# Patient Record
Sex: MALE | Race: WHITE | NOT HISPANIC OR LATINO | Employment: FULL TIME | URBAN - METROPOLITAN AREA
[De-identification: names, ages, dates, MRNs, and addresses within clinical notes are randomized per-mention and may not be internally consistent; named-entity substitution may affect disease eponyms.]

---

## 2017-01-05 ENCOUNTER — OFFICE VISIT (OUTPATIENT)
Dept: FAMILY MEDICINE | Facility: CLINIC | Age: 57
End: 2017-01-05
Payer: COMMERCIAL

## 2017-01-05 VITALS
HEIGHT: 71 IN | SYSTOLIC BLOOD PRESSURE: 118 MMHG | BODY MASS INDEX: 26.01 KG/M2 | TEMPERATURE: 96 F | DIASTOLIC BLOOD PRESSURE: 72 MMHG | HEART RATE: 81 BPM | WEIGHT: 185.8 LBS

## 2017-01-05 DIAGNOSIS — L30.9 ECZEMA OF BOTH HANDS: Primary | ICD-10-CM

## 2017-01-05 DIAGNOSIS — Z12.11 SPECIAL SCREENING FOR MALIGNANT NEOPLASMS, COLON: ICD-10-CM

## 2017-01-05 PROCEDURE — 99213 OFFICE O/P EST LOW 20 MIN: CPT | Performed by: FAMILY MEDICINE

## 2017-01-05 RX ORDER — BETAMETHASONE DIPROPIONATE 0.5 MG/G
OINTMENT, AUGMENTED TOPICAL
Qty: 45 G | Refills: 1 | Status: SHIPPED | OUTPATIENT
Start: 2017-01-05 | End: 2017-10-27

## 2017-01-05 NOTE — NURSING NOTE
"Chief Complaint   Patient presents with     Derm Problem     Pt here for dry itchy skin on hands.       Initial /72 mmHg  Pulse 81  Temp(Src) 96  F (35.6  C) (Tympanic)  Ht 5' 11\" (1.803 m)  Wt 185 lb 12.8 oz (84.278 kg)  BMI 25.93 kg/m2 Estimated body mass index is 25.93 kg/(m^2) as calculated from the following:    Height as of this encounter: 5' 11\" (1.803 m).    Weight as of this encounter: 185 lb 12.8 oz (84.278 kg).  BP completed using cuff size: antonella Samuel CMA    "

## 2017-01-05 NOTE — PATIENT INSTRUCTIONS
Stop triamcinolone ointment.  Start betamethasone ointment as prescribed.  Cerave cream emollient/moisturizer liberally to both hands and feet 3-4 x a day or more often.  AT night, may cover hands with non-latex glove and knitted glove to keep moisturized. May do this for a few days.    Skin care regimen reviewed with patient:   Eliminate harsh soaps, i.e. Dial, zest, [place] spring  Use mild soaps such as Cetaphil or Dove sensitive skin  Avoid hot or cold shower  [place] use of emollients including Vanicream, Cetaphil or cerave.    Nonspecific Dermatitis  Dermatitis is a skin rash caused by something that touches the skin and makes it irritated and inflamed.  Your skin may be red, swollen, dry, and may be cracked. Blisters may form and ooze. The rash will itch.  Dermatitis can form on the face and neck, backs of hands, forearms, genitals, and lower legs. Dermatitis is not passed from person to person.  Talk with your health care provider about what may have caused the rash. Common things that cause skin allergies are metal in jewelry, plants like poison ivy or poison oak, and certain skin care products. You will need to avoid the source of your rash in the future to prevent it from coming back. In some cases, the cause of the dermatitis may not be found.  Treatment is done to relieve itching and prevent the rash from coming back. The rash should go away in a few days to a few weeks.  Home care  The health care provider may prescribe medications to relieve swelling and itching. Follow all instructions when using these medications.    Avoid anything that heats up your skin, such as hot showers or baths, or direct sunlight. This can make itching worse.    Stay away from whatever you think caused the rash.    Bathe in warm, not hot, water. Apply a moisturizing lotion after bathing to prevent dry skin.    Avoid skin irritants such as wool or silk clothing, grease, oils, harsh soaps, and detergents.    Apply cold  compresses to soothe your sores to help relieve your symptoms. Do this for 30 minutes 3 to 4 times a day. You can make a cold compress by soaking a cloth in cold water. Squeeze out excess water. You can add colloidal oatmeal to the water to help reduce itching. For severe itching in a small area, apply an ice pack wrapped in a thin towel. Do this for 20 minutes 3 to 4 times a day.    You can also help relieve large areas of itching by taking a lukewarm bath with colloidal oatmeal added to the water.    Use hydrocortisone cream for redness and irritation, unless another medicine was prescribed. You can also use benzocaine anesthetic cream or spray.    Use oral diphenhydramine to help reduce itching. This is an antihistamine you can buy at drug and grocery stores. It can make you sleepy, so use lower doses during the daytime. Or you can use loratadine. This is an antihistamine that will not make you sleepy. Don t use diphenhydramine if you have glaucoma or have trouble urinating because of an enlarged prostate.    Wash your hands or use an antibacterial gel often to prevent the spread of the rash.  Follow-up care  Follow up with your health care provider. Make an appointment with your health care provider if your symptoms do not get better in the next 1 to 2 weeks.  When to seek medical advice  Call your health care provider right away if any of these occur:    Spreading of the rash to other parts of your body    Severe swelling of your face, eyelids, mouth, throat or tongue    Trouble urinating due to swelling in the genital area    Fever of 100.4 F (38 C) or higher    Redness or swelling that gets worse    Pain that gets worse    Foul-smelling fluid leaking from the skin    Yellow-brown crusts on the open blisters    Joint pain     2775-6882 [employer]. [address] [place] [address], [place] [address]. All rights reserved. This information is not intended as a substitute for professional medical care. Always  follow your healthcare professional's instructions.

## 2017-01-05 NOTE — PROGRESS NOTES
SUBJECTIVE:                                                    Kenney Mcgowan is a 56 year old male who presents to clinic today for the following health issues:  Chief Complaint   Patient presents with     Derm Problem     Pt here for dry itchy skin on hands.         Rash     Onset: 3-4 yrs     Description:   Location: hands and both legs   Character: flakey, red, raised  Itching (Pruritis): YES    Progression of Symptoms:  usually occurs when winter starts and constant throughout winter    Accompanying Signs & Symptoms:  Fever: no   Body aches or joint pain: no   Sore throat symptoms: no   Recent cold symptoms: no    History:   Previous similar rash: YES- dermatitis    Precipitating factors:   Exposure to similar rash: no   New exposures: None   Recent travel: no     Alleviating factors:  none     Therapies Tried and outcome: triamcinlone ointement helps but does not go away, coconut oil, lavendar, essential oils    Verified above history with patient.      Problem list and histories reviewed & adjusted, as indicated.  Additional history: as documented    Patient Active Problem List   Diagnosis     Enthesopathy     Nonallopathic lesion of lower extremities     Tobacco chew use     Intermittent asthma     CARDIOVASCULAR SCREENING; LDL GOAL LESS THAN 130     History reviewed. No pertinent past surgical history.    Social History   Substance Use Topics     Smoking status: Never Smoker      Smokeless tobacco: Current User     Types: Chew      Comment: daily      Alcohol Use: Yes      Comment: occassionally     Family History   Problem Relation Age of Onset     DIABETES Father      HEART DISEASE Father      DIABETES Sister      HEART DISEASE Mother          Current Outpatient Prescriptions   Medication Sig Dispense Refill     augmented betamethasone dipropionate (DIPROLENE-AF) 0.05 % ointment Apply sparingly to affected area twice daily as needed.  Do not apply to face. 45 g 1     Emollient (CERAVE) CREA Apply liberally  "to both hands 3-4 x a day. 453 g 3     budesonide-formoterol (SYMBICORT) 80-4.5 MCG/ACT inhaler 1 puff each night (Needs follow-up appointment for this medication) 1 Inhaler 0     albuterol (PROAIR HFA, PROVENTIL HFA, VENTOLIN HFA) 108 (90 BASE) MCG/ACT inhaler 2 puffs prior to exercise and as needed for shortness of breath 3 Inhaler 1     Allergies   Allergen Reactions     Cat Hair Extract        ROS:  C: NEGATIVE for fever, chills, change in weight  INTEGUMENTARY/SKIN: see above  MUSCULOSKELETAL: see above  H: NEGATIVE for bleeding problems   OBJECTIVE:                                                    /72 mmHg  Pulse 81  Temp(Src) 96  F (35.6  C) (Tympanic)  Ht 5' 11\" (1.803 m)  Wt 185 lb 12.8 oz (84.278 kg)  BMI 25.93 kg/m2  Body mass index is 25.93 kg/(m^2).  GEN: alert, oriented x 3, NAD  SKIN: good turgor; patchy dry areas of skin with some superficial excoriations from scratching on the dorsum of hands and extensor surfaces of the fingers bilaterally; patient declined examination of feet today.  Diagnostic test results:  Diagnostic Test Results:  none      ASSESSMENT/PLAN:                                                        ICD-10-CM    1. Eczema of both hands L30.9 augmented betamethasone dipropionate (DIPROLENE-AF) 0.05 % ointment     Emollient (CERAVE) CREA  Since no improvement with triamcinolone, stop the med.  Start betamethasone.  Discussed liberal use of cerave multiple times a day.  Daily skin care discussed; see AVS.  Return precautions discussed and given to patient.     2. Special screening for malignant neoplasms, colon Z12.11 GASTROENTEROLOGY ADULT REFERRAL +/- PROCEDURE               Follow up with Provider - prn   Patient Instructions   Stop triamcinolone ointment.  Start betamethasone ointment as prescribed.  Cerave cream emollient/moisturizer liberally to both hands and feet 3-4 x a day or more often.  AT night, may cover hands with non-latex glove and knitted glove to keep " moisturized. May do this for a few days.    Skin care regimen reviewed with patient:   Eliminate harsh soaps, i.e. Dial, zest, [place]hto spring  Use mild soaps such as Cetaphil or Dove sensitive skin  Avoid hot or cold shower  [place] use of emollients including Vanicream, Cetaphil or cerave.    Nonspecific Dermatitis  Dermatitis is a skin rash caused by something that touches the skin and makes it irritated and inflamed.  Your skin may be red, swollen, dry, and may be cracked. Blisters may form and ooze. The rash will itch.  Dermatitis can form on the face and neck, backs of hands, forearms, genitals, and lower legs. Dermatitis is not passed from person to person.  Talk with your health care provider about what may have caused the rash. Common things that cause skin allergies are metal in jewelry, plants like poison ivy or poison oak, and certain skin care products. You will need to avoid the source of your rash in the future to prevent it from coming back. In some cases, the cause of the dermatitis may not be found.  Treatment is done to relieve itching and prevent the rash from coming back. The rash should go away in a few days to a few weeks.  Home care  The health care provider may prescribe medications to relieve swelling and itching. Follow all instructions when using these medications.    Avoid anything that heats up your skin, such as hot showers or baths, or direct sunlight. This can make itching worse.    Stay away from whatever you think caused the rash.    Bathe in warm, not hot, water. Apply a moisturizing lotion after bathing to prevent dry skin.    Avoid skin irritants such as wool or silk clothing, grease, oils, harsh soaps, and detergents.    Apply cold compresses to soothe your sores to help relieve your symptoms. Do this for 30 minutes 3 to 4 times a day. You can make a cold compress by soaking a cloth in cold water. Squeeze out excess water. You can add colloidal oatmeal to the water to help reduce  itching. For severe itching in a small area, apply an ice pack wrapped in a thin towel. Do this for 20 minutes 3 to 4 times a day.    You can also help relieve large areas of itching by taking a lukewarm bath with colloidal oatmeal added to the water.    Use hydrocortisone cream for redness and irritation, unless another medicine was prescribed. You can also use benzocaine anesthetic cream or spray.    Use oral diphenhydramine to help reduce itching. This is an antihistamine you can buy at drug and grocery stores. It can make you sleepy, so use lower doses during the daytime. Or you can use loratadine. This is an antihistamine that will not make you sleepy. Don t use diphenhydramine if you have glaucoma or have trouble urinating because of an enlarged prostate.    Wash your hands or use an antibacterial gel often to prevent the spread of the rash.  Follow-up care  Follow up with your health care provider. Make an appointment with your health care provider if your symptoms do not get better in the next 1 to 2 weeks.  When to seek medical advice  Call your health care provider right away if any of these occur:    Spreading of the rash to other parts of your body    Severe swelling of your face, eyelids, mouth, throat or tongue    Trouble urinating due to swelling in the genital area    Fever of 100.4 F (38 C) or higher    Redness or swelling that gets worse    Pain that gets worse    Foul-smelling fluid leaking from the skin    Yellow-brown crusts on the open blisters    Joint pain     8283-9603 [employer]. [address] [place] [address], [place] [id number]. All rights reserved. This information is not intended as a substitute for professional medical care. Always follow your healthcare professional's instructions.              Malcolm Truong MD  [facility]

## 2017-01-05 NOTE — MR AVS SNAPSHOT
After Visit Summary   1/5/2017    Kenney Mcgowan    MRN: [id number]           Patient Information     Date Of Birth          1960        Visit Information        Provider Department      1/5/2017 1:40 PM Malcolm Truong MD [facility]        Today's Diagnoses     Eczema of both hands    -  1     Special screening for malignant neoplasms, colon         Intermittent asthma, uncomplicated           Care Instructions    Stop triamcinolone ointment.  Start betamethasone ointment as prescribed.  Cerave cream emollient/moisturizer liberally to both hands and feet 3-4 x a day or more often.  AT night, may cover hands with non-latex glove and knitted glove to keep moisturized. May do this for a few days.    Skin care regimen reviewed with patient:   Eliminate harsh soaps, i.e. Dial, zest, Vi spring  Use mild soaps such as Cetaphil or Dove sensitive skin  Avoid hot or cold shower  [place] use of emollients including Vanicream, Cetaphil or cerave.    Nonspecific Dermatitis  Dermatitis is a skin rash caused by something that touches the skin and makes it irritated and inflamed.  Your skin may be red, swollen, dry, and may be cracked. Blisters may form and ooze. The rash will itch.  Dermatitis can form on the face and neck, backs of hands, forearms, genitals, and lower legs. Dermatitis is not passed from person to person.  Talk with your health care provider about what may have caused the rash. Common things that cause skin allergies are metal in jewelry, plants like poison ivy or poison oak, and certain skin care products. You will need to avoid the source of your rash in the future to prevent it from coming back. In some cases, the cause of the dermatitis may not be found.  Treatment is done to relieve itching and prevent the rash from coming back. The rash should go away in a few days to a few weeks.  Home care  The health care provider may prescribe medications to relieve swelling and  itching. Follow all instructions when using these medications.    Avoid anything that heats up your skin, such as hot showers or baths, or direct sunlight. This can make itching worse.    Stay away from whatever you think caused the rash.    Bathe in warm, not hot, water. Apply a moisturizing lotion after bathing to prevent dry skin.    Avoid skin irritants such as wool or silk clothing, grease, oils, harsh soaps, and detergents.    Apply cold compresses to soothe your sores to help relieve your symptoms. Do this for 30 minutes 3 to 4 times a day. You can make a cold compress by soaking a cloth in cold water. Squeeze out excess water. You can add colloidal oatmeal to the water to help reduce itching. For severe itching in a small area, apply an ice pack wrapped in a thin towel. Do this for 20 minutes 3 to 4 times a day.    You can also help relieve large areas of itching by taking a lukewarm bath with colloidal oatmeal added to the water.    Use hydrocortisone cream for redness and irritation, unless another medicine was prescribed. You can also use benzocaine anesthetic cream or spray.    Use oral diphenhydramine to help reduce itching. This is an antihistamine you can buy at drug and grocery stores. It can make you sleepy, so use lower doses during the daytime. Or you can use loratadine. This is an antihistamine that will not make you sleepy. Don t use diphenhydramine if you have glaucoma or have trouble urinating because of an enlarged prostate.    Wash your hands or use an antibacterial gel often to prevent the spread of the rash.  Follow-up care  Follow up with your health care provider. Make an appointment with your health care provider if your symptoms do not get better in the next 1 to 2 weeks.  When to seek medical advice  Call your health care provider right away if any of these occur:    Spreading of the rash to other parts of your body    Severe swelling of your face, eyelids, mouth, throat or  tongue    Trouble urinating due to swelling in the genital area    Fever of 100.4 F (38 C) or higher    Redness or swelling that gets worse    Pain that gets worse    Foul-smelling fluid leaking from the skin    Yellow-brown crusts on the open blisters    Joint pain     9591-0003 [employer]. [address] [place] [address]. All rights reserved. This information is not intended as a substitute for professional medical care. Always follow your healthcare professional's instructions.              Follow-ups after your visit        Additional Services     GASTROENTEROLOGY ADULT REFERRAL +/- PROCEDURE       Your provider has referred you to Gastroenterology Services.    English    Procedure/Referral: PROCEDURE ONLY - COLONOSCOPY - Reason for procedure: Screening  FMG: [facility] [phone]   [email or web address]  Indications for Colonoscopy - Screening    Current use of Coumadin, NSAID's, ASA, (clinical indication must be considered before stopping): None    Any Health Problems pertinent to Colonoscopy: None    Preparation Instructions: MagCitrate bowel preparation instructions given      Antibiotic Prophylaxis is not recommended for Colonoscopies, even with biopsies.        Date of Test:   TOA:       Please call [phone] to schedule this procedure in [place].     Stephanie Samuel   1/5/2017    Please be aware that coverage of these services is subject to the terms and limitations of your health insurance plan.  Call member services at your health plan with any benefit or coverage questions.  Any procedures must be performed at a [place] facility OR coordinated by your clinic's referral office.    Please bring the following with you to your appointment:    (1) Any X-Rays, CTs or MRIs which have been performed.  Contact the facility where they were done to arrange for  prior to your scheduled appointment.    (2) List of current  "medications   (3) This referral request   (4) Any documents/labs given to you for this referral                  Who to contact     If you have questions or need follow up information about today's clinic visit or your schedule please contact [facility] directly at [phone].  Normal or non-critical lab and imaging results will be communicated to you by MyChart, letter or phone within 4 business days after the clinic has received the results. If you do not hear from us within 7 days, please contact the clinic through MyChart or phone. If you have a critical or abnormal lab result, we will notify you by phone as soon as possible.  Submit refill requests through [employer] or call your pharmacy and they will forward the refill request to us. Please allow 3 business days for your refill to be completed.          Additional Information About Your Visit        MyChart Information     [employer] lets you send messages to your doctor, view your test results, renew your prescriptions, schedule appointments and more. To sign up, go to [email or web address] . Click on \"Log in\" on the left side of the screen, which will take you to the Welcome page. Then click on \"Sign up Now\" on the right side of the page.     You will be asked to enter the access code listed below, as well as some personal information. Please follow the directions to create your username and password.     Your access code is: [email or web address]  Expires: 2017  2:15 PM     Your access code will  in 90 days. If you need help or a new code, please call your [facility] or [phone].        Care EveryWhere ID     This is your Care EveryWhere ID. This could be used by other organizations to access your [place] medical records  [id number]        Your Vitals Were     Pulse Temperature Height BMI (Body Mass Index)          81 96  F (35.6  C) (Tympanic) 5' 11\" (1.803 m) 25.93 kg/m2         Blood Pressure from Last 3 Encounters:   17 " 118/72   02/12/16 138/74   06/30/15 119/68    Weight from Last 3 Encounters:   01/05/17 185 lb 12.8 oz (84.278 kg)   02/12/16 180 lb (81.647 kg)   06/29/15 174 lb 12.8 oz (79.289 kg)              We Performed the Following     Asthma Action Plan (AAP)     GASTROENTEROLOGY ADULT REFERRAL +/- PROCEDURE          Today's Medication Changes          These changes are accurate as of: 1/5/17  2:15 PM.  If you have any questions, ask your nurse or doctor.               Start taking these medicines.        Dose/Directions    augmented betamethasone dipropionate 0.05 % ointment   Commonly known as:  DIPROLENE-AF   Used for:  Eczema of both hands   Started by:  Malcolm Truong MD        Apply sparingly to affected area twice daily as needed.  Do not apply to face.   Quantity:  45 g   Refills:  1       CERAVE Crea   Used for:  Eczema of both hands   Started by:  Malcolm Truong MD        Apply liberally to both hands 3-4 x a day.   Quantity:  453 g   Refills:  3         Stop taking these medicines if you haven't already. Please contact your care team if you have questions.     triamcinolone 0.1 % cream   Commonly known as:  KENALOG   Stopped by:  Malcolm Truong MD                Where to get your medicines      These [place] [facility] [place] [facility] [place] [address] [address]     Phone:  [phone]    - augmented betamethasone dipropionate 0.05 % ointment  - CERAVE Crea             Primary Care Provider Office Phone # Fax #    Rakan Laureano [phone]       [facility] [address] [facility] [address]        Thank you!     Thank you for choosing [facility]  for your care. Our goal is always to provide you with excellent care. Hearing back from our patients is one way we can continue to improve our services. Please take a few minutes to complete the written survey that  you may receive in the mail after your visit with us. Thank you!             Your Updated Medication List - Protect others around you: Learn how to safely use, store and throw away your medicines at www.disposemymeds.org.          This list is accurate as of: 1/5/17  2:15 PM.  Always use your most recent med list.                   Brand Name Dispense Instructions for use    albuterol 108 (90 BASE) MCG/ACT Inhaler    PROAIR HFA/PROVENTIL HFA/VENTOLIN HFA    3 Inhaler    2 puffs prior to exercise and as needed for shortness of breath       augmented betamethasone dipropionate 0.05 % ointment    DIPROLENE-AF    45 g    Apply sparingly to affected area twice daily as needed.  Do not apply to face.       budesonide-formoterol 80-4.5 MCG/ACT Inhaler    SYMBICORT    1 Inhaler    1 puff each night (Needs follow-up appointment for this medication)       CERAVE Crea     453 g    Apply liberally to both hands 3-4 x a day.

## 2017-01-06 ASSESSMENT — ASTHMA QUESTIONNAIRES: ACT_TOTALSCORE: 20

## 2017-08-28 ENCOUNTER — TELEPHONE (OUTPATIENT)
Dept: FAMILY MEDICINE | Facility: CLINIC | Age: 57
End: 2017-08-28

## 2017-08-28 NOTE — TELEPHONE ENCOUNTER
Panel Management Review      Patient has the following on his problem list:     Asthma review     ACT Total Scores 1/5/2017   ACT TOTAL SCORE -   ASTHMA ER VISITS -   ASTHMA HOSPITALIZATIONS -   ACT TOTAL SCORE (Goal Greater than or Equal to 20) 20   In the past 12 months, how many times did you visit the emergency room for your asthma without being admitted to the hospital? 0   In the past 12 months, how many times were you hospitalized overnight because of your asthma? 0      1. Is Asthma diagnosis on the Problem List? Yes    2. Is Asthma listed on Health Maintenance? Yes    3. Patient is due for:  ACT        Composite cancer screening  Chart review shows that this patient is due/due soon for the following Colonoscopy and Fecal Colorectal (FIT)  Summary:    Patient is due/failing the following:   ACT, COLONOSCOPY and FIT    Action needed:   Patient needs to do ACT. and Patient needs referral/order: colonoscopy/FIT test    Type of outreach:    Sent letter.    Questions for provider review:    None                                                                                                                                    Melania Aldana CMA..........8/28/2017 3:43 PM

## 2017-08-28 NOTE — LETTER
[facility]  [address]  [place] [facility] [place] [address]  Phone: [phone]    August 28, 2017        Kenney Mcgowan  [id number]  [place] [address]          Dear Kenney,    We care about your health and have reviewed your health plan and are making recommendations based on this review, to optimize your health.    You are in particular need of attention regarding:    -Asthma Control Test  -Colon Cancer Screening    We are recommending that you:                                                                                                                                       -fill out and return enclosed Asthma Control Test questionnaire          A self addressed, stamped envelope has been provided to return the completed questionnaire.                                                                                                                      -schedule a COLONOSCOPY to look for colon cancer (due every 10 years or 5 years in higher risk situations.)        Colon cancer is now the second leading cause of cancer-related deaths in the [place] for both men and women and there are over 130,000 new cases and 50,000 deaths per year from colon cancer.  Colonoscopies can prevent 90-95% of these deaths.  Problem lesions can be removed before they ever become cancer.  This test is not only looking for cancer, but also getting rid of precancerious lesions.      If you are under/uninsured, we recommend you contact the [employer] program. [employer] is a free colorectal cancer screening program that provides colonoscopies for eligible under/uninsured [place] men and women. If you are interested in receiving a free colonoscopy, please call [employer] at [phone] (mention code [employer]) to see if you re eligible.     If you do not wish to do a colonoscopy or cannot afford to do one, at this time, there is another option. It is called a FIT test or Fecal Immunochemical Occult Blood  Test (take home stool sample kit).  It does not replace the colonoscopy for colorectal cancer screening, but it can detect hidden bleeding in the lower colon.  It does need to be repeated every year and if a positive result is obtained, you would be referred for a colonoscopy.      If you have completed either one of these tests or had a flexible sigmoidoscopy in the past five years at another facility, please have the records sent to our clinic so that we can best coordinate your care and update your chart.  Please call us if you have questions or would like arrange either to do a colonoscopy or obtain the necessary test kit for the Fecal Occult Test.      Sincerely,        Malcolm Truong MD / amanda cma

## 2017-10-27 ENCOUNTER — OFFICE VISIT (OUTPATIENT)
Dept: LAB | Facility: SCHOOL | Age: 57
End: 2017-10-27
Payer: COMMERCIAL

## 2017-10-27 VITALS
HEART RATE: 79 BPM | HEIGHT: 71 IN | TEMPERATURE: 98.2 F | OXYGEN SATURATION: 94 % | DIASTOLIC BLOOD PRESSURE: 78 MMHG | SYSTOLIC BLOOD PRESSURE: 138 MMHG | BODY MASS INDEX: 25.9 KG/M2 | WEIGHT: 185 LBS

## 2017-10-27 DIAGNOSIS — L25.9 CONTACT DERMATITIS, UNSPECIFIED CONTACT DERMATITIS TYPE, UNSPECIFIED TRIGGER: ICD-10-CM

## 2017-10-27 DIAGNOSIS — L30.9 ECZEMA, UNSPECIFIED TYPE: Primary | ICD-10-CM

## 2017-10-27 PROCEDURE — 99213 OFFICE O/P EST LOW 20 MIN: CPT | Performed by: NURSE PRACTITIONER

## 2017-10-27 NOTE — PROGRESS NOTES
SUBJECTIVE:   Kenney Mcgowan is a 57 year old male who presents to clinic today for the following health issues:    Derm Problem   - he is hoping for a referral if needed.       Duration: ongoing for    Description (location/character/radiation): Pt is concerned about a rash on his hands, or dry sin on his hands.     Intensity:  moderate    Accompanying signs and symptoms: dry hands, scaly, blistery.     History (similar episodes/previous evaluation): He has been seen for this in the past.    Precipitating or alleviating factors: None    Therapies tried and outcome: Clobetasol Ointment, Prednisone tablets.      Has been using Clobetasol ointment - which helps but rash not fully resolved.  Using almost daily.  Also on Prednisone last weekend - almost done with the course of this.    Otherwise healthy no other health concerns.    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  Occasionally will have to wear gloves.  Uses non rinse soap/alcohol foam during the day.    Problem list and histories reviewed & adjusted, as indicated.  Additional history: as documented    Patient Active Problem List   Diagnosis     Enthesopathy     Nonallopathic lesion of lower extremities     Tobacco chew use     Intermittent asthma     CARDIOVASCULAR SCREENING; LDL GOAL LESS THAN 130     No past surgical history on file.    Social History   Substance Use Topics     Smoking status: Never Smoker     Smokeless tobacco: Current User     Types: Chew      Comment: daily      Alcohol use Yes      Comment: occassionally     Family History   Problem Relation Age of Onset     DIABETES Father      HEART DISEASE Father      DIABETES Sister      HEART DISEASE Mother          No current outpatient prescriptions on file.     Allergies   Allergen Reactions     Cat Hair Extract      Recent Labs   Lab Test  06/30/15   1251  06/29/15   1009  07/06/12   0811   LDL   --    --   138*   HDL   --    --   77   TRIG   --    --   94   ALT  3495*   --     "--    CR   --   1.10   --    GFRESTIMATED   --   70   --    GFRESTBLACK   --   85   --    POTASSIUM   --   3.7   --       BP Readings from Last 3 Encounters:   10/27/17 138/78   01/05/17 118/72   02/12/16 138/74    Wt Readings from Last 3 Encounters:   10/27/17 185 lb (83.9 kg)   01/05/17 185 lb 12.8 oz (84.3 kg)   02/12/16 180 lb (81.6 kg)                  Labs reviewed in EPIC          Reviewed and updated as needed this visit by clinical staff       Reviewed and updated as needed this visit by Provider         ROS:  Constitutional, HEENT, cardiovascular, pulmonary, GI, , musculoskeletal, neuro, skin, endocrine and psych systems are negative, except as otherwise noted.      OBJECTIVE:   /78  Pulse 79  Temp 98.2  F (36.8  C) (Tympanic)  Ht 5' 11\" (1.803 m)  Wt 185 lb (83.9 kg)  SpO2 94%  BMI 25.8 kg/m2  Body mass index is 25.8 kg/(m^2).  GENERAL: healthy, alert and no distress  SKIN: scaly, mildly erythematous peeling rash on palms and in between digits bilateral hands.    Diagnostic Test Results:  none     ASSESSMENT/PLAN:     1. Eczema, unspecified type     - DERMATOLOGY REFERRAL    2. Contact dermatitis, unspecified contact dermatitis type, unspecified trigger     Discussed good skin care.  Continue Clobetasol per prescription previously given.    Referral placed today.    Advised follow up with PCP for ongoing problems or for refills on topical agents.    - DERMATOLOGY REFERRAL    Patient Instructions   Tips for good skin care - use of hypoallergenic moisturizer at least twice daily, and one of those should be immediatly after bathing.   Choices include:  Vanicream (can be greasy but the least expensive), Cetaphil cream (less greasy), Eucerin (thick, but greasy), Avenno,  petroleum jelly, or mineral oil.    Topical steroids if given - should always be applied after the use of moisturizer and should be used for limited times for only as long as needed.  Steroids should never replace the use of " moisturizer.      Fragrance free soaps/detergents can be helpful.  Also may avoid using soap on some dry surfaces especially in the winter.  Humidifiers and good oral hydration are very important.    BELEN Kirkland          Atopic Dermatitis (Adult)  Atopic dermatitis is a dry, itchy, red rash. It s also called eczema. The rash is chronic, or ongoing. It can come and go over time. The disease is often passed down in families. It causes a problem with the skin barrier that makes the skin more sensitive to the environment and other factors. The increased skin sensitivity causes an itch, which causes scratching. Scratching can worsen the itching or also break the skin. This can put the skin at risk of infection.  The condition is most common in people with asthma, hay fever, hives, or dry or sensitive skin. The rash may be caused by extreme heat or heavy sweating. Skin irritants can cause the rash to flare up. These can include wool or silk clothing, grease, oils, some medicines, and harsh soaps and detergents. Emotional stress can also be a trigger.  Treatment is done to relieve the itching and inflammation of the skin.  Home care  Follow these tips to care for your condition:    Keep the areas of rash clean by bathing at least every other day. Use lukewarm water to bathe. Don t use hot water, which can dry out the skin.    Don t use soaps with strong detergents. Use mild soaps made for sensitive skin.    Apply a cream or ointment to damp skin right after bathing.    Avoid things that irritate your skin. Wear absorbent, soft fabrics next to the skin rather than rough or scratchy materials.    Use mild laundry soap free of scents and perfumes. Make sure to rinse all the soap out of your clothes.    Treat any skin infection as directed.    Use oral diphenhydramine to help reduce itching. This is an antihistamine you can buy at drug and grocery stores. It can make you sleepy, so use lower doses during the daytime.  Or you can use loratadine. This is an antihistamine that will not make you sleepy. Do not use diphenhydramine if you have glaucoma or have trouble urinating due to an enlarged prostate.  Follow-up care  See your healthcare provider, or as advised. If your symptoms don t get better or if they get worse in the next 7 days, make an appointment with your healthcare provider.  When to seek medical advice  Call your healthcare provider right away  if any of these occur:    Increasing area of redness or pain in the skin    Yellow crusts or wet drainage from the rash    Fever of 100.4 F (38 C) or higher, or as directed by your healthcare provider  Date Last Reviewed: 9/1/2016 2000-2017 [employer]. [address] [place] [address], [place] [address]. All rights reserved. This information is not intended as a substitute for professional medical care. Always follow your healthcare professional's instructions.            Violeta Howe NP  [facility]

## 2017-10-27 NOTE — NURSING NOTE
"Initial /78  Pulse 79  Temp 98.2  F (36.8  C) (Tympanic)  Ht 5' 11\" (1.803 m)  Wt 185 lb (83.9 kg)  SpO2 94%  BMI 25.8 kg/m2 Estimated body mass index is 25.8 kg/(m^2) as calculated from the following:    Height as of this encounter: 5' 11\" (1.803 m).    Weight as of this encounter: 185 lb (83.9 kg). .    Roxie Ramires CMA ([facility])  "

## 2017-10-27 NOTE — MR AVS SNAPSHOT
After Visit Summary   10/27/2017    Kenney Mcgowan    MRN: [id number]           Patient Information     Date Of Birth          1960        Visit Information        Provider Department      10/27/2017 2:30 PM Violeta Howe NP [facility]         Today's Diagnoses     Eczema, unspecified type    -  1    Contact dermatitis, unspecified contact dermatitis type, unspecified trigger          Care Instructions    Tips for good skin care - use of hypoallergenic moisturizer at least twice daily, and one of those should be immediatly after bathing.   Choices include:  Vanicream (can be greasy but the least expensive), Cetaphil cream (less greasy), Eucerin (thick, but greasy), Avenno,  petroleum jelly, or mineral oil.    Topical steroids if given - should always be applied after the use of moisturizer and should be used for limited times for only as long as needed.  Steroids should never replace the use of moisturizer.      Fragrance free soaps/detergents can be helpful.  Also may avoid using soap on some dry surfaces especially in the winter.  Humidifiers and good oral hydration are very important.    BELEN Kirkland          Atopic Dermatitis (Adult)  Atopic dermatitis is a dry, itchy, red rash. It s also called eczema. The rash is chronic, or ongoing. It can come and go over time. The disease is often passed down in families. It causes a problem with the skin barrier that makes the skin more sensitive to the environment and other factors. The increased skin sensitivity causes an itch, which causes scratching. Scratching can worsen the itching or also break the skin. This can put the skin at risk of infection.  The condition is most common in people with asthma, hay fever, hives, or dry or sensitive skin. The rash may be caused by extreme heat or heavy sweating. Skin irritants can cause the rash to flare up. These can include wool or silk clothing, grease, oils, some  medicines, and harsh soaps and detergents. Emotional stress can also be a trigger.  Treatment is done to relieve the itching and inflammation of the skin.  Home care  Follow these tips to care for your condition:    Keep the areas of rash clean by bathing at least every other day. Use lukewarm water to bathe. Don t use hot water, which can dry out the skin.    Don t use soaps with strong detergents. Use mild soaps made for sensitive skin.    Apply a cream or ointment to damp skin right after bathing.    Avoid things that irritate your skin. Wear absorbent, soft fabrics next to the skin rather than rough or scratchy materials.    Use mild laundry soap free of scents and perfumes. Make sure to rinse all the soap out of your clothes.    Treat any skin infection as directed.    Use oral diphenhydramine to help reduce itching. This is an antihistamine you can buy at drug and grocery stores. It can make you sleepy, so use lower doses during the daytime. Or you can use loratadine. This is an antihistamine that will not make you sleepy. Do not use diphenhydramine if you have glaucoma or have trouble urinating due to an enlarged prostate.  Follow-up care  See your healthcare provider, or as advised. If your symptoms don t get better or if they get worse in the next 7 days, make an appointment with your healthcare provider.  When to seek medical advice  Call your healthcare provider right away  if any of these occur:    Increasing area of redness or pain in the skin    Yellow crusts or wet drainage from the rash    Fever of 100.4 F (38 C) or higher, or as directed by your healthcare provider  Date Last Reviewed: 9/1/2016 2000-2017 [employer]. [address] [place] [address], [place] [address]. All rights reserved. This information is not intended as a substitute for professional medical care. Always follow your healthcare professional's instructions.                Follow-ups after your visit        Additional  "Services     DERMATOLOGY REFERRAL       Your provider has referred you to: [facility]: [facility] [phone]   [email or web address]    Please be aware that coverage of these services is subject to the terms and limitations of your health insurance plan.  Call member services at your health plan with any benefit or coverage questions.      Please bring the following with you to your appointment:    (1) Any X-Rays, CTs or MRIs which have been performed.  Contact the facility where they were done to arrange for  prior to your scheduled appointment.    (2) List of current medications  (3) This referral request   (4) Any documents/labs given to you for this referral                  Who to contact     If you have questions or need follow up information about today's clinic visit or your schedule please contact [facility]  directly at [phone].  Normal or non-critical lab and imaging results will be communicated to you by MyChart, letter or phone within 4 business days after the clinic has received the results. If you do not hear from us within 7 days, please contact the clinic through MyChart or phone. If you have a critical or abnormal lab result, we will notify you by phone as soon as possible.  Submit refill requests through [employer] or call your pharmacy and they will forward the refill request to us. Please allow 3 business days for your refill to be completed.          Additional Information About Your Visit        [employer] Information     [employer] lets you send messages to your doctor, view your test results, renew your prescriptions, schedule appointments and more. To sign up, go to [email or web address] . Click on \"Log in\" on the left side of the screen, which will take you to the Welcome page. Then click on \"Sign up Now\" on the right side of the page.     You will be asked to enter the access code listed below, as well as some " "personal information. Please follow the directions to create your username and password.     Your access code is: [email or web address]  Expires: 2018  2:58 PM     Your access code will  in 90 days. If you need help or a new code, please call your [place] clinic or [phone].        Care EveryWhere ID     This is your Care EveryWhere ID. This could be used by other organizations to access your [place] medical records  [id number]        Your Vitals Were     Pulse Temperature Height Pulse Oximetry BMI (Body Mass Index)       79 98.2  F (36.8  C) (Tympanic) 5' 11\" (1.803 m) 94% 25.8 kg/m2        Blood Pressure from Last 3 Encounters:   10/27/17 138/78   17 118/72   16 138/74    Weight from Last 3 Encounters:   10/27/17 185 lb (83.9 kg)   17 185 lb 12.8 oz (84.3 kg)   16 180 lb (81.6 kg)              We Performed the Following     DERMATOLOGY REFERRAL        Primary Care Provider Office Phone # Fax #    Rakan Laureano [phone] [address] [address]        Equal Access to Services     LOREN DE LA GARZA : Hadii ziggy dillard hadasho Soarunali, waaxda luqadaha, qaybta kaalmada adeegyada, albert hernandez . So [facility] [phone].    ATENCIÓN: Si habla español, tiene a moran disposición servicios gratuitos de asistencia lingüística. [facility] [phone].    We comply with applicable federal civil rights laws and [place] laws. We do not discriminate on the basis of race, color, national origin, age, disability, sex, sexual orientation, or gender identity.            Thank you!     Thank you for choosing [place] [address]  for your care. Our goal is always to provide you with excellent care. Hearing back from our patients is one way we can continue to improve our services. Please take a few minutes to complete the written survey that you may receive in the mail after your visit with us. Thank you!             Your " Updated Medication List - Protect others around you: Learn how to safely use, store and throw away your medicines at www.disposemymeds.org.      Notice  As of 10/27/2017  2:58 PM    You have not been prescribed any medications.

## 2017-10-27 NOTE — PATIENT INSTRUCTIONS
Tips for good skin care - use of hypoallergenic moisturizer at least twice daily, and one of those should be immediatly after bathing.   Choices include:  Vanicream (can be greasy but the least expensive), Cetaphil cream (less greasy), Eucerin (thick, but greasy), Avenno,  petroleum jelly, or mineral oil.    Topical steroids if given - should always be applied after the use of moisturizer and should be used for limited times for only as long as needed.  Steroids should never replace the use of moisturizer.      Fragrance free soaps/detergents can be helpful.  Also may avoid using soap on some dry surfaces especially in the winter.  Humidifiers and good oral hydration are very important.    Violeta Howe, BELEN          Atopic Dermatitis (Adult)  Atopic dermatitis is a dry, itchy, red rash. It s also called eczema. The rash is chronic, or ongoing. It can come and go over time. The disease is often passed down in families. It causes a problem with the skin barrier that makes the skin more sensitive to the environment and other factors. The increased skin sensitivity causes an itch, which causes scratching. Scratching can worsen the itching or also break the skin. This can put the skin at risk of infection.  The condition is most common in people with asthma, hay fever, hives, or dry or sensitive skin. The rash may be caused by extreme heat or heavy sweating. Skin irritants can cause the rash to flare up. These can include wool or silk clothing, grease, oils, some medicines, and harsh soaps and detergents. Emotional stress can also be a trigger.  Treatment is done to relieve the itching and inflammation of the skin.  Home care  Follow these tips to care for your condition:    Keep the areas of rash clean by bathing at least every other day. Use lukewarm water to bathe. Don t use hot water, which can dry out the skin.    Don t use soaps with strong detergents. Use mild soaps made for sensitive skin.    Apply a cream or  ointment to damp skin right after bathing.    Avoid things that irritate your skin. Wear absorbent, soft fabrics next to the skin rather than rough or scratchy materials.    Use mild laundry soap free of scents and perfumes. Make sure to rinse all the soap out of your clothes.    Treat any skin infection as directed.    Use oral diphenhydramine to help reduce itching. This is an antihistamine you can buy at drug and grocery stores. It can make you sleepy, so use lower doses during the daytime. Or you can use loratadine. This is an antihistamine that will not make you sleepy. Do not use diphenhydramine if you have glaucoma or have trouble urinating due to an enlarged prostate.  Follow-up care  See your healthcare provider, or as advised. If your symptoms don t get better or if they get worse in the next 7 days, make an appointment with your healthcare provider.  When to seek medical advice  Call your healthcare provider right away  if any of these occur:    Increasing area of redness or pain in the skin    Yellow crusts or wet drainage from the rash    Fever of 100.4 F (38 C) or higher, or as directed by your healthcare provider  Date Last Reviewed: 9/1/2016 2000-2017 [employer]. [address] [place] [address], [place] [address]. All rights reserved. This information is not intended as a substitute for professional medical care. Always follow your healthcare professional's instructions.

## 2017-11-17 ENCOUNTER — NURSE TRIAGE (OUTPATIENT)
Dept: NURSING | Facility: CLINIC | Age: 57
End: 2017-11-17

## 2017-11-17 NOTE — TELEPHONE ENCOUNTER
"  Reason for Call: Patient requests a prescription refill for Prednisone. Patient advised Prednisone was not listed in his chart as one of his medications. Patient states he was advised by [facility] a Dr. Sanchez prescribed the Prednisone. \"I'll have [facility] call it in.\"     Patient Recommendations/Teaching:Please call back if you have any further concerns.     Routed to:Not routed.     Cassandra Aleman RN [facility]           "

## 2017-11-20 ENCOUNTER — TELEPHONE (OUTPATIENT)
Dept: FAMILY MEDICINE | Facility: CLINIC | Age: 57
End: 2017-11-20

## 2017-11-21 ENCOUNTER — OFFICE VISIT (OUTPATIENT)
Dept: DERMATOLOGY | Facility: CLINIC | Age: 57
End: 2017-11-21
Payer: COMMERCIAL

## 2017-11-21 VITALS — TEMPERATURE: 97.5 F | HEART RATE: 78 BPM | DIASTOLIC BLOOD PRESSURE: 96 MMHG | SYSTOLIC BLOOD PRESSURE: 166 MMHG

## 2017-11-21 DIAGNOSIS — L30.1 DYSHIDROTIC ECZEMA: Primary | ICD-10-CM

## 2017-11-21 PROCEDURE — 99203 OFFICE O/P NEW LOW 30 MIN: CPT | Performed by: PHYSICIAN ASSISTANT

## 2017-11-21 RX ORDER — PREDNISONE 10 MG/1
TABLET ORAL
Qty: 70 TABLET | Refills: 0 | Status: SHIPPED | OUTPATIENT
Start: 2017-11-21 | End: 2017-12-27

## 2017-11-21 RX ORDER — BUDESONIDE AND FORMOTEROL FUMARATE DIHYDRATE 80; 4.5 UG/1; UG/1
2 AEROSOL RESPIRATORY (INHALATION) 2 TIMES DAILY
COMMUNITY
End: 2018-07-06

## 2017-11-21 RX ORDER — CLOBETASOL PROPIONATE 0.5 MG/G
CREAM TOPICAL
Qty: 60 G | Refills: 3 | Status: SHIPPED | OUTPATIENT
Start: 2017-11-21 | End: 2018-07-06

## 2017-11-21 RX ORDER — ALBUTEROL SULFATE 90 UG/1
2 AEROSOL, METERED RESPIRATORY (INHALATION)
COMMUNITY
Start: 2012-07-09 | End: 2018-07-06

## 2017-11-21 NOTE — MR AVS SNAPSHOT
"              After Visit Summary   11/21/2017    Kenney Mcgowan    MRN: [id number]           Patient Information     Date Of Birth          1960        Visit Information        Provider Department      11/21/2017 10:20 AM Shira Noe PA-C [facility]        Today's Diagnoses     Dyshidrotic eczema    -  1       Follow-ups after your visit        Your next 10 appointments already scheduled     Dec 12, 2017  2:30 PM CST   New Visit with Christian Espino MD   [facility] ([facility]    [address] [address] [phone]              Who to contact     If you have questions or need follow up information about today's clinic visit or your schedule please contact [facility] directly at [phone].  Normal or non-critical lab and imaging results will be communicated to you by MyChart, letter or phone within 4 business days after the clinic has received the results. If you do not hear from us within 7 days, please contact the clinic through MyChart or phone. If you have a critical or abnormal lab result, we will notify you by phone as soon as possible.  Submit refill requests through ezCater or call your pharmacy and they will forward the refill request to us. Please allow 3 business days for your refill to be completed.          Additional Information About Your Visit        MyChart Information     ezCater lets you send messages to your doctor, view your test results, renew your prescriptions, schedule appointments and more. To sign up, go to [email or web address] . Click on \"Log in\" on the left side of the screen, which will take you to the Welcome page. Then click on \"Sign up Now\" on the right side of the page.     You will be asked to enter the access code listed below, as well as some personal information. Please follow the directions to create your username and password.     Your access code is: " [email or web address]  Expires: 2018  1:58 PM     Your access code will  in 90 days. If you need help or a new code, please call your [facility] clinic or [phone].        Care EveryWhere ID     This is your Care EveryWhere ID. This could be used by other organizations to access your [facility] medical records  [id number]        Your Vitals Were     Pulse Temperature                78 97.5  F (36.4  C)           Blood Pressure from Last 3 Encounters:   17 (!) 166/96   10/27/17 138/78   17 118/72    Weight from Last 3 Encounters:   10/27/17 83.9 kg (185 lb)   17 84.3 kg (185 lb 12.8 oz)   16 81.6 kg (180 lb)              Today, you had the following     No orders found for display         Today's Medication Changes          These changes are accurate as of: 17 11:59 PM.  If you have any questions, ask your nurse or doctor.               Start taking these medicines.        Dose/Directions    clobetasol 0.05 % cream   Commonly known as:  TEMOVATE   Used for:  Dyshidrotic eczema   Started by:  Shira Noe PA-C        Apply sparingly to affected area on hands twice daily as needed.  Do not apply to face.   Quantity:  60 g   Refills:  3       predniSONE 10 MG tablet   Commonly known as:  DELTASONE   Used for:  Dyshidrotic eczema   Started by:  Shira Noe PA-C        Take 4 pills daily for 7 days 3 tabs daily for 7 days, 2 tabs daily for 7 days, 1 tab daily x 7 days.   Quantity:  70 tablet   Refills:  0            Where to get your medicines      These medications were sent to [facility] [id number] [place] [address]     Phone:  [phone]     clobetasol 0.05 % cream    predniSONE 10 MG tablet                Primary Care Provider Office Phone # Fax #    Rakan Laureano [phone] [address] [facility] [address]        Equal Access to Services     LOREN SAAB: Heron  ziggy Marroquin, wamihirda luqadaha, qaybta kaalmada marky, albert idiin haylisachinmay shindenton mjbaileyandrea hernandez carlo. So [facility] [phone].    ATENCIÓN: Si habla cassieañol, tiene a moran disposición servicios gratuitos de asistencia lingüística. Jimmie al [phone].    We comply with applicable federal civil rights laws and [place] laws. We do not discriminate on the basis of race, color, national origin, age, disability, sex, sexual orientation, or gender identity.            Thank you!     Thank you for choosing [facility]  for your care. Our goal is always to provide you with excellent care. Hearing back from our patients is one way we can continue to improve our services. Please take a few minutes to complete the written survey that you may receive in the mail after your visit with us. Thank you!             Your Updated Medication List - Protect others around you: Learn how to safely use, store and throw away your medicines at www.disposemymeds.org.          This list is accurate as of: 11/21/17 11:59 PM.  Always use your most recent med list.                   Brand Name Dispense Instructions for use Diagnosis    albuterol 108 (90 BASE) MCG/ACT Inhaler    PROAIR HFA/PROVENTIL HFA/VENTOLIN HFA     Inhale 2 puffs into the lungs        budesonide-formoterol 80-4.5 MCG/ACT Inhaler    SYMBICORT     Inhale 2 puffs into the lungs 2 times daily        clobetasol 0.05 % cream    TEMOVATE    60 g    Apply sparingly to affected area on hands twice daily as needed.  Do not apply to face.    Dyshidrotic eczema       predniSONE 10 MG tablet    DELTASONE    70 tablet    Take 4 pills daily for 7 days 3 tabs daily for 7 days, 2 tabs daily for 7 days, 1 tab daily x 7 days.    Dyshidrotic eczema

## 2017-11-21 NOTE — LETTER
11/21/2017         RE: Kenney Mcgowan  [address] [address]        Dear Colleague,    Thank you for referring your patient, Kenney Mcgowan, to the [facility]. Please see a copy of my visit note below.    Kenney Mcgowan is a 57 year old year old male patient here today for consult on hand eczema by Violeta Howe CNP.  Patient reports that he has had issues with eczema for the past 4-5 years, worse this past year. He reports that the is a  and washes his hands often. He doesn't always moisturizer. He reports typically his eczema is well controlled with clobetasol. He recently was given a short course of prednisone which helped slightly with his rash. He does plan on using tanning bed which he reports has UVB light to help with eczema. Patient has no other skin complaints today.  Remainder of the HPI, Meds, PMH, Allergies, FH, and SH was reviewed in chart.  Past Medical History:   Diagnosis Date     Allergic rhinitis due to other allergen        History reviewed. No pertinent surgical history.     Family History   Problem Relation Age of Onset     DIABETES Father      HEART DISEASE Father      DIABETES Sister      HEART DISEASE Mother        Social History     Social History     Marital status:      Spouse name: N/A     Number of children: N/A     Years of education: N/A     Occupational History     Not on file.     Social History Main Topics     Smoking status: Never Smoker     Smokeless tobacco: Current User     Types: Chew      Comment: daily      Alcohol use Yes      Comment: occassionally     Drug use: No     Sexual activity: Yes     Partners: Female     Other Topics Concern     Parent/Sibling W/ Cabg, Mi Or Angioplasty Before 65f 55m? No     Social History Narrative       Outpatient Encounter Prescriptions as of 11/21/2017   Medication Sig Dispense Refill     albuterol (PROAIR HFA/PROVENTIL HFA/VENTOLIN HFA) 108 (90 BASE) MCG/ACT Inhaler Inhale 2 puffs  into the lungs       budesonide-formoterol (SYMBICORT) 80-4.5 MCG/ACT Inhaler Inhale 2 puffs into the lungs 2 times daily       predniSONE (DELTASONE) 10 MG tablet Take 4 pills daily for 7 days 3 tabs daily for 7 days, 2 tabs daily for 7 days, 1 tab daily x 7 days. 70 tablet 0     clobetasol (TEMOVATE) 0.05 % cream Apply sparingly to affected area on hands twice daily as needed.  Do not apply to face. 60 g 3     No facility-administered encounter medications on file as of 11/21/2017.              Review Of Systems  Skin: As above  Eyes: negative  Ears/Nose/Throat: negative  Respiratory: No shortness of breath, dyspnea on exertion, cough, or hemoptysis  Cardiovascular: negative  Gastrointestinal: negative  Genitourinary: negative  Musculoskeletal: negative  Neurologic: negative  Psychiatric: negative  Hematologic/Lymphatic/Immunologic: negative  Endocrine: negative      O:   NAD, WDWN, Alert & Oriented, Mood & Affect wnl, Vitals stable   Here today alone   BP (!) 166/96 (BP Location: Right arm, Cuff Size: Adult Regular)  Pulse 78  Temp 97.5  F (36.4  C)   General appearance normal   Vitals stable   Alert, oriented and in no acute distress      Eczematous plaques on bilateral hands with some vesicles    Mild thin eczematous small plaques on torso       Eyes: Conjunctivae/lids:Normal     ENT: Lips    MSK:Normal    Pulm: Breathing Normal    Neuro/Psych: Orientation:Normal; Mood/Affect:Normal  A/P:  1. Dyshidrotic Eczema on hand with eczema on body  Use cetaphil cleansers and moisturizer on skin.   Apply clobetasol cream twice daily as needed.   Take prednisone taper over next 4 weeks.   Take calcium with Vit D.   If continuing to recur will then discuss methotrexate.   Skin care regimen reviewed with patient: Eliminate harsh soaps, i.e. Dial, zest, irsih spring; Mild soaps such as Cetaphil or Dove sensitive skin, avoid hot or cold showers, aggressive use of emollients including vanicream, cetaphil or cerave discussed  with patient.    Return to clinic 1-2 months or sooner if needed.       Again, thank you for allowing me to participate in the care of your patient.        Sincerely,        Shira Brownlee PA-C

## 2017-11-21 NOTE — NURSING NOTE
"Chief Complaint   Patient presents with     Eczema       Initial BP (!) 166/96 (BP Location: Right arm, Cuff Size: Adult Regular)  Pulse 78  Temp 97.5  F (36.4  C) Estimated body mass index is 25.8 kg/(m^2) as calculated from the following:    Height as of 10/27/17: 1.803 m (5' 11\").    Weight as of 10/27/17: 83.9 kg (185 lb).  Medication Reconciliation: complete  "

## 2017-11-21 NOTE — LETTER
11/21/2017         RE: Kenney Mcgowan  [address] [facility] [address]        Dear Colleague,    Thank you for referring your patient, Kenney Mcgowan, to the [facility]. Please see a copy of my visit note below.    Kenney Mcgowan is a 57 year old year old male patient here today for consult on hand eczema by Violeta Howe CNP.  Patient reports that he has had issues with eczema for the past 4-5 years, worse this past year. He reports that the is a  and washes his hands often. He doesn't always moisturizer. He reports typically his eczema is well controlled with clobetasol. He recently was given a short course of prednisone which helped slightly with his rash. He does plan on using tanning bed which he reports has UVB light to help with eczema. Patient has no other skin complaints today.  Remainder of the HPI, Meds, PMH, Allergies, FH, and SH was reviewed in chart.  Past Medical History:   Diagnosis Date     Allergic rhinitis due to other allergen        History reviewed. No pertinent surgical history.     Family History   Problem Relation Age of Onset     DIABETES Father      HEART DISEASE Father      DIABETES Sister      HEART DISEASE Mother        Social History     Social History     Marital status:      Spouse name: N/A     Number of children: N/A     Years of education: N/A     Occupational History     Not on file.     Social History Main Topics     Smoking status: Never Smoker     Smokeless tobacco: Current User     Types: Chew      Comment: daily      Alcohol use Yes      Comment: occassionally     Drug use: No     Sexual activity: Yes     Partners: Female     Other Topics Concern     Parent/Sibling W/ Cabg, Mi Or Angioplasty Before 65f 55m? No     Social History Narrative       Outpatient Encounter Prescriptions as of 11/21/2017   Medication Sig Dispense Refill     albuterol (PROAIR HFA/PROVENTIL HFA/VENTOLIN HFA) 108 (90 BASE) MCG/ACT Inhaler Inhale 2 puffs  into the lungs       budesonide-formoterol (SYMBICORT) 80-4.5 MCG/ACT Inhaler Inhale 2 puffs into the lungs 2 times daily       predniSONE (DELTASONE) 10 MG tablet Take 4 pills daily for 7 days 3 tabs daily for 7 days, 2 tabs daily for 7 days, 1 tab daily x 7 days. 70 tablet 0     clobetasol (TEMOVATE) 0.05 % cream Apply sparingly to affected area on hands twice daily as needed.  Do not apply to face. 60 g 3     No facility-administered encounter medications on file as of 11/21/2017.              Review Of Systems  Skin: As above  Eyes: negative  Ears/Nose/Throat: negative  Respiratory: No shortness of breath, dyspnea on exertion, cough, or hemoptysis  Cardiovascular: negative  Gastrointestinal: negative  Genitourinary: negative  Musculoskeletal: negative  Neurologic: negative  Psychiatric: negative  Hematologic/Lymphatic/Immunologic: negative  Endocrine: negative      O:   NAD, WDWN, Alert & Oriented, Mood & Affect wnl, Vitals stable   Here today alone   BP (!) 166/96 (BP Location: Right arm, Cuff Size: Adult Regular)  Pulse 78  Temp 97.5  F (36.4  C)   General appearance normal   Vitals stable   Alert, oriented and in no acute distress      Eczematous plaques on bilateral hands with some vesicles    Mild thin eczematous small plaques on torso       Eyes: Conjunctivae/lids:Normal     ENT: Lips    MSK:Normal    Pulm: Breathing Normal    Neuro/Psych: Orientation:Normal; Mood/Affect:Normal  A/P:  1. Dyshidrotic Eczema on hand with eczema on body  Use cetaphil cleansers and moisturizer on skin.   Apply clobetasol cream twice daily as needed.   Take prednisone taper over next 4 weeks.   Take calcium with Vit D.   If continuing to recur will then discuss methotrexate.   Skin care regimen reviewed with patient: Eliminate harsh soaps, i.e. Dial, zest, irsih spring; Mild soaps such as Cetaphil or Dove sensitive skin, avoid hot or cold showers, aggressive use of emollients including vanicream, cetaphil or cerave discussed  with patient.    Return to clinic 1-2 months or sooner if needed.       Again, thank you for allowing me to participate in the care of your patient.        Sincerely,        Shira Brownlee PA-C

## 2017-11-21 NOTE — TELEPHONE ENCOUNTER
Spoke with patient he is seeing dermatology today and will discuss this refill.    Denice Bower RN

## 2017-11-21 NOTE — TELEPHONE ENCOUNTER
Medication not on current med list or med history.  Left message for patient to return call to clinic.    Denice Bower RN

## 2017-11-22 NOTE — PROGRESS NOTES
Kenney Mcgowan is a 57 year old year old male patient here today for consult on hand eczema by Violeta Howe CNP.  Patient reports that he has had issues with eczema for the past 4-5 years, worse this past year. He reports that the is a  and washes his hands often. He doesn't always moisturizer. He reports typically his eczema is well controlled with clobetasol. He recently was given a short course of prednisone which helped slightly with his rash. He does plan on using tanning bed which he reports has UVB light to help with eczema. Patient has no other skin complaints today.  Remainder of the HPI, Meds, PMH, Allergies, FH, and SH was reviewed in chart.  Past Medical History:   Diagnosis Date     Allergic rhinitis due to other allergen        History reviewed. No pertinent surgical history.     Family History   Problem Relation Age of Onset     DIABETES Father      HEART DISEASE Father      DIABETES Sister      HEART DISEASE Mother        Social History     Social History     Marital status:      Spouse name: N/A     Number of children: N/A     Years of education: N/A     Occupational History     Not on file.     Social History Main Topics     Smoking status: Never Smoker     Smokeless tobacco: Current User     Types: Chew      Comment: daily      Alcohol use Yes      Comment: occassionally     Drug use: No     Sexual activity: Yes     Partners: Female     Other Topics Concern     Parent/Sibling W/ Cabg, Mi Or Angioplasty Before 65f 55m? No     Social History Narrative       Outpatient Encounter Prescriptions as of 11/21/2017   Medication Sig Dispense Refill     albuterol (PROAIR HFA/PROVENTIL HFA/VENTOLIN HFA) 108 (90 BASE) MCG/ACT Inhaler Inhale 2 puffs into the lungs       budesonide-formoterol (SYMBICORT) 80-4.5 MCG/ACT Inhaler Inhale 2 puffs into the lungs 2 times daily       predniSONE (DELTASONE) 10 MG tablet Take 4 pills daily for 7 days 3 tabs daily for 7 days, 2 tabs daily for  7 days, 1 tab daily x 7 days. 70 tablet 0     clobetasol (TEMOVATE) 0.05 % cream Apply sparingly to affected area on hands twice daily as needed.  Do not apply to face. 60 g 3     No facility-administered encounter medications on file as of 11/21/2017.              Review Of Systems  Skin: As above  Eyes: negative  Ears/Nose/Throat: negative  Respiratory: No shortness of breath, dyspnea on exertion, cough, or hemoptysis  Cardiovascular: negative  Gastrointestinal: negative  Genitourinary: negative  Musculoskeletal: negative  Neurologic: negative  Psychiatric: negative  Hematologic/Lymphatic/Immunologic: negative  Endocrine: negative      O:   NAD, WDWN, Alert & Oriented, Mood & Affect wnl, Vitals stable   Here today alone   BP (!) 166/96 (BP Location: Right arm, Cuff Size: Adult Regular)  Pulse 78  Temp 97.5  F (36.4  C)   General appearance normal   Vitals stable   Alert, oriented and in no acute distress      Eczematous plaques on bilateral hands with some vesicles    Mild thin eczematous small plaques on torso       Eyes: Conjunctivae/lids:Normal     ENT: Lips    MSK:Normal    Pulm: Breathing Normal    Neuro/Psych: Orientation:Normal; Mood/Affect:Normal  A/P:  1. Dyshidrotic Eczema on hand with eczema on body  Use cetaphil cleansers and moisturizer on skin.   Apply clobetasol cream twice daily as needed.   Take prednisone taper over next 4 weeks.   Take calcium with Vit D.   If continuing to recur will then discuss methotrexate.   Skin care regimen reviewed with patient: Eliminate harsh soaps, i.e. Dial, zest, irsih spring; Mild soaps such as Cetaphil or Dove sensitive skin, avoid hot or cold showers, aggressive use of emollients including vanicream, cetaphil or cerave discussed with patient.    Return to clinic 1-2 months or sooner if needed.

## 2017-12-26 ENCOUNTER — TELEPHONE (OUTPATIENT)
Dept: DERMATOLOGY | Facility: CLINIC | Age: 57
End: 2017-12-26

## 2017-12-26 DIAGNOSIS — L30.1 DYSHIDROTIC ECZEMA: ICD-10-CM

## 2017-12-26 NOTE — TELEPHONE ENCOUNTER
Reason for Call:  Other     Detailed comments: Pt requesting refill on prednisone prescription - Please advise      Phone Number Patient can be reached at: Home number on file [phone] (home)    Best Time: Any    Can we leave a detailed message on this number? YES    Call taken on 12/26/2017 at 12:15 PM by Denise Behrendt

## 2017-12-26 NOTE — TELEPHONE ENCOUNTER
Steroid not under RN refill protocol.  Will forward to MD for refill consideration.  Indira Cadena RN

## 2017-12-27 RX ORDER — PREDNISONE 10 MG/1
TABLET ORAL
Qty: 70 TABLET | Refills: 0 | Status: SHIPPED | OUTPATIENT
Start: 2017-12-27 | End: 2018-07-06

## 2017-12-27 NOTE — TELEPHONE ENCOUNTER
Spoke to pt and he said his hands are not better.  He is going to try the prednisone again and information was given on methotrexate. Advised him to call and discuss treatment options if his hands were not better.  Viridiana MAN RN BSN PHN  Specialty Clinics

## 2017-12-27 NOTE — TELEPHONE ENCOUNTER
Please contact patient. Are his hands starting to flare again? Will give one refill but if he is still having issues we need to discuss long term therapy like methotrexate.

## 2017-12-28 ENCOUNTER — TELEPHONE (OUTPATIENT)
Dept: FAMILY MEDICINE | Facility: CLINIC | Age: 57
End: 2017-12-28

## 2017-12-28 NOTE — TELEPHONE ENCOUNTER
Pt is due for a colon cancer screening and an ACT.      Panel Management Review      Patient has the following on his problem list:     Asthma review     ACT Total Scores 1/5/2017   ACT TOTAL SCORE -   ASTHMA ER VISITS -   ASTHMA HOSPITALIZATIONS -   ACT TOTAL SCORE (Goal Greater than or Equal to 20) 20   In the past 12 months, how many times did you visit the emergency room for your asthma without being admitted to the hospital? 0   In the past 12 months, how many times were you hospitalized overnight because of your asthma? 0      1. Is Asthma diagnosis on the Problem List? Yes    2. Is Asthma listed on Health Maintenance? Yes    3. Patient is due for:  ACT        Composite cancer screening  Chart review shows that this patient is due/due soon for the following Colonoscopy and Fecal Colorectal (FIT)  Summary:    Patient is due/failing the following:   ACT, COLONOSCOPY and FIT    Action needed:   Patient needs office visit for a physical. He states that he will get information regarding a colon cancer screening and complete the ACT at his PE after the first of the year. He states that his asthma is well controlled.  Type of outreach:    Phone, spoke to patient.  .    Questions for provider review:    None                                                                                                                                    Marci Pack, AJIT       Chart routed to NONE .

## 2018-02-26 DIAGNOSIS — L30.1 DYSHIDROTIC ECZEMA: ICD-10-CM

## 2018-02-26 RX ORDER — PREDNISONE 10 MG/1
TABLET ORAL
Qty: 70 TABLET | Refills: 0 | Status: CANCELLED | OUTPATIENT
Start: 2018-02-26

## 2018-02-26 NOTE — TELEPHONE ENCOUNTER
Message left to return call. Needs to be seen.    Was to return to clinic in 1-2 months per 11-21-17 Dermatology dictation:    Return to clinic 1-2 months or sooner if needed.     Nicki Curry RN

## 2018-03-06 NOTE — TELEPHONE ENCOUNTER
"Spoke to patient who stated: \"Things are better now, I am doing ok..\" Advised he will need to be seen if he needs a refill as per 12-26-17 phone encounter:    Shira Noe PA-C        8:55 AM   Note      Please contact patient. Are his hands starting to flare again? Will give one refill but if he is still having issues we need to discuss long term therapy like methotrexate.         Patient verbalized understanding. Nicki Curry RN    "

## 2018-03-21 ENCOUNTER — OFFICE VISIT (OUTPATIENT)
Dept: LAB | Facility: SCHOOL | Age: 58
End: 2018-03-21
Payer: COMMERCIAL

## 2018-03-21 VITALS
RESPIRATION RATE: 16 BRPM | TEMPERATURE: 98.2 F | BODY MASS INDEX: 25.2 KG/M2 | WEIGHT: 180 LBS | SYSTOLIC BLOOD PRESSURE: 128 MMHG | HEART RATE: 74 BPM | OXYGEN SATURATION: 96 % | DIASTOLIC BLOOD PRESSURE: 72 MMHG | HEIGHT: 71 IN

## 2018-03-21 DIAGNOSIS — Z00.00 ENCOUNTER FOR WELLNESS EXAMINATION IN ADULT: Primary | ICD-10-CM

## 2018-03-21 PROCEDURE — 99213 OFFICE O/P EST LOW 20 MIN: CPT | Performed by: NURSE PRACTITIONER

## 2018-03-21 NOTE — PROGRESS NOTES
"  SUBJECTIVE:  CC: Kenney Mcgowan is an 57 year old woman who presents for Wellness Check at [id number].     Review of Healthy Lifestyle:    Do you get at least three servings of calcium containing foods daily (dairy, green leafy vegetables, etc.)? no, taking calcium and/or vitamin D supplement: no     Do you have a high-fiber diet? no     Amount of exercise or daily activities, outside of work: 1-2 day(s) per week    Do you wear sunscreen on a regular basis? No     Are you taking your medications regularly Yes     Have you had an eye exam in the past two years? yes    Do you see a dentist twice per year? yes    Do you have sleep apnea, excessive snoring or excessive daytime drowsiness? no    Do you use tobacco in any form? no       OBJECTIVE:    Vitals: /72 (BP Location: Right arm)  Pulse 74  Temp 98.2  F (36.8  C) (Tympanic)  Resp 16  SpO2 96%  BMI= There is no height or weight on file to calculate BMI.    HEARING: Right Ear:        500Hz:  RESPONSE- on Level:   20 db    1000 Hz: RESPONSE- on Level:   20 db    2000 Hz: RESPONSE- on Level:   20 db    4000 Hz: RESPONSE- on Level:   20 db     Left Ear:       500Hz:  RESPONSE- on Level:   20 db    1000 Hz: RESPONSE- on Level:   20 db    2000 Hz: RESPONSE- on Level: tone not heard   4000 Hz: RESPONSE- on Level: tone not heard    VISION:  Right eye:  20/80  Left eye:     20/80  Both eyes: 20/80  Corrective lenses?  Yes    Medication Reconciliation: complete    ASSESSMENT/PLAN:  (Z00.00) Encounter for wellness examination in adult  (primary encounter diagnosis)  Comment:    Plan: Lipid panel reflex to direct LDL Fasting,         Glucose                 COUNSELING:      reports that he has never smoked. He has quit using smokeless tobacco. His smokeless tobacco use included Chew.    Estimated body mass index is 25.8 kg/(m^2) as calculated from the following:    Height as of 10/27/17: 5' 11\" (1.803 m).    Weight as of 10/27/17: 185 " giulia (83.9 kg).       Counseling Resources  [employer] My[employer]ate  [email or web address]    [place] SCHOOL PROVIDER  [facility]

## 2018-03-21 NOTE — NURSING NOTE
"Initial /72 (BP Location: Right arm)  Pulse 74  Temp 98.2  F (36.8  C) (Tympanic)  Resp 16  Ht 5' 11\" (1.803 m)  Wt 180 lb (81.6 kg)  SpO2 96%  BMI 25.1 kg/m2 Estimated body mass index is 25.1 kg/(m^2) as calculated from the following:    Height as of this encounter: 5' 11\" (1.803 m).    Weight as of this encounter: 180 lb (81.6 kg). .    Roxie Ramires CMA ([facility])  "

## 2018-03-21 NOTE — MR AVS SNAPSHOT
After Visit Summary   3/21/2018    Kenney Mcgowan    MRN: [id number]           Patient Information     Date Of Birth          1960        Visit Information        Provider Department      3/21/2018 3:00 PM Violeta Howe NP [facility]         Today's Diagnoses     Encounter for wellness examination in adult    -  1      Care Instructions    Wellness Visit Recommendations:      See your regular primary care health provider every year in order to help stay healthy.    Review health changes.     Review your medicines if your doctor has prescribed any.    Talk to your provider about how often to have your cholesterol checked.    If you are at risk for diabetes, you should have a diabetes test (fasting glucose).    Shots: Get a flu shot each year. Get a tetanus shot every 10 years.     Review with your primary care provider other immunizations that you may need based on your age and/or medical/surgical history    Nutrition:     Eat at least 5 servings of fruits and vegetables each day.    Eat whole-grain bread, whole-wheat pasta and brown rice instead of white grains and rice.    Preventive Care to be reviewed by your primary care provider:    Females:        Cervical Cancer Screening                          Breast Cancer Screening                          Colon Cancer Screening  Males:             Prostrate Cancer Screening                          Colon Cancer Screening      Lifestyle:    Exercise at least 150 minutes a week (30 minutes a day, 5 days of the week). This will help you control your weight and help prevent disease or manage disease.    Limit alcohol to one drink per day or less depending on your past medical history.    No smoking.     Wear sunscreen to prevent skin cancer.    See your dentist every six months for an exam and cleaning.    Today's Vital Signs:  /72 (BP Location: Right arm)  Pulse 74  Temp 98.2  F (36.8  C) (Tympanic)  Resp 16  Ht 5'  "11\" (1.803 m)  Wt 180 lb (81.6 kg)  SpO2 96%  BMI 25.1 kg/m2                                                                                             Kenney Mcgowan has completed a Wellness Check at the [facility] on 3/21/2018.      ____________________________________________  [employer] SCHOOL PROVIDER                     Follow-ups after your visit        Future tests that were ordered for you today     Open Future Orders        Priority Expected Expires Ordered    Lipid panel reflex to direct LDL Fasting Routine  3/21/2019 3/21/2018    Glucose Routine  3/21/2019 3/21/2018            Who to contact     If you have questions or need follow up information about today's clinic visit or your schedule please contact [facility] directly at [phone].  Normal or non-critical lab and imaging results will be communicated to you by MyChart, letter or phone within 4 business days after the clinic has received the results. If you do not hear from us within 7 days, please contact the clinic through [employer] or phone. If you have a critical or abnormal lab result, we will notify you by phone as soon as possible.  Submit refill requests through [employer] or call your pharmacy and they will forward the refill request to us. Please allow 3 business days for your refill to be completed.          Additional Information About Your Visit        PrevistarharDezide Information     [employer] lets you send messages to your doctor, view your test results, renew your prescriptions, schedule appointments and more. To sign up, go to [email or web address] . Click on \"Log in\" on the left side of the screen, which will take you to the Welcome page. Then click on \"Sign up Now\" on the right side of the page.     You will be asked to enter the access code listed below, as well as some personal information. Please follow the directions to create your username and password.     Your access code is: " [email or web address]  Expires: 2018  3:29 PM     Your access code will  in 90 days. If you need help or a new code, please call your [facility] clinic or [phone].        Care EveryWhere ID     This is your Care EveryWhere ID. This could be used by other organizations to access your [facility] medical records  [id number]        Your Vitals Were     Pulse Temperature Respirations Height Pulse Oximetry BMI (Body Mass Index)    74 98.2  F (36.8  C) (Tympanic) 16 5' 11\" (1.803 m) 96% 25.1 kg/m2       Blood Pressure from Last 3 Encounters:   18 128/72   17 (!) 166/96   10/27/17 138/78    Weight from Last 3 Encounters:   18 180 lb (81.6 kg)   10/27/17 185 lb (83.9 kg)   17 185 lb 12.8 oz (84.3 kg)               Primary Care Provider Office Phone # Fax #    Rakan Laureano [phone] [address] [facility] [address]        Equal Access to Services     KUMAR DE LA GARZA AH: Hadii aad ku hadasho Soomaali, waaxda luqadaha, qaybta kaalmada adeegyada, albert matos. So [facility] [place] [facility] [phone].    ATENCIÓN: Si habla español, tiene a moran disposición servicios gratuitos de asistencia lingüística. [facility] [phone].    We comply with applicable federal civil rights laws and [place] laws. We do not discriminate on the basis of race, color, national origin, age, disability, sex, sexual orientation, or gender identity.            Thank you!     Thank you for choosing [place] [address]  for your care. Our goal is always to provide you with excellent care. Hearing back from our patients is one way we can continue to improve our services. Please take a few minutes to complete the written survey that you may receive in the mail after your visit with us. Thank you!             Your Updated Medication List - Protect others around you: Learn how to safely use, store and throw away your medicines at www.disposemymeds.org.          This " list is accurate as of 3/21/18  3:29 PM.  Always use your most recent med list.                   Brand Name Dispense Instructions for use Diagnosis    albuterol 108 (90 BASE) MCG/ACT Inhaler    PROAIR HFA/PROVENTIL HFA/VENTOLIN HFA     Inhale 2 puffs into the lungs        budesonide-formoterol 80-4.5 MCG/ACT Inhaler    SYMBICORT     Inhale 2 puffs into the lungs 2 times daily        clobetasol 0.05 % cream    TEMOVATE    60 g    Apply sparingly to affected area on hands twice daily as needed.  Do not apply to face.    Dyshidrotic eczema       predniSONE 10 MG tablet    DELTASONE    70 tablet    Take 4 pills daily for 7 days 3 tabs daily for 7 days, 2 tabs daily for 7 days, 1 tab daily x 7 days.    Dyshidrotic eczema

## 2018-03-21 NOTE — PATIENT INSTRUCTIONS
"Wellness Visit Recommendations:      See your regular primary care health provider every year in order to help stay healthy.    Review health changes.     Review your medicines if your doctor has prescribed any.    Talk to your provider about how often to have your cholesterol checked.    If you are at risk for diabetes, you should have a diabetes test (fasting glucose).    Shots: Get a flu shot each year. Get a tetanus shot every 10 years.     Review with your primary care provider other immunizations that you may need based on your age and/or medical/surgical history    Nutrition:     Eat at least 5 servings of fruits and vegetables each day.    Eat whole-grain bread, whole-wheat pasta and brown rice instead of white grains and rice.    Preventive Care to be reviewed by your primary care provider:    Females:        Cervical Cancer Screening                          Breast Cancer Screening                          Colon Cancer Screening  Males:             Prostrate Cancer Screening                          Colon Cancer Screening      Lifestyle:    Exercise at least 150 minutes a week (30 minutes a day, 5 days of the week). This will help you control your weight and help prevent disease or manage disease.    Limit alcohol to one drink per day or less depending on your past medical history.    No smoking.     Wear sunscreen to prevent skin cancer.    See your dentist every six months for an exam and cleaning.    Today's Vital Signs:  /72 (BP Location: Right arm)  Pulse 74  Temp 98.2  F (36.8  C) (Tympanic)  Resp 16  Ht 5' 11\" (1.803 m)  Wt 180 lb (81.6 kg)  SpO2 96%  BMI 25.1 kg/m2                                                                                             Kenney Mcgowan has completed a Wellness Check at the [place] [address] [facility] on 3/21/2018.      ____________________________________________  [employer] SCHOOL PROVIDER             "

## 2018-07-06 ENCOUNTER — TELEPHONE (OUTPATIENT)
Dept: DERMATOLOGY | Facility: CLINIC | Age: 58
End: 2018-07-06

## 2018-07-06 ENCOUNTER — OFFICE VISIT (OUTPATIENT)
Dept: FAMILY MEDICINE | Facility: CLINIC | Age: 58
End: 2018-07-06
Payer: COMMERCIAL

## 2018-07-06 VITALS
BODY MASS INDEX: 25.76 KG/M2 | DIASTOLIC BLOOD PRESSURE: 82 MMHG | HEIGHT: 71 IN | OXYGEN SATURATION: 95 % | HEART RATE: 87 BPM | TEMPERATURE: 98.2 F | WEIGHT: 184 LBS | SYSTOLIC BLOOD PRESSURE: 136 MMHG

## 2018-07-06 DIAGNOSIS — L30.1 DYSHIDROTIC ECZEMA: ICD-10-CM

## 2018-07-06 DIAGNOSIS — J45.30 MILD PERSISTENT ASTHMA WITHOUT COMPLICATION: Primary | ICD-10-CM

## 2018-07-06 PROCEDURE — 99214 OFFICE O/P EST MOD 30 MIN: CPT | Performed by: NURSE PRACTITIONER

## 2018-07-06 RX ORDER — ALBUTEROL SULFATE 90 UG/1
2 AEROSOL, METERED RESPIRATORY (INHALATION) EVERY 4 HOURS PRN
Qty: 1 INHALER | Refills: 11 | Status: SHIPPED | OUTPATIENT
Start: 2018-07-06 | End: 2019-09-09

## 2018-07-06 RX ORDER — PREDNISONE 10 MG/1
TABLET ORAL
Qty: 70 TABLET | Refills: 0 | Status: SHIPPED | OUTPATIENT
Start: 2018-07-06 | End: 2018-10-15

## 2018-07-06 RX ORDER — BUDESONIDE AND FORMOTEROL FUMARATE DIHYDRATE 80; 4.5 UG/1; UG/1
2 AEROSOL RESPIRATORY (INHALATION) 2 TIMES DAILY
Qty: 1 INHALER | Refills: 11 | Status: SHIPPED | OUTPATIENT
Start: 2018-07-06 | End: 2019-09-09

## 2018-07-06 RX ORDER — CLOBETASOL PROPIONATE 0.5 MG/G
CREAM TOPICAL
Qty: 60 G | Refills: 3 | Status: SHIPPED | OUTPATIENT
Start: 2018-07-06 | End: 2019-09-09

## 2018-07-06 RX ORDER — CLOBETASOL PROPIONATE 0.5 MG/G
CREAM TOPICAL
Qty: 60 G | Refills: 0 | Status: CANCELLED | OUTPATIENT
Start: 2018-07-06

## 2018-07-06 NOTE — TELEPHONE ENCOUNTER
"Patient made appointment with Dermatology for 7/26/18.  States hands are red, itchy, no open areas or drainage.   Has used prednisone in past when same \"flare\" has happened.     Patient wondering if he could try steroid in meantime (until able to be seen in clinic)?  Or should patient be seen sooner?     Patient is being seen by PCP @ 1pm for asthma- will ask provider at that time if needed    Thank you,   Christiano CHENG RN   Specialty Clinics   "

## 2018-07-06 NOTE — PROGRESS NOTES
"  SUBJECTIVE:   Kenney Mcgowan is a 58 year old male who presents to clinic today for the following health issues:      Chief Complaint   Patient presents with     Asthma     worsening the last couple months     Health Maintenance     due for colon cancer screening.-  will do;      Refill Request     also asking for refill of clobetasol- hand dermatitis         Asthma Follow-Up    Was ACT completed today?    Yes    ACT Total Scores 7/6/2018   ACT TOTAL SCORE -   ASTHMA ER VISITS -   ASTHMA HOSPITALIZATIONS -   ACT TOTAL SCORE (Goal Greater than or Equal to 20) 15   In the past 12 months, how many times did you visit the emergency room for your asthma without being admitted to the hospital? 0   In the past 12 months, how many times were you hospitalized overnight because of your asthma? 0       Recent asthma triggers that patient is dealing with: humidity, strong odors and fumes and exercise or sports        Amount of exercise or physical activity: 3-4 days per week    Problems taking medications regularly: No-  Had stopped his inhalers for a while because symptoms had improved.  Recently went back on.    Medication side effects: none    Diet: regular (no restrictions)- / paleo diet          Problem list and histories reviewed & adjusted, as indicated.  Additional history: as documented    Reviewed and updated as needed this visit by clinical staff  Tobacco  Allergies  Meds       Reviewed and updated as needed this visit by Provider         ROS:  Constitutional, HEENT, cardiovascular, pulmonary, gi and gu systems are negative, except as otherwise noted.    OBJECTIVE:     /82  Pulse 87  Temp 98.2  F (36.8  C) (Tympanic)  Ht 5' 11\" (1.803 m)  Wt 184 lb (83.5 kg)  SpO2 95%  BMI 25.66 kg/m2  Body mass index is 25.66 kg/(m^2).  GENERAL: healthy, alert and no distress  RESP: lungs clear to auscultation - no rales, rhonchi or wheezes  CV: regular rate and rhythm, normal S1 S2, no S3 or S4, no murmur, click or " rub, no peripheral edema and peripheral pulses strong      ASSESSMENT/PLAN:       ICD-10-CM    1. Mild persistent asthma without complication J45.30 Poorly controlled.  Restart budesonide-formoterol (SYMBICORT) 80-4.5 MCG/ACT Inhaler BID     albuterol (PROAIR HFA/PROVENTIL HFA/VENTOLIN HFA) 108 (90 Base) MCG/ACT Inhaler  Follow up in 2 weeks if not improved     2. Dyshidrotic eczema L30.1 clobetasol (TEMOVATE) 0.05 % cream         The risks, benefits and treatment options of prescribed medications or other treatments have been discussed with the patient. The patient verbalized their understanding and should call or follow up if no improvement or if they develop further problems.    DERICK [facility]

## 2018-07-06 NOTE — TELEPHONE ENCOUNTER
Pt called stating that he would like a refill on one of his RX, however, pt did not know the name of it. Pt would like RX sent to [facility]. Please contact pt.    Stacie [facility] [place]

## 2018-07-06 NOTE — MR AVS SNAPSHOT
"              After Visit Summary   7/6/2018    Kenney Mcgowan    MRN: [id number]           Patient Information     Date Of Birth          1960        Visit Information        Provider Department      7/6/2018 1:00 PM Marci Ybarra APRN CNP [facility]        Today's Diagnoses     Mild persistent asthma without complication    -  1    Dyshidrotic eczema           Follow-ups after your visit        Your next 10 appointments already scheduled     Jul 26, 2018  1:00 PM CDT   Return Visit with Shira Noe PA-C   [facility]    [address] [facility] [id number] [phone]              Who to contact     If you have questions or need follow up information about today's clinic visit or your schedule please contact [facility] directly at [phone].  Normal or non-critical lab and imaging results will be communicated to you by MyChart, letter or phone within 4 business days after the clinic has received the results. If you do not hear from us within 7 days, please contact the clinic through MyChart or phone. If you have a critical or abnormal lab result, we will notify you by phone as soon as possible.  Submit refill requests through [employer] or call your pharmacy and they will forward the refill request to us. Please allow 3 business days for your refill to be completed.          Additional Information About Your Visit        Care EveryWhere ID     This is your Care EveryWhere ID. This could be used by other organizations to access your [facility] medical records  [id number]        Your Vitals Were     Pulse Temperature Height Pulse Oximetry BMI (Body Mass Index)       87 98.2  F (36.8  C) (Tympanic) 5' 11\" (1.803 m) 95% 25.66 kg/m2        Blood Pressure from Last 3 Encounters:   07/06/18 136/82   03/21/18 128/72   11/21/17 (!) 166/96    Weight from Last 3 Encounters:   07/06/18 184 lb (83.5 kg)   03/21/18 180 lb " (81.6 kg)   10/27/17 185 lb (83.9 kg)              Today, you had the following     No orders found for display         Today's Medication Changes          These changes are accurate as of 7/6/18  1:32 PM.  If you have any questions, ask your nurse or doctor.               These medicines have changed or have updated prescriptions.        Dose/Directions    albuterol 108 (90 Base) MCG/ACT Inhaler   Commonly known as:  PROAIR HFA/PROVENTIL HFA/VENTOLIN HFA   This may have changed:    - when to take this  - reasons to take this   Used for:  Mild persistent asthma without complication   Changed by:  Marci Ybarra APRN CNP        Dose:  2 puff   Inhale 2 puffs into the lungs every 4 hours as needed for shortness of breath / dyspnea or wheezing   Quantity:  1 Inhaler   Refills:  11            Where to get your medicines      These medications were sent to [facility] - [address] [address]     Phone:  [phone]     albuterol 108 (90 Base) MCG/ACT Inhaler    budesonide-formoterol 80-4.5 MCG/ACT Inhaler    clobetasol 0.05 % cream    predniSONE 10 MG tablet                Primary Care Provider Office Phone # Fax #    Emilymacy Cj Laureano [phone] [address] [facility] [address]        Equal Access to Services     LOREN DE LA GARZA : Hadtommy rivaso Soevelyn, waaxda luqadaha, qaybta kaalmada marky, albert matos. So [facility] [phone].    ATENCIÓN: Si habla español, tiene a moran disposición servicios gratuitos de asistencia lingüística. Jimmie garza [phone].    We comply with applicable federal civil rights laws and [place] laws. We do not discriminate on the basis of race, color, national origin, age, disability, sex, sexual orientation, or gender identity.            Thank you!     Thank you for choosing [facility]  for your care. Our goal is always to provide  you with excellent care. Hearing back from our patients is one way we can continue to improve our services. Please take a few minutes to complete the written survey that you may receive in the mail after your visit with us. Thank you!             Your Updated Medication List - Protect others around you: Learn how to safely use, store and throw away your medicines at www.disposemymeds.org.          This list is accurate as of 7/6/18  1:32 PM.  Always use your most recent med list.                   Brand Name Dispense Instructions for use Diagnosis    albuterol 108 (90 Base) MCG/ACT Inhaler    PROAIR HFA/PROVENTIL HFA/VENTOLIN HFA    1 Inhaler    Inhale 2 puffs into the lungs every 4 hours as needed for shortness of breath / dyspnea or wheezing    Mild persistent asthma without complication       budesonide-formoterol 80-4.5 MCG/ACT Inhaler    SYMBICORT    1 Inhaler    Inhale 2 puffs into the lungs 2 times daily    Mild persistent asthma without complication       clobetasol 0.05 % cream    TEMOVATE    60 g    Apply sparingly to affected area on hands twice daily as needed.  Do not apply to face.    Dyshidrotic eczema       predniSONE 10 MG tablet    DELTASONE    70 tablet    Take 4 pills daily for 7 days 3 tabs daily for 7 days, 2 tabs daily for 7 days, 1 tab daily x 7 days.    Dyshidrotic eczema

## 2018-07-06 NOTE — TELEPHONE ENCOUNTER
Left message on identified voicemail of medication sent to pharmacy and to keep appt to discuss long term medications. Advised to call if he had questions.  Viridiana MAN RN BSN PHN  Specialty Clinics

## 2018-07-06 NOTE — TELEPHONE ENCOUNTER
I spoke to Kenney today. He said that the clobetasol has not been helping at all. He has rashes all over his hands. I suggested that he make an appointment. He is agreeable to this and is transferred to the scheduled in dermatology. Zoey RAMOS Rn

## 2018-07-06 NOTE — TELEPHONE ENCOUNTER
Left message for pt to call back about refill request. Appears to be clobetasol and if it is then ok to courtesy refill one time as he is due for appt.  Viridiana MAN RN BSN PHN  Specialty Clinics

## 2018-07-06 NOTE — TELEPHONE ENCOUNTER
I will send in prednisone to help with current flare, please still have him return so we can discuss methotrexate or dupixent to be a long term medication.

## 2018-07-07 ASSESSMENT — ASTHMA QUESTIONNAIRES: ACT_TOTALSCORE: 15

## 2018-08-16 ENCOUNTER — TELEPHONE (OUTPATIENT)
Dept: DERMATOLOGY | Facility: CLINIC | Age: 58
End: 2018-08-16

## 2018-08-16 NOTE — TELEPHONE ENCOUNTER
Reason for Call:  Other     Detailed comments: Pt is done with prednisone and still having some itching of his hands. He is also using clobetasol; it is helping, but the symptoms are not going away. Is there anything else that can be done - does he need to be seen again? - Please advise    PHARMACY:  [facility]    Phone Number Patient can be reached at: Home number on file [phone] (home)    Best Time: Any    Can we leave a detailed message on this number? YES    Call taken on 8/16/2018 at 2:45 PM by Denise Behrendt

## 2018-08-16 NOTE — TELEPHONE ENCOUNTER
Please see note below and advise. Pt cancelled last appt.   Viridiana MAN RN BSN PHN  Specialty Clinics

## 2018-08-17 NOTE — TELEPHONE ENCOUNTER
Left message on identified voicemail that an appt is needed and to call and make appt. Ok to schedule in a hold on Sept 4th.  Viridiana MAN RN BSN PHN  Specialty Clinics

## 2018-08-17 NOTE — TELEPHONE ENCOUNTER
Yes he needs to get seen to discuss alternative medications: dupixent vs methotrexate.   Can offer him an appt on September 4th I have a few holds that day.

## 2018-09-25 ENCOUNTER — OFFICE VISIT (OUTPATIENT)
Dept: DERMATOLOGY | Facility: CLINIC | Age: 58
End: 2018-09-25
Payer: COMMERCIAL

## 2018-09-25 VITALS — OXYGEN SATURATION: 98 % | HEART RATE: 78 BPM | DIASTOLIC BLOOD PRESSURE: 85 MMHG | SYSTOLIC BLOOD PRESSURE: 145 MMHG

## 2018-09-25 DIAGNOSIS — L20.89 OTHER ATOPIC DERMATITIS: Primary | ICD-10-CM

## 2018-09-25 PROCEDURE — 99213 OFFICE O/P EST LOW 20 MIN: CPT | Performed by: PHYSICIAN ASSISTANT

## 2018-09-25 NOTE — LETTER
9/25/2018         RE: Kenney Mcgowan  [address] [facility] [address]        Dear Colleague,    Thank you for referring your patient, Kenney Mcgowan, to the [facility]. Please see a copy of my visit note below.    Kenney Mcgowan is a 58 year old year old male patient here today for recheck eczema on hands, arms, legs.  Patient reports that he is using cetaphil lotion daily. He uses cetaphil cleanser when showering. He reports that it will wax and wane. When he has flared her will get prednisone taper which does help but then returns when he is finished.  Associated symptoms: none.  Patient has no other skin complaints today.  Remainder of the HPI, Meds, PMH, Allergies, FH, and SH was reviewed in chart.    Pertinent Hx:  Atopic Dermatitis   Past Medical History:   Diagnosis Date     Allergic rhinitis due to other allergen        History reviewed. No pertinent surgical history.     Family History   Problem Relation Age of Onset     Diabetes Father      HEART DISEASE Father      Diabetes Sister      HEART DISEASE Mother        Social History     Social History     Marital status:      Spouse name: N/A     Number of children: N/A     Years of education: N/A     Occupational History     Not on file.     Social History Main Topics     Smoking status: Never Smoker     Smokeless tobacco: Former User     Types: Chew     Alcohol use Yes      Comment: occassionally     Drug use: No     Sexual activity: Yes     Partners: Female     Other Topics Concern     Parent/Sibling W/ Cabg, Mi Or Angioplasty Before 65f 55m? No     Social History Narrative       Outpatient Encounter Prescriptions as of 9/25/2018   Medication Sig Dispense Refill     albuterol (PROAIR HFA/PROVENTIL HFA/VENTOLIN HFA) 108 (90 Base) MCG/ACT Inhaler Inhale 2 puffs into the lungs every 4 hours as needed for shortness of breath / dyspnea or wheezing 1 Inhaler 11     budesonide-formoterol (SYMBICORT) 80-4.5 MCG/ACT Inhaler Inhale 2 puffs into  the lungs 2 times daily 1 Inhaler 11     clobetasol (TEMOVATE) 0.05 % cream Apply sparingly to affected area on hands twice daily as needed.  Do not apply to face. 60 g 3     predniSONE (DELTASONE) 10 MG tablet Take 4 pills daily for 7 days 3 tabs daily for 7 days, 2 tabs daily for 7 days, 1 tab daily x 7 days. (Patient not taking: Reported on 9/25/2018) 70 tablet 0     No facility-administered encounter medications on file as of 9/25/2018.              Review Of Systems  Skin: As above  Eyes: negative  Ears/Nose/Throat: negative  Respiratory: No shortness of breath, dyspnea on exertion, cough, or hemoptysis  Cardiovascular: negative  Gastrointestinal: negative  Genitourinary: negative  Musculoskeletal: negative  Neurologic: negative  Psychiatric: negative  Hematologic/Lymphatic/Immunologic: negative  Endocrine: negative      O:   NAD, WDWN, Alert & Oriented, Mood & Affect wnl, Vitals stable   Here today alone   /85  Pulse 78  SpO2 98%   General appearance normal   Vitals stable   Alert, oriented and in no acute distress     Eczematous thin plaques on hands, arms, legs       Eyes: Conjunctivae/lids:Normal     ENT: Lips: normal    MSK:Normal    Pulm: Breathing Normal    Neuro/Psych: Orientation:Normal; Mood/Affect:Normal  A/P:  1. Atopic Dermatitis   Consider Dupixent, NB-UVB, and methotrexate.   Patient took handouts on dupixent, nb-uvb, and protopic. Does not want to do methotrexate.  Moisturize three times daily.   Continue topical clobetasol as needed.  Will let us know what he decides to choose for treatment.   Patient to follow up with Primary Care provider regarding elevated blood pressure.        Again, thank you for allowing me to participate in the care of your patient.        Sincerely,        Shria Brownlee PA-C

## 2018-09-25 NOTE — PROGRESS NOTES
Kenney Mcgowan is a 58 year old year old male patient here today for recheck eczema on hands, arms, legs.  Patient reports that he is using cetaphil lotion daily. He uses cetaphil cleanser when showering. He reports that it will wax and wane. When he has flared her will get prednisone taper which does help but then returns when he is finished.  Associated symptoms: none.  Patient has no other skin complaints today.  Remainder of the HPI, Meds, PMH, Allergies, FH, and SH was reviewed in chart.    Pertinent Hx:  Atopic Dermatitis   Past Medical History:   Diagnosis Date     Allergic rhinitis due to other allergen        History reviewed. No pertinent surgical history.     Family History   Problem Relation Age of Onset     Diabetes Father      HEART DISEASE Father      Diabetes Sister      HEART DISEASE Mother        Social History     Social History     Marital status:      Spouse name: N/A     Number of children: N/A     Years of education: N/A     Occupational History     Not on file.     Social History Main Topics     Smoking status: Never Smoker     Smokeless tobacco: Former User     Types: Chew     Alcohol use Yes      Comment: occassionally     Drug use: No     Sexual activity: Yes     Partners: Female     Other Topics Concern     Parent/Sibling W/ Cabg, Mi Or Angioplasty Before 65f 55m? No     Social History Narrative       Outpatient Encounter Prescriptions as of 9/25/2018   Medication Sig Dispense Refill     albuterol (PROAIR HFA/PROVENTIL HFA/VENTOLIN HFA) 108 (90 Base) MCG/ACT Inhaler Inhale 2 puffs into the lungs every 4 hours as needed for shortness of breath / dyspnea or wheezing 1 Inhaler 11     budesonide-formoterol (SYMBICORT) 80-4.5 MCG/ACT Inhaler Inhale 2 puffs into the lungs 2 times daily 1 Inhaler 11     clobetasol (TEMOVATE) 0.05 % cream Apply sparingly to affected area on hands twice daily as needed.  Do not apply to face. 60 g 3     predniSONE (DELTASONE) 10 MG tablet Take 4 pills daily for  7 days 3 tabs daily for 7 days, 2 tabs daily for 7 days, 1 tab daily x 7 days. (Patient not taking: Reported on 9/25/2018) 70 tablet 0     No facility-administered encounter medications on file as of 9/25/2018.              Review Of Systems  Skin: As above  Eyes: negative  Ears/Nose/Throat: negative  Respiratory: No shortness of breath, dyspnea on exertion, cough, or hemoptysis  Cardiovascular: negative  Gastrointestinal: negative  Genitourinary: negative  Musculoskeletal: negative  Neurologic: negative  Psychiatric: negative  Hematologic/Lymphatic/Immunologic: negative  Endocrine: negative      O:   NAD, WDWN, Alert & Oriented, Mood & Affect wnl, Vitals stable   Here today alone   /85  Pulse 78  SpO2 98%   General appearance normal   Vitals stable   Alert, oriented and in no acute distress     Eczematous thin plaques on hands, arms, legs       Eyes: Conjunctivae/lids:Normal     ENT: Lips: normal    MSK:Normal    Pulm: Breathing Normal    Neuro/Psych: Orientation:Normal; Mood/Affect:Normal  A/P:  1. Atopic Dermatitis   Consider Dupixent, NB-UVB, and methotrexate.   Patient took handouts on dupixent, nb-uvb, and protopic. Does not want to do methotrexate.  Moisturize three times daily.   Continue topical clobetasol as needed.  Will let us know what he decides to choose for treatment.   Patient to follow up with Primary Care provider regarding elevated blood pressure.

## 2018-09-25 NOTE — MR AVS SNAPSHOT
After Visit Summary   9/25/2018    Kenney Mcgowan    MRN: [id number]           Patient Information     Date Of Birth          1960        Visit Information        Provider Department      9/25/2018 3:40 PM Shira Noe PA-C [facility]        Today's Diagnoses     Other atopic dermatitis    -  1       Follow-ups after your visit        Who to contact     If you have questions or need follow up information about today's clinic visit or your schedule please contact [facility] directly at [phone].  Normal or non-critical lab and imaging results will be communicated to you by MyChart, letter or phone within 4 business days after the clinic has received the results. If you do not hear from us within 7 days, please contact the clinic through MyChart or phone. If you have a critical or abnormal lab result, we will notify you by phone as soon as possible.  Submit refill requests through [employer] or call your pharmacy and they will forward the refill request to us. Please allow 3 business days for your refill to be completed.          Additional Information About Your Visit        Care EveryWhere ID     This is your Care EveryWhere ID. This could be used by other organizations to access your [place] medical records  [id number]        Your Vitals Were     Pulse Pulse Oximetry                78 98%           Blood Pressure from Last 3 Encounters:   09/25/18 145/85   07/06/18 136/82   03/21/18 128/72    Weight from Last 3 Encounters:   07/06/18 83.5 kg (184 lb)   03/21/18 81.6 kg (180 lb)   10/27/17 83.9 kg (185 lb)              Today, you had the following     No orders found for display       Primary Care Provider Office Phone # Fax #    Rakan Laureano [phone] [address] [facility] [address]        Equal Access to Services     LOREN DE LA GARZA AH: handy Candelario qaybta kaalmada adeegyada, waxay  mark shindenton salcido'aan ah. So [facility] [phone].    ATENCIÓN: Si beatricela chandu, tiene a moran disposición servicios gratuitos de asistencia lingüística. Jimmie al [phone].    We comply with applicable federal civil rights laws and [place] laws. We do not discriminate on the basis of race, color, national origin, age, disability, sex, sexual orientation, or gender identity.            Thank you!     Thank you for choosing [facility]  for your care. Our goal is always to provide you with excellent care. Hearing back from our patients is one way we can continue to improve our services. Please take a few minutes to complete the written survey that you may receive in the mail after your visit with us. Thank you!             Your Updated Medication List - Protect others around you: Learn how to safely use, store and throw away your medicines at www.disposemymeds.org.          This list is accurate as of 9/25/18  5:02 PM.  Always use your most recent med list.                   Brand Name Dispense Instructions for use Diagnosis    albuterol 108 (90 Base) MCG/ACT inhaler    PROAIR HFA/PROVENTIL HFA/VENTOLIN HFA    1 Inhaler    Inhale 2 puffs into the lungs every 4 hours as needed for shortness of breath / dyspnea or wheezing    Mild persistent asthma without complication       budesonide-formoterol 80-4.5 MCG/ACT Inhaler    SYMBICORT    1 Inhaler    Inhale 2 puffs into the lungs 2 times daily    Mild persistent asthma without complication       clobetasol 0.05 % cream    TEMOVATE    60 g    Apply sparingly to affected area on hands twice daily as needed.  Do not apply to face.    Dyshidrotic eczema       predniSONE 10 MG tablet    DELTASONE    70 tablet    Take 4 pills daily for 7 days 3 tabs daily for 7 days, 2 tabs daily for 7 days, 1 tab daily x 7 days.    Dyshidrotic eczema

## 2018-10-15 ENCOUNTER — OFFICE VISIT (OUTPATIENT)
Dept: LAB | Facility: SCHOOL | Age: 58
End: 2018-10-15
Payer: COMMERCIAL

## 2018-10-15 VITALS
OXYGEN SATURATION: 97 % | RESPIRATION RATE: 16 BRPM | BODY MASS INDEX: 26.04 KG/M2 | SYSTOLIC BLOOD PRESSURE: 150 MMHG | HEIGHT: 71 IN | TEMPERATURE: 96.4 F | WEIGHT: 186 LBS | HEART RATE: 77 BPM | DIASTOLIC BLOOD PRESSURE: 84 MMHG

## 2018-10-15 DIAGNOSIS — Z00.00 WELLNESS EXAMINATION: Primary | ICD-10-CM

## 2018-10-15 DIAGNOSIS — R03.0 ELEVATED BLOOD PRESSURE READING WITHOUT DIAGNOSIS OF HYPERTENSION: ICD-10-CM

## 2018-10-15 PROCEDURE — 99213 OFFICE O/P EST LOW 20 MIN: CPT | Performed by: NURSE PRACTITIONER

## 2018-10-15 NOTE — PATIENT INSTRUCTIONS
"                Kenney Mcgowan has completed a Wellness Check at the [facility] on 10/15/2018.      ____________________________________________  [place] SCHOOL PROVIDER                                                                               Wellness Visit Recommendations:      See your regular primary care health provider every year in order to help stay healthy.    Review health changes.     Review your medicines if your doctor has prescribed any.    Talk to your provider about how often to have your cholesterol checked.    If you are at risk for diabetes, you should have a diabetes test (fasting glucose).    Shots: Get a flu shot each year. Get a tetanus shot every 10 years.     Review with your primary care provider other immunizations that you may need based on your age and/or medical/surgical history    Nutrition:     Eat at least 5 servings of fruits and vegetables each day.    Eat whole-grain bread, whole-wheat pasta and brown rice instead of white grains and rice.    Preventive Care to be reviewed by your primary care provider:    Females:        Cervical Cancer Screening                          Breast Cancer Screening                          Colon Cancer Screening  Males:             Prostrate Cancer Screening                          Colon Cancer Screening      Lifestyle:    Exercise at least 150 minutes a week (30 minutes a day, 5 days of the week). This will help you control your weight and help prevent disease or manage disease.    Limit alcohol to one drink per day or less depending on your past medical history.    No smoking.     Wear sunscreen to prevent skin cancer.    See your dentist every six months for an exam and cleaning.    Today's Vital Signs:  /84 (BP Location: Right arm, Patient Position: Sitting, Cuff Size: Adult Regular)  Pulse 77  Temp 96.4  F (35.8  C) (Tympanic)  Resp 16  Ht 5' 10.5\" (1.791 m)  Wt 186 lb (84.4 kg)  SpO2 97%  BMI 26.31 kg/m2  "

## 2018-10-15 NOTE — PROGRESS NOTES
"  SUBJECTIVE:  CC: Kenney Mcgowan is an 58 year old man who presents for Wellness Check at [facility].     Review of Healthy Lifestyle:    Do you get at least three servings of calcium containing foods daily (dairy, green leafy vegetables, etc.)? yes     Do you have a high-fiber diet? no     Amount of exercise or daily activities, outside of work: 4 day(s) per week for 45-60 min    Do you wear sunscreen on a regular basis? Yes      Are you taking your medications regularly not applicable    Have you had an eye exam in the past two years? no    Do you see a dentist twice per year? yes    Do you have sleep apnea, excessive snoring or excessive daytime drowsiness? no    Do you use tobacco in any form? no       OBJECTIVE:    Vitals: /84 (BP Location: Right arm, Patient Position: Sitting, Cuff Size: Adult Regular)  Pulse 77  Temp 96.4  F (35.8  C) (Tympanic)  Resp 16  Ht 5' 10.5\" (1.791 m)  Wt 186 lb (84.4 kg)  SpO2 97%  BMI 26.31 kg/m2  BMI= Body mass index is 26.31 kg/(m^2).    HEARING: :  Testing not done; patient declined    VISION:  Testing not done; patient declined    Medication Reconciliation: complete    ASSESSMENT/PLAN: Referred to PCP for elevated BP. Patient declines fasting labs today.    COUNSELING:      reports that he has never smoked. He has quit using smokeless tobacco. His smokeless tobacco use included Chew.    Estimated body mass index is 26.31 kg/(m^2) as calculated from the following:    Height as of this encounter: 5' 10.5\" (1.791 m).    Weight as of this encounter: 186 lb (84.4 kg).   Weight management plan: Discussed healthy diet and exercise guidelines and patient will follow up in 12 months in clinic to re-evaluate.    Counseling Resources  [employer]  [email or web address]    DERICK [facility] SCHOOL PROVIDER  [facility]     "

## 2018-10-15 NOTE — MR AVS SNAPSHOT
After Visit Summary   10/15/2018    Kenney Mcgowan    MRN: [id number]           Patient Information     Date Of Birth          1960        Visit Information        Provider Department      10/15/2018 3:00 PM Provider, [facility]        Care Instructions                    Kenney Mcgowan has completed a Wellness Check at the [facility] on 10/15/2018.      ____________________________________________  [facility] PROVIDER                                                                               Wellness Visit Recommendations:      See your regular primary care health provider every year in order to help stay healthy.    Review health changes.     Review your medicines if your doctor has prescribed any.    Talk to your provider about how often to have your cholesterol checked.    If you are at risk for diabetes, you should have a diabetes test (fasting glucose).    Shots: Get a flu shot each year. Get a tetanus shot every 10 years.     Review with your primary care provider other immunizations that you may need based on your age and/or medical/surgical history    Nutrition:     Eat at least 5 servings of fruits and vegetables each day.    Eat whole-grain bread, whole-wheat pasta and brown rice instead of white grains and rice.    Preventive Care to be reviewed by your primary care provider:    Females:        Cervical Cancer Screening                          Breast Cancer Screening                          Colon Cancer Screening  Males:             Prostrate Cancer Screening                          Colon Cancer Screening      Lifestyle:    Exercise at least 150 minutes a week (30 minutes a day, 5 days of the week). This will help you control your weight and help prevent disease or manage disease.    Limit alcohol to one drink per day or less depending on your past medical history.    No smoking.     Wear sunscreen to prevent skin cancer.    See  "your dentist every six months for an exam and cleaning.    Today's Vital Signs:  /84 (BP Location: Right arm, Patient Position: Sitting, Cuff Size: Adult Regular)  Pulse 77  Temp 96.4  F (35.8  C) (Tympanic)  Resp 16  Ht 5' 10.5\" (1.791 m)  Wt 186 lb (84.4 kg)  SpO2 97%  BMI 26.31 kg/m2          Follow-ups after your visit        Who to contact     If you have questions or need follow up information about today's clinic visit or your schedule please contact [facility] directly at [phone].  Normal or non-critical lab and imaging results will be communicated to you by MyChart, letter or phone within 4 business days after the clinic has received the results. If you do not hear from us within 7 days, please contact the clinic through As Seen [employer] or phone. If you have a critical or abnormal lab result, we will notify you by phone as soon as possible.  Submit refill requests through [employer] or call your pharmacy and they will forward the refill request to us. Please allow 3 business days for your refill to be completed.          Additional Information About Your Visit        MyChart Information     [employer] lets you send messages to your doctor, view your test results, renew your prescriptions, schedule appointments and more. To sign up, go to [email or web address] . Click on \"Log in\" on the left side of the screen, which will take you to the Welcome page. Then click on \"Sign up Now\" on the right side of the page.     You will be asked to enter the access code listed below, as well as some personal information. Please follow the directions to create your username and password.     Your access code is: [email or web address]  Expires: 2019  3:08 PM     Your access code will  in 90 days. If you need help or a new code, please call your [facility] or [phone].        Care EveryWhere ID     This is your Care EveryWhere ID. This could be used by other organizations to access " "your [place] medical records  [id number]        Your Vitals Were     Pulse Temperature Respirations Height Pulse Oximetry BMI (Body Mass Index)    77 96.4  F (35.8  C) (Tympanic) 16 5' 10.5\" (1.791 m) 97% 26.31 kg/m2       Blood Pressure from Last 3 Encounters:   10/15/18 150/84   09/25/18 145/85   07/06/18 136/82    Weight from Last 3 Encounters:   10/15/18 186 lb (84.4 kg)   07/06/18 184 lb (83.5 kg)   03/21/18 180 lb (81.6 kg)              Today, you had the following     No orders found for display       Primary Care Provider Office Phone # Fax #    Rakan Laureano [phone] [address] [facility] [address]        Equal Access to Services     [facility] CenterISRA : Hadii ziggy dillard hadasho Soarunali, waaxda luqadaha, qaybta kaalmada adeegyada, albert hernandez . So [facility] [phone].    ATENCIÓN: Si habla español, tiene a moran disposición servicios gratuitos de asistencia lingüística. Llame al [phone].    We comply with applicable federal civil rights laws and [place] laws. We do not discriminate on the basis of race, color, national origin, age, disability, sex, sexual orientation, or gender identity.            Thank you!     Thank you for choosing [place] [address]  for your care. Our goal is always to provide you with excellent care. Hearing back from our patients is one way we can continue to improve our services. Please take a few minutes to complete the written survey that you may receive in the mail after your visit with us. Thank you!             Your Updated Medication List - Protect others around you: Learn how to safely use, store and throw away your medicines at www.disposemymeds.org.          This list is accurate as of 10/15/18  3:08 PM.  Always use your most recent med list.                   Brand Name Dispense Instructions for use Diagnosis    albuterol 108 (90 Base) MCG/ACT inhaler    PROAIR HFA/PROVENTIL HFA/VENTOLIN " HFA    1 Inhaler    Inhale 2 puffs into the lungs every 4 hours as needed for shortness of breath / dyspnea or wheezing    Mild persistent asthma without complication       budesonide-formoterol 80-4.5 MCG/ACT Inhaler    SYMBICORT    1 Inhaler    Inhale 2 puffs into the lungs 2 times daily    Mild persistent asthma without complication       clobetasol 0.05 % cream    TEMOVATE    60 g    Apply sparingly to affected area on hands twice daily as needed.  Do not apply to face.    Dyshidrotic eczema

## 2018-11-21 ENCOUNTER — TELEPHONE (OUTPATIENT)
Dept: FAMILY MEDICINE | Facility: CLINIC | Age: 58
End: 2018-11-21

## 2018-11-21 NOTE — TELEPHONE ENCOUNTER
Panel Management Review        Composite cancer screening  Chart review shows that this patient is due/due soon for the following Colonoscopy  Summary:    Patient is due/failing the following:   COLONOSCOPY    Action needed:   Patient needs referral/order: for colonoscopy vs.FIT    Type of outreach:    Sent letter.    Questions for provider review:    None                                                                                                                                    AJIT PRIEST

## 2018-11-21 NOTE — LETTER
[facility]  [address] [address] [address]       Dear Kenney,    Marci Ybarra, SARAHI has been reviewing your chart.  It appears that there are aspects of your care that could be improved.   At this time you are due for a colonoscopy.    Colon Cancer Screening- Recommended every 5-10 years, depending on your history, in order to prevent and detect colon cancer at its earliest stages.  Colon cancer is now the second leading cause of death in the [place] for both men and women and there are over 130,000 new cases and 50,000 deaths per year from colon cancer.  Colonoscopies can prevent 90-95% of these deaths.  Problem lesions can be removed before they ever become cancer.  This test is not only looking for cancer, but also getting rid of precancerous lesions.  You are usually given some sedation which makes the test very comfortable for most people.     If you do not wish to do a colonoscopy or cannot afford to do one, at this time, there is another option.  It is called a Fit test or Fecal Immunochemical Occult Blood Test (take home stool sample kit).  It does not replace the colonoscopy for colorectal cancer screening, but it can detect hidden bleeding in the lower colon.  It does need to be repeated every year and if a positive result is obtained, you would be referred for a colonoscopy.  The FIT test is really easy to do and does not require any diet or medication restrictions and involves only one collection sample.     If you are under/uninsured, we recommend you contact the [place] Program- They provide free/reduced fee screenings to eligible patients based on family size and income.    [place]- [place]'s cancer screening program;  Toll free [phone]-/  [phone]  [employer]    If you have completed either one of these tests or had a flexible sigmoidoscopy in the past five years at another facility, please let us know so that we can update your records.  Please call us ([phone])  if you have questions or would like to arrange either to do a colonoscopy or obtain the necessary test kit for the Fecal Occult Test.           Marci Ybarra NP/  rhoda

## 2019-03-26 ENCOUNTER — TELEPHONE (OUTPATIENT)
Dept: FAMILY MEDICINE | Facility: CLINIC | Age: 59
End: 2019-03-26

## 2019-03-26 ENCOUNTER — ALLIED HEALTH/NURSE VISIT (OUTPATIENT)
Dept: FAMILY MEDICINE | Facility: CLINIC | Age: 59
End: 2019-03-26
Payer: COMMERCIAL

## 2019-03-26 VITALS — SYSTOLIC BLOOD PRESSURE: 142 MMHG | DIASTOLIC BLOOD PRESSURE: 88 MMHG

## 2019-03-26 DIAGNOSIS — Z01.30 BP CHECK: Primary | ICD-10-CM

## 2019-03-26 PROCEDURE — 99207 ZZC NO CHARGE NURSE ONLY: CPT

## 2019-03-26 NOTE — PROGRESS NOTES
"S:  Nurse visit, walk in.    B:  Last office visit: 10/15/18  From 10/15/18 Office Visit:  \"ASSESSMENT/PLAN: Referred to PCP for elevated BP.\"    Patient is not currently on any blood pressure medications.    Patient stated he just wanted to have his blood pressure checked this morning.    Patient reports /110 this morning ( a few hours ago) from home monitor    A:  Vital Signs 3/26/2019 3/26/2019   Systolic 144 142   Diastolic 86 88   Pulse     Temperature     Respirations     Weight (LB)     Height     BMI (Calculated)     O2       Patient denies any subjective s/sx of HTN this morning or now.  Patient does not have objective s/sx of HTN this morning or now.    R:  Writer provided education on how to use BP monitor at home.    Patient states that he did not read the instructions with his home BP monitor and he does believe that the reading might have been user error.   If patient continues to have high readings on home monitor patient will come back in for re evaluation and will bring home monitor with to check against manual cuff in clinic.    ED precautions r/t s/sx of htn and stroke discussed, patient stated understanding.    Routed to Dr Laureano as EDITH.    No further action necessary.  Encounter closed.    German Flores RN          "

## 2019-03-29 NOTE — TELEPHONE ENCOUNTER
CSS: ok to deliver message from Dr. Laureano.     Left message for patient to return call to clinic.   Marie Bishop, CHANDLERN, RN

## 2019-03-29 NOTE — TELEPHONE ENCOUNTER
Appears that patient still has hypertension. I would recommend a visit to talk about possibly starting patient on medication

## 2019-05-09 ENCOUNTER — TELEPHONE (OUTPATIENT)
Dept: FAMILY MEDICINE | Facility: CLINIC | Age: 59
End: 2019-05-09

## 2019-05-09 NOTE — LETTER
[facility]  [address] [facility] [address]      Dear Kenney,    Marci Ybarra, SARAHI has been reviewing your chart.  It appears that there are aspects of your care that could be improved.   At this time you are due for a colonoscopy.    Colon Cancer Screening- Recommended every 5-10 years, depending on your history, in order to prevent and detect colon cancer at its earliest stages.  Colon cancer is now the second leading cause of death in the [place] for both men and women and there are over 130,000 new cases and 50,000 deaths per year from colon cancer.  Colonoscopies can prevent 90-95% of these deaths.  Problem lesions can be removed before they ever become cancer.  This test is not only looking for cancer, but also getting rid of precancerous lesions.  You are usually given some sedation which makes the test very comfortable for most people.     If you do not wish to do a colonoscopy or cannot afford to do one, at this time, there is another option.  It is called a Fit test or Fecal Immunochemical Occult Blood Test (take home stool sample kit).  It does not replace the colonoscopy for colorectal cancer screening, but it can detect hidden bleeding in the lower colon.  It does need to be repeated every year and if a positive result is obtained, you would be referred for a colonoscopy.  The FIT test is really easy to do and does not require any diet or medication restrictions and involves only one collection sample.     If you are under/uninsured, we recommend you contact the [facility]- They provide free/reduced fee screenings to eligible patients based on family size and income.    [place]- [place]'s cancer screening program;  Toll free 1-595.980.3204-/  [phone]  [employer]    If you have completed either one of these tests or had a flexible sigmoidoscopy in the past five years at another facility, please let us know so that we can update your records.  Please call us ([phone])  if you have questions or would like to arrange either to do a colonoscopy or obtain the necessary test kit for the Fecal Occult Test.

## 2019-05-09 NOTE — TELEPHONE ENCOUNTER
Panel Management Review            Composite cancer screening  Chart review shows that this patient is due/due soon for the following Colonoscopy  Summary:    Patient is due/failing the following:   COLONOSCOPY    Action needed:   Patient needs referral/order: for colonoscopy vs. FIT    Type of outreach:    Sent letter.    Questions for provider review:    None                                                                                                                                    AJIT PRIEST

## 2019-08-20 ENCOUNTER — OFFICE VISIT (OUTPATIENT)
Dept: FAMILY MEDICINE | Facility: CLINIC | Age: 59
End: 2019-08-20
Payer: COMMERCIAL

## 2019-08-20 VITALS
HEART RATE: 81 BPM | DIASTOLIC BLOOD PRESSURE: 82 MMHG | OXYGEN SATURATION: 96 % | SYSTOLIC BLOOD PRESSURE: 148 MMHG | BODY MASS INDEX: 24.48 KG/M2 | TEMPERATURE: 97.4 F | RESPIRATION RATE: 14 BRPM | HEIGHT: 70 IN | WEIGHT: 171 LBS

## 2019-08-20 DIAGNOSIS — Z12.11 SCREEN FOR COLON CANCER: ICD-10-CM

## 2019-08-20 DIAGNOSIS — R03.0 ELEVATED BLOOD PRESSURE READING WITHOUT DIAGNOSIS OF HYPERTENSION: Primary | ICD-10-CM

## 2019-08-20 PROCEDURE — 99213 OFFICE O/P EST LOW 20 MIN: CPT | Performed by: FAMILY MEDICINE

## 2019-08-20 ASSESSMENT — MIFFLIN-ST. JEOR: SCORE: 1610.87

## 2019-08-20 ASSESSMENT — ASTHMA QUESTIONNAIRES: ACUTE_EXACERBATION_TODAY: NO

## 2019-08-20 NOTE — PATIENT INSTRUCTIONS
Thank you for choosing [facility].  You may be receiving an email and/or telephone survey request from [facility] Customer Experience regarding your visit today.  Please take a few minutes to respond to the survey to let us know how we are doing.      If you have questions or concerns, please contact us via [employer] or you can contact your care team at [phone].    Our Clinic hours are:  Monday 6:40 am  to 7:00 pm  Tuesday -Friday 6:40 am to 5:00 pm    The [place] outpatient lab hours are:  Monday - Friday 6:10 am to 4:45 pm  Saturdays 7:00 am to 11:00 am  Appointments are required, call [phone]    If you have clinical questions after hours or would like to schedule an appointment,  call the clinic at [phone].

## 2019-08-20 NOTE — PROGRESS NOTES
Subjective     Kenney Mcgowan is a 59 year old male who presents to clinic today for the following health issues:  59yr old male here for colon cancer screening wants the FIT screen.Patient also has had elevated blood pressure for a couple of months. He blames the blood pressure elevation  on stress. Patient is made aware of dangers of untreated hypertension. He promises to recheck in a couple of weeks . He is not interested in medication at this time.     HPI   Chief Complaint   Patient presents with     discuss fit test or colagaurd     patient had colagaurd test done at age 52            Patient Active Problem List   Diagnosis     Enthesopathy     Nonallopathic lesion of lower extremities     CARDIOVASCULAR SCREENING; LDL GOAL LESS THAN 130     Mild persistent asthma without complication     History reviewed. No pertinent surgical history.    Social History     Tobacco Use     Smoking status: Never Smoker     Smokeless tobacco: Former User     Types: Chew   Substance Use Topics     Alcohol use: Yes     Comment: occassionally     Family History   Problem Relation Age of Onset     Diabetes Father      Heart Disease Father      Diabetes Sister      Heart Disease Mother          Current Outpatient Medications   Medication Sig Dispense Refill     albuterol (PROAIR HFA/PROVENTIL HFA/VENTOLIN HFA) 108 (90 Base) MCG/ACT Inhaler Inhale 2 puffs into the lungs every 4 hours as needed for shortness of breath / dyspnea or wheezing (Patient not taking: Reported on 8/20/2019) 1 Inhaler 11     budesonide-formoterol (SYMBICORT) 80-4.5 MCG/ACT Inhaler Inhale 2 puffs into the lungs 2 times daily (Patient not taking: Reported on 8/20/2019) 1 Inhaler 11     clobetasol (TEMOVATE) 0.05 % cream Apply sparingly to affected area on hands twice daily as needed.  Do not apply to face. (Patient not taking: Reported on 8/20/2019) 60 g 3     Allergies   Allergen Reactions     Cat Hair Extract      BP Readings from Last 3 Encounters:   08/20/19 (!)  "148/82   03/26/19 142/88   10/15/18 150/84    Wt Readings from Last 3 Encounters:   08/20/19 77.6 kg (171 lb)   10/15/18 84.4 kg (186 lb)   07/06/18 83.5 kg (184 lb)                    Elevated blood pressure  Follow-up      Do you check your blood pressure regularly outside of the clinic? Yes     Are you following a low salt diet? Yes    Are your blood pressures ever more than 140 on the top number (systolic) OR more   than 90 on the bottom number (diastolic), for example 140/90? No  Reviewed and updated as needed this visit by Provider  Tobacco  Allergies  Meds  Problems  Med Hx  Surg Hx  Fam Hx         Review of Systems   ROS COMP: Constitutional, HEENT, cardiovascular, pulmonary, gi and gu systems are negative, except as otherwise noted.      Objective    BP (!) 148/82   Pulse 81   Temp 97.4  F (36.3  C) (Tympanic)   Resp 14   Ht 1.784 m (5' 10.25\")   Wt 77.6 kg (171 lb)   SpO2 96%   BMI 24.36 kg/m    Body mass index is 24.36 kg/m .  Physical Exam   GENERAL: healthy, alert and no distress  EYES: Eyes grossly normal to inspection, PERRL and conjunctivae and sclerae normal  HENT: ear canals and TM's normal, nose and mouth without ulcers or lesions  NECK: no adenopathy, no asymmetry, masses, or scars and thyroid normal to palpation  RESP: lungs clear to auscultation - no rales, rhonchi or wheezes  CV: regular rate and rhythm, normal S1 S2, no S3 or S4, no murmur, click or rub, no peripheral edema and peripheral pulses strong  ABDOMEN: soft, nontender, no hepatosplenomegaly, no masses and bowel sounds normal  MS: no gross musculoskeletal defects noted, no edema    Diagnostic Test Results:  none         Assessment & Plan     1. Elevated blood pressure reading without diagnosis of hypertension  Patient is cautioned about his blood pressure. He is not interested in medication. He will come back for a nurse only visit in a couple of weeks for a recheck of his blood pressure.    2. Screen for colon " cancer  .Patient will be notified of results  - Fecal colorectal cancer screen (FIT); Future       FUTURE APPOINTMENTS:       - Follow-up visit in one month or sooner as needed    Return in about 2 weeks (around 9/3/2019) for BP Recheck.    Rakan Laureano MD  [facility]

## 2019-08-21 DIAGNOSIS — Z12.11 SCREEN FOR COLON CANCER: ICD-10-CM

## 2019-08-21 PROCEDURE — 82274 ASSAY TEST FOR BLOOD FECAL: CPT | Performed by: FAMILY MEDICINE

## 2019-08-21 ASSESSMENT — ASTHMA QUESTIONNAIRES: ACT_TOTALSCORE: 25

## 2019-08-21 NOTE — LETTER
[facility]  [address]  [place] [address]  Phone: [phone]      8/28/2019     Kenney Mcgowan  [address] [facility] [address]      Dear Kenney:    Thank you for allowing me to participate in your care. Your recent test results were reviewed and listed below.      Please inform patient that test result ( FIT Screen )  was within normal parameters.   Thank you.     Rakan Laureano M.D.     Results for orders placed or performed in visit on 08/21/19   Fecal colorectal cancer screen (FIT)   Result Value Ref Range    Occult Blood Scn FIT Negative NEG^Negative           Thank you for choosing [facility]. As a result, please continue with the treatment plan discussed in the office. Return as discussed or sooner if symptoms worsen or fail to improve.     If you have any further questions or concerns, please do not hesitate to contact us.      Sincerely,    DR Laureano

## 2019-08-26 LAB — HEMOCCULT STL QL IA: NEGATIVE

## 2019-08-28 NOTE — RESULT ENCOUNTER NOTE
Please inform patient that test result ( FIT Screen )  was within normal parameters.   Thank you.     Rakan Laureano M.D.

## 2019-09-09 ENCOUNTER — OFFICE VISIT (OUTPATIENT)
Dept: LAB | Facility: SCHOOL | Age: 59
End: 2019-09-09
Payer: COMMERCIAL

## 2019-09-09 VITALS
RESPIRATION RATE: 15 BRPM | TEMPERATURE: 97.2 F | DIASTOLIC BLOOD PRESSURE: 86 MMHG | BODY MASS INDEX: 24.06 KG/M2 | WEIGHT: 177.6 LBS | SYSTOLIC BLOOD PRESSURE: 134 MMHG | OXYGEN SATURATION: 98 % | HEART RATE: 73 BPM | HEIGHT: 72 IN

## 2019-09-09 DIAGNOSIS — Z00.00 WELLNESS EXAMINATION: Primary | ICD-10-CM

## 2019-09-09 PROCEDURE — 99213 OFFICE O/P EST LOW 20 MIN: CPT

## 2019-09-09 ASSESSMENT — MIFFLIN-ST. JEOR: SCORE: 1660.65

## 2019-09-09 NOTE — PATIENT INSTRUCTIONS
"                Kenney Mcgowan has completed a Wellness Check at the [facility] on 9/9/2019.      ____________________________________________  [place] [employer] PROVIDER                                                                               Wellness Visit Recommendations:      See your regular primary care health provider every year in order to help stay healthy.    Review health changes.     Review your medicines if your doctor has prescribed any.    Talk to your provider about how often to have your cholesterol checked.    If you are at risk for diabetes, you should have a diabetes test (fasting glucose).    Shots: Get a flu shot each year. Get a tetanus shot every 10 years.     Review with your primary care provider other immunizations that you may need based on your age and/or medical/surgical history    Nutrition:     Eat at least 5 servings of fruits and vegetables each day.    Eat whole-grain bread, whole-wheat pasta and brown rice instead of white grains and rice.    Preventive Care to be reviewed by your primary care provider:    Females:        Cervical Cancer Screening                          Breast Cancer Screening                          Colon Cancer Screening  Males:             Prostrate Cancer Screening                          Colon Cancer Screening      Lifestyle:    Exercise at least 150 minutes a week (30 minutes a day, 5 days of the week). This will help you control your weight and help prevent disease or manage disease.    Limit alcohol to one drink per day or less depending on your past medical history.    No smoking.     Wear sunscreen to prevent skin cancer.    See your dentist every six months for an exam and cleaning.    Today's Vital Signs:  BP (!) 150/80   Pulse 73   Temp 97.2  F (36.2  C) (Tympanic)   Resp 15   Ht 1.816 m (5' 11.5\")   Wt 80.6 kg (177 lb 9.6 oz)   SpO2 98%   BMI 24.42 kg/m    "

## 2019-09-09 NOTE — PROGRESS NOTES
"SUBJECTIVE:  CC: Kenney Mcgowan is an 59 year old woman who presents for Wellness Check at [id number].     Review of Healthy Lifestyle:    Do you get at least three servings of calcium containing foods daily (dairy, green leafy vegetables, etc.)? yes     Do you have a high-fiber diet? yes     Amount of exercise or daily activities, outside of work: 3-4 day(s) per week    Do you wear sunscreen on a regular basis? Yes when in the sun    Are you taking your medications regularly not applicable    Have you had an eye exam in the past two years? yes    Do you see a dentist twice per year? yes    Do you have sleep apnea, excessive snoring or excessive daytime drowsiness? no    Do you use tobacco in any form? no       OBJECTIVE:    Vitals: /86   Pulse 73   Temp 97.2  F (36.2  C) (Tympanic)   Resp 15   Ht 1.816 m (5' 11.5\")   Wt 80.6 kg (177 lb 9.6 oz)   SpO2 98%   BMI 24.42 kg/m    BMI= Body mass index is 24.42 kg/m .    HEARING: Right Ear:        500Hz:  RESPONSE- on Level: 20db   1000 Hz: RESPONSE- on Level: 40 db   2000 Hz: RESPONSE- on Level: 20 db   4000 Hz: RESPONSE- on Level: 40 db    Left Ear:       500Hz:  RESPONSE- on Level: 40 db   1000 Hz: RESPONSE- on Level: 40 db   2000 Hz: RESPONSE- on Level:  20 db    4000 Hz: RESPONSE- on Level: 40 db    VISION:    Corrective lenses?  Yes, has had an eye appointment within last year      Medication Reconciliation: complete    ASSESSMENT/PLAN:  Patient is here today for wellness examination.  Patient was given information on recommendations for health, preventative care, diet and lifestyle changes for her health.  Recommended low salt and exercise for BP and recheck with nursing occasionally and if remaining high (over 140/90 consistently) to follow-up with PCP for evaluation on BP.  Note sent to his PCP about his BP today.    COUNSELING:      reports that he has never smoked. He has quit using smokeless tobacco. His smokeless " "tobacco use included chew.    Estimated body mass index is 24.42 kg/m  as calculated from the following:    Height as of this encounter: 1.816 m (5' 11.5\").    Weight as of this encounter: 80.6 kg (177 lb 9.6 oz).       Charlotte Lea NP on 9/9/2019 at 3:15 PM  [facility]     "

## 2020-06-09 ENCOUNTER — VIRTUAL VISIT (OUTPATIENT)
Dept: FAMILY MEDICINE | Facility: CLINIC | Age: 60
End: 2020-06-09
Payer: COMMERCIAL

## 2020-06-09 VITALS
HEART RATE: 79 BPM | BODY MASS INDEX: 23.79 KG/M2 | SYSTOLIC BLOOD PRESSURE: 169 MMHG | DIASTOLIC BLOOD PRESSURE: 100 MMHG | WEIGHT: 173 LBS

## 2020-06-09 DIAGNOSIS — J45.30 MILD PERSISTENT ASTHMA WITHOUT COMPLICATION: ICD-10-CM

## 2020-06-09 DIAGNOSIS — Z83.3 FAMILY HISTORY OF DIABETES MELLITUS: ICD-10-CM

## 2020-06-09 DIAGNOSIS — Z83.438 FAMILY HISTORY OF HYPERLIPIDEMIA: ICD-10-CM

## 2020-06-09 DIAGNOSIS — I10 BENIGN ESSENTIAL HYPERTENSION: Primary | ICD-10-CM

## 2020-06-09 PROCEDURE — 99214 OFFICE O/P EST MOD 30 MIN: CPT | Mod: GT | Performed by: FAMILY MEDICINE

## 2020-06-09 RX ORDER — LISINOPRIL 5 MG/1
5 TABLET ORAL DAILY
Qty: 30 TABLET | Refills: 1 | Status: SHIPPED | OUTPATIENT
Start: 2020-06-09 | End: 2020-11-04

## 2020-06-09 NOTE — PROGRESS NOTES
"Kenney Mcgowan is a 60 year old male who is being evaluated via a billable video visit.      The patient has been notified of following:     \"This video visit will be conducted via a call between you and your physician/provider. We have found that certain health care needs can be provided without the need for an in-person physical exam.  This service lets us provide the care you need with a video conversation.  If a prescription is necessary we can send it directly to your pharmacy.  If lab work is needed we can place an order for that and you can then stop by our lab to have the test done at a later time.    Video visits are billed at different rates depending on your insurance coverage.  Please reach out to your insurance provider with any questions.    If during the course of the call the physician/provider feels a video visit is not appropriate, you will not be charged for this service.\"    Patient has given verbal consent for Video visit? Yes    How would you like to obtain your AVS? Mail a copy    Patient would like the video invitation sent by: Send to e-mail at: [email or web address]    Will anyone else be joining your video visit? No      Subjective     Kenney Mcgowan is a 60 year old male who presents today via video visit for the following health issues:    HPI  Hypertension Follow-up      Do you check your blood pressure regularly outside of the clinic? Yes     Are you following a low salt diet? No    Are your blood pressures ever more than 140 on the top number (systolic) OR more   than 90 on the bottom number (diastolic), for example 140/90? Yes      How many servings of fruits and vegetables do you eat daily?  4 or more    On average, how many sweetened beverages do you drink each day (Examples: soda, juice, sweet tea, etc.  Do NOT count diet or artificially sweetened beverages)?   0    How many days per week do you exercise enough to make your heart beat faster? 5    How many minutes a day do you exercise " "enough to make your heart beat faster? 30 - 60    How many days per week do you miss taking your medication? 0    Ribs      Duration: one month    Description (location/character/radiation): middle of chest and across    Intensity:  mild, moderate    Accompanying signs and symptoms: patient was trying to lift  Heavy object    History (similar episodes/previous evaluation): None    Precipitating or alleviating factors: None    Therapies tried and outcome: None          Video Start Time: 10:36 AM    Has noted Blood pressure > 140/90 consistently , in 2020 -home  Blood pressure apparatus      PROBLEMS TO ADD ON...hx of asthma  Does not need Symbicort  Reports asthma well controlled      BP Readings from Last 3 Encounters:   06/10/20 (!) 147/96   06/09/20 (!) 169/100   09/09/19 134/86    Wt Readings from Last 3 Encounters:   06/09/20 78.5 kg (173 lb)   09/09/19 80.6 kg (177 lb 9.6 oz)   08/20/19 77.6 kg (171 lb)                    Reviewed and updated as needed this visit by Provider  Tobacco  Meds         Review of Systems   Constitutional, HEENT, cardiovascular, pulmonary, GI, , musculoskeletal, neuro, skin, endocrine and psych systems are negative, except as otherwise noted.      Objective    BP (!) 169/100 (BP Location: Left arm, Patient Position: Sitting, Cuff Size: Adult Regular)   Pulse 79   Wt 78.5 kg (173 lb)   BMI 23.79 kg/m    Estimated body mass index is 23.79 kg/m  as calculated from the following:    Height as of 9/9/19: 1.816 m (5' 11.5\").    Weight as of this encounter: 78.5 kg (173 lb).  Physical Exam     GENERAL: Healthy, alert and no distress  EYES: Eyes grossly normal to inspection.  No discharge or erythema, or obvious scleral/conjunctival abnormalities.  RESP: No audible wheeze, cough, or visible cyanosis.  No visible retractions or increased work of breathing.    SKIN: Visible skin clear. No significant rash, abnormal pigmentation or lesions.  NEURO: Cranial nerves grossly intact.  " Mentation and speech appropriate for age.  PSYCH: Mentation appears normal, affect normal/bright, judgement and insight intact, normal speech and appearance well-groomed.      Diagnostic Test Results:  Labs reviewed in Epic  none         Assessment & Plan     1. Benign essential hypertension  New diagnoses   Start   - lisinopril (ZESTRIL) 5 MG tablet; Take 1 tablet (5 mg) by mouth daily  Dispense: 30 tablet; Refill: 1  - Comprehensive metabolic panel (BMP + Alb, Alk Phos, ALT, AST, Total. Bili, TP); Future  - follow up for Blood pressure recheck in 1 month.    2. Mild intermitent asthma without complication  Patient reports not taking/need symbicort > 2 yrs  Stable asthma on albuterol as needed      3. Family history of diabetes mellitus    - Hemoglobin A1c; Future    4. Family history of hyperlipidemia    - Lipid panel reflex to direct LDL Fasting; Future   The 10-year ASCVD risk score (Joss CALLE Jr., et al., 2013) is: 6.8%    Values used to calculate the score:      Age: 60 years      Sex: Male      Is Non- : No      Diabetic: No      Tobacco smoker: No      Systolic Blood Pressure: 147 mmHg      Is BP treated: Yes      HDL Cholesterol: 89 mg/dL      Total Cholesterol: 198 mg/dL          Return in about 1 day (around 6/10/2020) for BP Recheck/ HTN, Lab follow up.    Luisa Fonseca MD  [facility]      Video-Visit Details    Type of service:  Video Visit    Video End Time:10:55 AM    Originating Location (pt. Location): Home    Distant Location (provider location):  [facility]     Platform used for Video Visit: RonanWell    Return in about 1 day (around 6/10/2020) for BP Recheck/ HTN, Lab follow up.       Luisa Fonseca MD

## 2020-06-10 ENCOUNTER — ALLIED HEALTH/NURSE VISIT (OUTPATIENT)
Dept: NURSING | Facility: CLINIC | Age: 60
End: 2020-06-10
Payer: COMMERCIAL

## 2020-06-10 VITALS — DIASTOLIC BLOOD PRESSURE: 96 MMHG | SYSTOLIC BLOOD PRESSURE: 147 MMHG

## 2020-06-10 DIAGNOSIS — Z83.438 FAMILY HISTORY OF HYPERLIPIDEMIA: ICD-10-CM

## 2020-06-10 DIAGNOSIS — Z83.3 FAMILY HISTORY OF DIABETES MELLITUS: ICD-10-CM

## 2020-06-10 DIAGNOSIS — I10 BENIGN ESSENTIAL HYPERTENSION: ICD-10-CM

## 2020-06-10 DIAGNOSIS — Z01.30 BP CHECK: Primary | ICD-10-CM

## 2020-06-10 LAB
ALBUMIN SERPL-MCNC: 4 G/DL (ref 3.4–5)
ALP SERPL-CCNC: 60 U/L (ref 40–150)
ALT SERPL W P-5'-P-CCNC: 26 U/L (ref 0–70)
ANION GAP SERPL CALCULATED.3IONS-SCNC: 5 MMOL/L (ref 3–14)
AST SERPL W P-5'-P-CCNC: 16 U/L (ref 0–45)
BILIRUB SERPL-MCNC: 0.6 MG/DL (ref 0.2–1.3)
BUN SERPL-MCNC: 17 MG/DL (ref 7–30)
CALCIUM SERPL-MCNC: 8.5 MG/DL (ref 8.5–10.1)
CHLORIDE SERPL-SCNC: 109 MMOL/L (ref 94–109)
CHOLEST SERPL-MCNC: 198 MG/DL
CO2 SERPL-SCNC: 26 MMOL/L (ref 20–32)
CREAT SERPL-MCNC: 0.86 MG/DL (ref 0.66–1.25)
GFR SERPL CREATININE-BSD FRML MDRD: >90 ML/MIN/{1.73_M2}
GLUCOSE SERPL-MCNC: 87 MG/DL (ref 70–99)
HBA1C MFR BLD: 5.1 % (ref 0–5.6)
HDLC SERPL-MCNC: 89 MG/DL
LDLC SERPL CALC-MCNC: 98 MG/DL
NONHDLC SERPL-MCNC: 109 MG/DL
POTASSIUM SERPL-SCNC: 4 MMOL/L (ref 3.4–5.3)
PROT SERPL-MCNC: 7.5 G/DL (ref 6.8–8.8)
SODIUM SERPL-SCNC: 140 MMOL/L (ref 133–144)
TRIGL SERPL-MCNC: 54 MG/DL

## 2020-06-10 PROCEDURE — 99207 ZZC NO CHARGE NURSE ONLY: CPT

## 2020-06-10 PROCEDURE — 80061 LIPID PANEL: CPT | Performed by: FAMILY MEDICINE

## 2020-06-10 PROCEDURE — 80053 COMPREHEN METABOLIC PANEL: CPT | Performed by: FAMILY MEDICINE

## 2020-06-10 PROCEDURE — 83036 HEMOGLOBIN GLYCOSYLATED A1C: CPT | Performed by: FAMILY MEDICINE

## 2020-06-10 PROCEDURE — 36415 COLL VENOUS BLD VENIPUNCTURE: CPT | Performed by: FAMILY MEDICINE

## 2020-06-10 ASSESSMENT — ASTHMA QUESTIONNAIRES: ACT_TOTALSCORE: 25

## 2020-06-10 NOTE — RESULT ENCOUNTER NOTE
Send lab & letter    Dear Kenney    All blood test look good.  -Cholesterol levels (LDL,HDL, Triglycerides) are ok.  ADVISE: rechecking in 1 year.   -Liver and gallbladder tests are normal (ALT,AST, Alk phos, bilirubin), kidney function is normal (Cr, GFR), sodium is normal, potassium is normal, calcium is normal, glucose is normal.  -A1C (diabetic test) is normal and indicates that your blood sugar has been in a normal range the last 3 months.    Please keep us posted with questions or concerns .      Best Regards,    Luisa Fonseca MD  [facility]  [phone]

## 2020-06-17 PROBLEM — Z83.438 FAMILY HISTORY OF HYPERLIPIDEMIA: Status: ACTIVE | Noted: 2020-06-17

## 2020-06-17 PROBLEM — I10 BENIGN ESSENTIAL HYPERTENSION: Status: ACTIVE | Noted: 2020-06-17

## 2020-06-17 PROBLEM — Z83.3 FAMILY HISTORY OF DIABETES MELLITUS: Status: ACTIVE | Noted: 2020-06-17

## 2020-09-24 ENCOUNTER — TELEPHONE (OUTPATIENT)
Dept: FAMILY MEDICINE | Facility: CLINIC | Age: 60
End: 2020-09-24

## 2020-09-24 NOTE — TELEPHONE ENCOUNTER
Panel Management Review      Composite cancer screening  Chart review shows that this patient is due/due soon for the following Fecal Colorectal (FIT)  Summary:    Patient is due/failing the following:   COLONOSCOPY or FIT and PHYSICAL    Action needed:   Care team to contact to schedule    Type of outreach:    care team    Questions for provider review:    None                                                                                                                                    Olya Murrieta (Lori) CNP        Chart routed to Care Team .

## 2020-11-04 DIAGNOSIS — I10 BENIGN ESSENTIAL HYPERTENSION: ICD-10-CM

## 2020-11-04 NOTE — TELEPHONE ENCOUNTER
Reason for Call:  Medication or medication refill:    Do you use a [facility]?  Name of the pharmacy and phone number for the current request:        [id number] [place] [address] [facility]    Name of the medication requested:    lisinopril (ZESTRIL) 5 MG tablet 30 tablet         Other request:  Pt needs refill this rx    Can we leave a detailed message on this number? YES    Phone number patient can be reached at: Home number on file [phone] (home)    Best Time: any    Call taken on 11/4/2020 at 1:46 PM by Quintin Blair

## 2020-11-04 NOTE — TELEPHONE ENCOUNTER
Lisinopril:    Note from 5/9/2020 virtual visit:  follow up for Blood pressure recheck in 1 month.     Called patient.  He is currently driving and will call back to schedule an appointment.  Abbie Zamora RN

## 2020-11-09 NOTE — TELEPHONE ENCOUNTER
AS,  Left VM for patient  See below messages  Please advise on further refill  Thanks,  Tish BRUNO RN

## 2020-11-10 RX ORDER — LISINOPRIL 5 MG/1
5 TABLET ORAL DAILY
Qty: 30 TABLET | Refills: 0 | Status: SHIPPED | OUTPATIENT
Start: 2020-11-10 | End: 2020-12-08

## 2020-12-07 DIAGNOSIS — I10 BENIGN ESSENTIAL HYPERTENSION: ICD-10-CM

## 2020-12-08 ENCOUNTER — VIRTUAL VISIT (OUTPATIENT)
Dept: FAMILY MEDICINE | Facility: CLINIC | Age: 60
End: 2020-12-08
Payer: COMMERCIAL

## 2020-12-08 DIAGNOSIS — I10 ESSENTIAL HYPERTENSION, BENIGN: Primary | ICD-10-CM

## 2020-12-08 PROCEDURE — 99214 OFFICE O/P EST MOD 30 MIN: CPT | Mod: TEL | Performed by: FAMILY MEDICINE

## 2020-12-08 RX ORDER — ALBUTEROL SULFATE 90 UG/1
AEROSOL, METERED RESPIRATORY (INHALATION)
COMMUNITY
Start: 2020-08-25 | End: 2021-04-13

## 2020-12-08 RX ORDER — LISINOPRIL 10 MG/1
10 TABLET ORAL DAILY
Qty: 30 TABLET | Refills: 1 | Status: SHIPPED | OUTPATIENT
Start: 2020-12-08 | End: 2021-02-11

## 2020-12-08 RX ORDER — LISINOPRIL 5 MG/1
TABLET ORAL
Start: 2020-12-08

## 2020-12-08 NOTE — PROGRESS NOTES
"Kenney Mcgowan is a 60 year old male who is being evaluated via a billable video visit.      The patient has been notified of following:     \"This video visit will be conducted via a call between you and your physician/provider. We have found that certain health care needs can be provided without the need for an in-person physical exam.  This service lets us provide the care you need with a video conversation.  If a prescription is necessary we can send it directly to your pharmacy.  If lab work is needed we can place an order for that and you can then stop by our lab to have the test done at a later time.    Video visits are billed at different rates depending on your insurance coverage.  Please reach out to your insurance provider with any questions.    If during the course of the call the physician/provider feels a video visit is not appropriate, you will not be charged for this service.\"    Patient has given verbal consent for Video visit? Yes  How would you like to obtain your AVS? Mail a copy  If you are dropped from the video visit, the video invite should be resent to: Send to e-mail at: [email or web address]  Will anyone else be joining your video visit? No   :[id number]}    Subjective     Kenney Mcgowan is a 60 year old male who presents today via video visit for the following health issues:    HPI      Pt will take BP reading prior to connecting with Dr. Fonseca.    Hypertension Follow-up      Do you check your blood pressure regularly outside of the clinic? Yes     Are you following a low salt diet? No    Are your blood pressures ever more than 140 on the top number (systolic) OR more   than 90 on the bottom number (diastolic), for example 140/90? Yes      How many servings of fruits and vegetables do you eat daily?  2-3    On average, how many sweetened beverages do you drink each day (Examples: soda, juice, sweet tea, etc.  Do NOT count diet or artificially sweetened beverages)?   0    How many days per week do you " exercise enough to make your heart beat faster? 3 or less    How many minutes a day do you exercise enough to make your heart beat faster? 20 - 29  How many days per week do you miss taking your medication? 1    What makes it hard for you to take your medications?  remembering to take  's systoloic  & diastolic can be 90's  Coffee  Only in am. Does salt food. Does drink a couple of beers a day.  Very active life style with Excercise 3-4/week - he [employer], cross country.  Had Close contact for COVID- has sent spit test sent&  waiting for results.  No symptoms at all. Over all in usual state of good health    Review of Systems   Constitutional, HEENT, cardiovascular, pulmonary, GI, , musculoskeletal, neuro, skin, endocrine and psych systems are negative, except as otherwise noted.      Objective           Vitals:  No vitals were obtained today due to virtual visit.    Physical Exam   Limited exam- because of telephone only visit.  Clear speech and memory. Normal affect.  RESP: No audible wheeze, cough.            Assessment & Plan     Kenney was seen today for hypertension.    Diagnoses and all orders for this visit:    Essential hypertension, benign  Comments:  Diagnosed since June 2020.  Has been on lisnoprl 5 mg- no side effects  Advised - Lisinopril increased to 10 mg once daily & virtual follow up in 10-14 days    Orders:  -     lisinopril (ZESTRIL) 10 MG tablet; Take 1 tablet (10 mg) by mouth daily  Potential medication side effects were discussed with the patient; let me know if any occur.            Appointment was changed to telephone only appointment for about 10 mins, as he was not able to connect/ PW of his my chart      Return in about 9 days (around 12/17/2020).    Luisa Fonseca MD  [facility]      Telephone only appointment for 10 mins

## 2020-12-08 NOTE — TELEPHONE ENCOUNTER
Lisinopril:    One kerri supply sent 11/10/2020    Note from 11/4 request:    Note from 5/9/2020 virtual visit:  follow up for Blood pressure recheck in 1 month.     Called patient.  He is currently driving and will call back to schedule an appointment.  Abbie Zamora RN    Note from PCP:  Please inform patient to make appointment , virtual video or in persons with primary care physicain , as unable to refill further     Patient did not schedule, No response to past outreach by triage.    Spoke with patient.  Scheduled him for virtual video visit with A.S. today at 5:00 pm  Abbie Zamora RN

## 2020-12-17 ENCOUNTER — VIRTUAL VISIT (OUTPATIENT)
Dept: FAMILY MEDICINE | Facility: CLINIC | Age: 60
End: 2020-12-17
Payer: COMMERCIAL

## 2020-12-17 DIAGNOSIS — I10 ESSENTIAL HYPERTENSION, BENIGN: ICD-10-CM

## 2020-12-17 PROCEDURE — 99213 OFFICE O/P EST LOW 20 MIN: CPT | Mod: TEL | Performed by: FAMILY MEDICINE

## 2020-12-17 RX ORDER — LISINOPRIL 10 MG/1
10 TABLET ORAL DAILY
Qty: 30 TABLET | Refills: 1 | Status: CANCELLED | OUTPATIENT
Start: 2020-12-17

## 2020-12-17 NOTE — PROGRESS NOTES
"Kenney Mcgowan is a 60 year old male who is being evaluated via a billable video visit.      The patient has been notified of following:     \"This video visit will be conducted via a call between you and your physician/provider. We have found that certain health care needs can be provided without the need for an in-person physical exam.  This service lets us provide the care you need with a video conversation.  If a prescription is necessary we can send it directly to your pharmacy.  If lab work is needed we can place an order for that and you can then stop by our lab to have the test done at a later time.    Video visits are billed at different rates depending on your insurance coverage.  Please reach out to your insurance provider with any questions.    If during the course of the call the physician/provider feels a video visit is not appropriate, you will not be charged for this service.\"    Patient has given verbal consent for Video visit? Yes  How would you like to obtain your AVS? MyChart  If you are dropped from the video visit, the video invite should be resent to: send email to: [email or web address]  Will anyone else be joining your video visit? No    Subjective     Kenney Mcgowan is a 60 year old male who presents today via video visit for the following health issues:    HPI      Medication Followup of lisinopril    Taking Medication as prescribed: yes    Side Effects:  None    Medication Helping Symptoms:  yes        Hypertension Follow-up      Do you check your blood pressure regularly outside of the clinic? No     Are you following a low salt diet? No    Are your blood pressures ever more than 140 on the top number (systolic) OR more   than 90 on the bottom number (diastolic), for example 140/90? Yes - sometimes      How many servings of fruits and vegetables do you eat daily?  2-3    On average, how many sweetened beverages do you drink each day (Examples: soda, juice, sweet tea, etc.  Do NOT count diet or " artificially sweetened beverages)?   0    How many days per week do you exercise enough to make your heart beat faster? 3 or less    How many minutes a day do you exercise enough to make your heart beat faster? 30 - 60  How many days per week do you miss taking your medication? 1    What makes it hard for you to take your medications?  remembering to take        [facility]  Changed to TE visit and total time was 6 mins          Review of Systems   Constitutional, HEENT, cardiovascular, pulmonary, GI, , musculoskeletal, neuro, skin, endocrine and psych systems are negative, except as otherwise noted.      Objective    Vitals - Patient Reported  Systolic (Patient Reported): 138  Diastolic (Patient Reported): (!) 90      Vitals:  No vitals were obtained today due to virtual visit.    Physical Exam   Clear speech.RESP: No audible wheeze,PSYCH: Mentation appears normal, affect normal/bright, judgement and insight intact, normal speech and appearance well-groomed.              Assessment & Plan     Kenney was seen today for hypertension and recheck medication.    Diagnoses and all orders for this visit:    Essential hypertension, benign  Comments:  Diagnosed since June 2020.has been on  Lisinopril since June  Increased to 10 mg once daily since 12/08/2020.   BP still in diastolic 90  Moniotor BP , has home digital Blood pressure  If Diastolic still in 90's or systoltic > 140- he is advised to increase  lisinopril 20 mg , that is 2 tabs of 10 mg.  Will run out sooner  FOLLOW UP on phone or video first week of jan  60 year old            Regular exercise    Return in about 3 weeks (around 1/7/2021) for virtual/video visit, BP Recheck/ HTN.    Luisa Fonseca MD  [facility]

## 2020-12-18 ASSESSMENT — ASTHMA QUESTIONNAIRES: ACT_TOTALSCORE: 22

## 2021-01-15 ENCOUNTER — HEALTH MAINTENANCE LETTER (OUTPATIENT)
Age: 61
End: 2021-01-15

## 2021-02-01 ENCOUNTER — OFFICE VISIT (OUTPATIENT)
Dept: LAB | Facility: SCHOOL | Age: 61
End: 2021-02-01
Payer: COMMERCIAL

## 2021-02-01 VITALS
TEMPERATURE: 97.8 F | OXYGEN SATURATION: 97 % | BODY MASS INDEX: 24.38 KG/M2 | HEIGHT: 72 IN | SYSTOLIC BLOOD PRESSURE: 138 MMHG | RESPIRATION RATE: 16 BRPM | HEART RATE: 80 BPM | DIASTOLIC BLOOD PRESSURE: 78 MMHG | WEIGHT: 180 LBS

## 2021-02-01 DIAGNOSIS — Z00.00 WELLNESS EXAMINATION: Primary | ICD-10-CM

## 2021-02-01 DIAGNOSIS — Z23 NEED FOR VACCINATION: ICD-10-CM

## 2021-02-01 PROCEDURE — 90715 TDAP VACCINE 7 YRS/> IM: CPT

## 2021-02-01 PROCEDURE — 90471 IMMUNIZATION ADMIN: CPT

## 2021-02-01 PROCEDURE — 99213 OFFICE O/P EST LOW 20 MIN: CPT | Mod: 25

## 2021-02-01 RX ORDER — BUDESONIDE AND FORMOTEROL FUMARATE DIHYDRATE 160; 4.5 UG/1; UG/1
2 AEROSOL RESPIRATORY (INHALATION) 2 TIMES DAILY
COMMUNITY
End: 2021-10-26

## 2021-02-01 ASSESSMENT — MIFFLIN-ST. JEOR: SCORE: 1666.53

## 2021-02-01 NOTE — PROGRESS NOTES
"  SUBJECTIVE:  CC: Kenney Mcgowan is an 60 year old woman who presents for Wellness Check at [facility].     Review of Healthy Lifestyle:    Do you get at least three servings of calcium containing foods daily (dairy, green leafy vegetables, etc.)? yes     Do you have a high-fiber diet? 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 Yes 30-50     Are you taking your medications regularly Yes     Have you had an eye exam in the past two years? No, will do check up soon    Do you see a dentist twice per year? yes    Do you have sleep apnea, excessive snoring or excessive daytime drowsiness? no    Do you use tobacco in any form? no       OBJECTIVE:    Vitals: /78 (BP Location: Right arm, Patient Position: Sitting, Cuff Size: Adult Large)   Pulse 80   Temp 97.8  F (36.6  C) (Tympanic)   Resp 16   Ht 1.816 m (5' 11.5\")   Wt 81.6 kg (180 lb)   SpO2 97%   BMI 24.76 kg/m    BMI= Body mass index is 24.76 kg/m .    HEARING: Right Ear:        500Hz:  RESPONSE- on Level:   20 db    1000 Hz: RESPONSE- on Level: 25 db   2000 Hz: RESPONSE- on Level: 40 db   4000 Hz: RESPONSE- on Level: 40 db    Left Ear:       500Hz:  RESPONSE- on Level:   20 db    1000 Hz: RESPONSE- on Level:   20 db    2000 Hz: RESPONSE- on Level:   20 db    4000 Hz: RESPONSE- on Level:   20 db     VISION:  Right eye:    Left eye:       Both eyes: 20/25  Corrective lenses?  Yes    Medication Reconciliation: complete    ASSESSMENT/PLAN:    Flu: doesn't get flu vaccine  Tdap: 01/25/2011, updated today  Labs: fasting this morning; doesn't want labs today, last lipids were done in 06/2020 and WNL.       COUNSELING:      reports that he has never smoked. He has quit using smokeless tobacco.  His smokeless tobacco use included chew.    Estimated body mass index is 24.76 kg/m  as calculated from the " "following:    Height as of this encounter: 1.816 m (5' 11.5\").    Weight as of this encounter: 81.6 kg (180 lb).       Counseling Resources  [employer]  [email or web address]    [place] SCHOOL PROVIDER  [facility]     "

## 2021-02-01 NOTE — PATIENT INSTRUCTIONS
"Kenney Mcgowan has completed a Wellness Check at the [facility] on 2/1/2021.      ____________________________________________  [place] [employer]CHO[employer] PROVIDER                                                                               Wellness Visit Recommendations:      See your regular primary care health provider every year in order to help stay healthy.    Review health changes.     Review your medicines if your doctor has prescribed any.    Talk to your provider about how often to have your cholesterol checked.    If you are at risk for diabetes, you should have a diabetes test (fasting glucose).    Shots: Get a flu shot each year. Get a tetanus shot every 10 years.     Review with your primary care provider other immunizations that you may need based on your age and/or medical/surgical history    Nutrition:     Eat at least 5 servings of fruits and vegetables each day.    Eat whole-grain bread, whole-wheat pasta and brown rice instead of white grains and rice.    Preventive Care to be reviewed by your primary care provider:      Males:             Prostrate Cancer Screening                          Colon Cancer Screening      Lifestyle:    Exercise at least 150 minutes a week (30 minutes a day, 5 days of the week). This will help you control your weight and help prevent disease or manage disease.    Limit alcohol to one drink per day or less depending on your past medical history.    No smoking.     Wear sunscreen to prevent skin cancer.    See your dentist every six months for an exam and cleaning.    Today's Vital Signs:  /78 (BP Location: Right arm, Patient Position: Sitting, Cuff Size: Adult Large)   Pulse 80   Temp 97.8  F (36.6  C) (Tympanic)   Resp 16   Ht 1.816 m (5' 11.5\")   Wt 81.6 kg (180 lb)   SpO2 97%   BMI 24.76 kg/m    "

## 2021-02-11 DIAGNOSIS — I10 ESSENTIAL HYPERTENSION, BENIGN: ICD-10-CM

## 2021-02-11 RX ORDER — LISINOPRIL 10 MG/1
10 TABLET ORAL DAILY
Qty: 30 TABLET | Refills: 1 | Status: SHIPPED | OUTPATIENT
Start: 2021-02-11 | End: 2021-03-09

## 2021-02-11 NOTE — TELEPHONE ENCOUNTER
Routing refill request to provider for review/approval because:  Last Rx for 10mg daily   Noted in Dec visit for pt to increase to 20mg though  New Rx needed  Tish BRUNO RN

## 2021-02-12 ENCOUNTER — MYC MEDICAL ADVICE (OUTPATIENT)
Dept: FAMILY MEDICINE | Facility: CLINIC | Age: 61
End: 2021-02-12

## 2021-02-12 DIAGNOSIS — I10 ESSENTIAL HYPERTENSION, BENIGN: Primary | ICD-10-CM

## 2021-02-12 NOTE — TELEPHONE ENCOUNTER
As,  Please see below and advise.  Should pt be on 10 mg of lisinopril or 20 mg?  Thanks,  Belén Arriola RN

## 2021-03-05 ENCOUNTER — MYC MEDICAL ADVICE (OUTPATIENT)
Dept: FAMILY MEDICINE | Facility: CLINIC | Age: 61
End: 2021-03-05

## 2021-03-05 DIAGNOSIS — I10 ESSENTIAL HYPERTENSION, BENIGN: Primary | ICD-10-CM

## 2021-03-05 NOTE — TELEPHONE ENCOUNTER
As,  Pt requesting 20 mg tablets of the lisinopril instead of 10 mg tablets.  Pended new order.  Please authorize if appropriate and route back to triage so we can let pt know.  Thanks,  Belén Arriola RN

## 2021-03-06 RX ORDER — LISINOPRIL 20 MG/1
20 TABLET ORAL DAILY
Qty: 90 TABLET | Refills: 1 | Status: SHIPPED | OUTPATIENT
Start: 2021-03-06 | End: 2021-09-15

## 2021-03-06 NOTE — TELEPHONE ENCOUNTER
I have refilled. Seems like he has a new primary care physicain - recent wellness exam    BP Readings from Last 3 Encounters:   02/01/21 138/78   06/10/20 (!) 147/96   06/09/20 (!) 169/100   (I10) Essential hypertension, benign  (primary encounter diagnosis)  Plan: lisinopril (ZESTRIL) 20 MG tablet qd

## 2021-03-08 NOTE — TELEPHONE ENCOUNTER
Spoke with patient   He had physical at place he works  Marian still PCP  Also had Covid vaccines - updated chart  Tish BRUNO RN

## 2021-03-09 RX ORDER — LISINOPRIL 20 MG/1
20 TABLET ORAL DAILY
Qty: 30 TABLET | Refills: 1 | Status: SHIPPED | OUTPATIENT
Start: 2021-03-09 | End: 2021-10-26

## 2021-03-10 ENCOUNTER — TRANSFERRED RECORDS (OUTPATIENT)
Dept: HEALTH INFORMATION MANAGEMENT | Facility: CLINIC | Age: 61
End: 2021-03-10

## 2021-03-18 DIAGNOSIS — Z12.11 ENCOUNTER FOR COLORECTAL CANCER SCREENING: ICD-10-CM

## 2021-03-18 DIAGNOSIS — Z12.12 ENCOUNTER FOR COLORECTAL CANCER SCREENING: ICD-10-CM

## 2021-03-28 LAB — COLOGUARD-ABSTRACT: NEGATIVE

## 2021-04-13 ENCOUNTER — OFFICE VISIT (OUTPATIENT)
Dept: FAMILY MEDICINE | Facility: CLINIC | Age: 61
End: 2021-04-13
Payer: COMMERCIAL

## 2021-04-13 VITALS
OXYGEN SATURATION: 97 % | DIASTOLIC BLOOD PRESSURE: 82 MMHG | HEART RATE: 76 BPM | SYSTOLIC BLOOD PRESSURE: 122 MMHG | RESPIRATION RATE: 15 BRPM | BODY MASS INDEX: 23.98 KG/M2 | HEIGHT: 72 IN | WEIGHT: 177 LBS | TEMPERATURE: 97.7 F

## 2021-04-13 DIAGNOSIS — R39.9 LOWER URINARY TRACT SYMPTOMS (LUTS): ICD-10-CM

## 2021-04-13 DIAGNOSIS — J45.41 MODERATE PERSISTENT ASTHMA WITH ACUTE EXACERBATION: Primary | ICD-10-CM

## 2021-04-13 DIAGNOSIS — Z12.5 SCREENING FOR PROSTATE CANCER: ICD-10-CM

## 2021-04-13 DIAGNOSIS — Z13.220 SCREENING FOR LIPID DISORDERS: ICD-10-CM

## 2021-04-13 DIAGNOSIS — I10 ESSENTIAL HYPERTENSION, BENIGN: ICD-10-CM

## 2021-04-13 PROBLEM — J45.40 MODERATE PERSISTENT ASTHMA WITHOUT COMPLICATION: Status: ACTIVE | Noted: 2021-04-13

## 2021-04-13 PROBLEM — J45.30 MILD PERSISTENT ASTHMA WITHOUT COMPLICATION: Status: RESOLVED | Noted: 2018-07-06 | Resolved: 2021-04-13

## 2021-04-13 PROCEDURE — 99214 OFFICE O/P EST MOD 30 MIN: CPT | Performed by: FAMILY MEDICINE

## 2021-04-13 RX ORDER — ALBUTEROL SULFATE 90 UG/1
1-2 AEROSOL, METERED RESPIRATORY (INHALATION) EVERY 4 HOURS PRN
Qty: 1 G | Refills: 11 | Status: SHIPPED | OUTPATIENT
Start: 2021-04-13 | End: 2021-08-12

## 2021-04-13 RX ORDER — MONTELUKAST SODIUM 10 MG/1
10 TABLET ORAL AT BEDTIME
Qty: 30 TABLET | Refills: 1 | Status: SHIPPED | OUTPATIENT
Start: 2021-04-13 | End: 2021-06-14

## 2021-04-13 RX ORDER — BUDESONIDE AND FORMOTEROL FUMARATE DIHYDRATE 80; 4.5 UG/1; UG/1
2 AEROSOL RESPIRATORY (INHALATION) 2 TIMES DAILY
Qty: 10.2 G | Refills: 11 | Status: SHIPPED | OUTPATIENT
Start: 2021-04-13 | End: 2021-10-26

## 2021-04-13 ASSESSMENT — MIFFLIN-ST. JEOR: SCORE: 1647.93

## 2021-04-13 NOTE — PATIENT INSTRUCTIONS
1. Moderate persistent asthma without complication  Rescue inhaler as needed    - VENTOLIN  (90 Base) MCG/ACT inhaler; Inhale 1-2 puffs into the lungs every 4 hours as needed for shortness of breath / dyspnea or wheezing  Dispense: 1 g; Refill: 11    preventative inhaler so it can decrease the inflammation response to allergens- such a cats danders in your case  - budesonide-formoterol (SYMBICORT) 80-4.5 MCG/ACT Inhaler; Inhale 2 puffs into the lungs 2 times daily  Dispense: 10.2 g; Refill: 11    Besides over the counter zyrtec- ok to take following antihistamine- as it works differently and may help prevent wheezing/ asthma attack  - montelukast (SINGULAIR) 10 MG tablet; Take 1 tablet (10 mg) by mouth At Bedtime  Dispense: 30 tablet; Refill: 1

## 2021-04-13 NOTE — LETTER
My Asthma Action Plan    Name: Kenney Mcgowan   YOB: 1960  Date: 4/13/2021   My doctor: Luisa Fonseca MD   My clinic: [facility]        My Control Medicine: Budesonide + formoterol (Symbicort HFA) -  80/4.5 mcg twice a day  My Rescue Medicine: Albuterol (Proair/Ventolin/Proventil HFA) 2-4 puffs EVERY 4 HOURS as needed. Use a spacer if recommended by your provider.   My Asthma Severity:   Moderate Persistent  Know your asthma triggers: animal dander  animal dander            GREEN ZONE   Good Control    I feel good    No cough or wheeze    Can work, sleep and play without asthma symptoms       Take your asthma control medicine every day.     1. If exercise triggers your asthma, take your rescue medication    15 minutes before exercise or sports, and    During exercise if you have asthma symptoms  2. Spacer to use with inhaler: If you have a spacer, make sure to use it with your inhaler             YELLOW ZONE Getting Worse  I have ANY of these:    I do not feel good    Cough or wheeze    Chest feels tight    Wake up at night   1. Keep taking your Green Zone medications  2. Start taking your rescue medicine:    every 20 minutes for up to 1 hour. Then every 4 hours for 24-48 hours.  3. If you stay in the Yellow Zone for more than 12-24 hours, contact your doctor.  4. If you do not return to the Green Zone in 12-24 hours or you get worse, start taking your oral steroid medicine if prescribed by your provider.           RED ZONE Medical Alert - Get Help  I have ANY of these:    I feel awful    Medicine is not helping    Breathing getting harder    Trouble walking or talking    Nose opens wide to breathe       1. Take your rescue medicine NOW  2. If your provider has prescribed an oral steroid medicine, start taking it NOW  3. Call your doctor NOW  4. If you are still in the Red Zone after 20 minutes and you have not reached your doctor:    Take your rescue medicine again  and    Call 911 or go to the emergency room right away    See your regular doctor within 2 weeks of an Emergency Room or Urgent Care visit for follow-up treatment.          Annual Reminders:  Meet with Asthma Educator,  Flu Shot in the Fall, consider Pneumonia Vaccination for patients with asthma (aged 19 and older).    Pharmacy: [id number] [address] AT [place] [address] & [facility]    Electronically signed by Luisa Fonseca MD   Date: 04/13/21                      Asthma Triggers  How To Control Things That Make Your Asthma Worse    Triggers are things that make your asthma worse.  Look at the list below to help you find your triggers and what you can do about them.  You can help prevent asthma flare-ups by staying away from your triggers.      Trigger                                                          What you can do   Cigarette Smoke  Tobacco smoke can make asthma worse. Do not allow smoking in your home, car or around you.  Be sure no one smokes at a child s day care or school.  If you smoke, ask your health care provider for ways to help you quit.  Ask family members to quit too.  Ask your health care provider for a referral to Quit Plan to help you quit smoking, or call 5-216-795-PLAN.     Colds, Flu, Bronchitis  These are common triggers of asthma. Wash your hands often.  Don t touch your eyes, nose or mouth.  Get a flu shot every year.     Dust Mites  These are tiny bugs that live in cloth or carpet. They are too small to see. Wash sheets and blankets in hot water every week.   Encase pillows and mattress in dust mite proof covers.  Avoid having carpet if you can. If you have carpet, vacuum weekly.   Use a dust mask and HEPA vacuum.   Pollen and Outdoor Mold  Some people are allergic to trees, grass, or weed pollen, or molds. Try to keep your windows closed.  Limit time out doors when pollen count is high.   Ask you health care provider about taking  medicine during allergy season.     Animal Dander  Some people are allergic to skin flakes, urine or saliva from pets with fur or feathers. Keep pets with fur or feathers out of your home.    If you can t keep the pet outdoors, then keep the pet out of your bedroom.  Keep the bedroom door closed.  Keep pets off cloth furniture and away from stuffed toys.     Mice, Rats, and Cockroaches   Some people are allergic to the waste from these pests.   Cover food and garbage.  Clean up spills and food crumbs.  Store grease in the refrigerator.   Keep food out of the bedroom.   Indoor Mold  This can be a trigger if your home has high moisture. Fix leaking faucets, pipes, or other sources of water.   Clean moldy surfaces.  Dehumidify basement if it is damp and smelly.   Smoke, Strong Odors, and Sprays  These can reduce air quality. Stay away from strong odors and sprays, such as perfume, powder, hair spray, paints, smoke incense, paint, cleaning products, candles and new carpet.   Exercise or Sports  Some people with asthma have this trigger. Be active!  Ask your doctor about taking medicine before sports or exercise to prevent symptoms.    Warm up for 5-10 minutes before and after sports or exercise.     Other Triggers of Asthma  Cold air:  Cover your nose and mouth with a scarf.  Sometimes laughing or crying can be a trigger.  Some medicines and food can trigger asthma.

## 2021-04-13 NOTE — PROGRESS NOTES
1. Moderate persistent asthma with exacerbation because  at home exposure of new cats since past few months.  He has been rarely using preventative inhaler, Symbicort  to make it last longer- has not taken it for weeks     ACT/AAP discussed in detail.    Care distinctions about the benefit and technique of rescue versus preventative inhalers are explained.      Rescue inhaler as needed    - VENTOLIN  (90 Base) MCG/ACT inhaler; Inhale 1-2 puffs into the lungs every 4 hours as needed for shortness of breath / dyspnea or wheezing  Dispense: 1 g; Refill: 11    preventative inhaler- budesonide-formoterol (SYMBICORT) 80-4.5 MCG/ACT Inhaler; Inhale 2 puffs into the lungs 2 times daily  Dispense: 10.2 g; Refill: 11    Besides over the counter zyrtec- ok to take following antihistamine- as it works differently and may help prevent wheezing/ asthma attack  - montelukast (SINGULAIR) 10 MG tablet; Take 1 tablet (10 mg) by mouth At Bedtime  Dispense: 30 tablet; Refill: 1    Potential medication side effects were discussed with the patient; let me know if any occur.      2. Lower urinary tract symptoms (LUTS)  Discussed symptoms in detail very are not affecting quality of life, no nocturia.  He is willing to cut back on excessive hydration.  He is willing to monitor symptoms and at the moment not interested in starting medication like Flomax.  He is deferred urine analysis as his symptoms are not bothersome.  He will keep us posted if any changes.  Urine analysis is also future.  3. Essential hypertension, benign  Well-controlled on lisinopril 20 mg p.o. daily does not need refill  - **Comprehensive metabolic panel FUTURE anytime; Future    4. Screening for prostate cancer  Pros and cons of screening with blood test discussed and he would like that to be future as well.  - **Prostate spec antigen screen FUTURE 1yr; Future    5. Screening for lipid disorders  Advised to make fasting lab appointment either on the day of  physical or couple days prior.  - Lipid panel reflex to direct LDL Fasting; Future              Subjective   Garcia is a 61 year old who presents for the following health issues     HPI     Asthma Follow-Up    Was ACT completed today?    Yes    ACT Total Scores 2021   ACT TOTAL SCORE -   ASTHMA ER VISITS -   ASTHMA HOSPITALIZATIONS -   ACT TOTAL SCORE (Goal Greater than or Equal to 20) 15   In the past 12 months, how many times did you visit the emergency room for your asthma without being admitted to the hospital? 0   In the past 12 months, how many times were you hospitalized overnight because of your asthma? 0       How many days per week do you miss taking your asthma controller medication?  0, takes as needed, med     Please describe any recent triggers for your asthma: animal dander    Have you had any Emergency Room Visits, Urgent Care Visits, or Hospital Admissions since your last office visit?  No      How many servings of fruits and vegetables do you eat daily?  2-3    On average, how many sweetened beverages do you drink each day (Examples: soda, juice, sweet tea, etc.  Do NOT count diet or artificially sweetened beverages)?   0    How many days per week do you exercise enough to make your heart beat faster? 4    How many minutes a day do you exercise enough to make your heart beat faster? 60 or more    How many days per week do you miss taking your medication? 1  Has a new cat 2 new cats that are hypoallergenic but since their arrival at home he is noticed more wheezing.  He feels his asthma is well controlled.  He has been rationing on his preventative inhaler Symbicort and brought 3-year old Symbicort inhaler there is still 20 puffs left.  He does use Zyrtec for allergies especially with changing allergies.      Review of Systems he discussed urinary symptoms that have been going on for at least close to a year.  Also noted poor urinary stream, hesitancy to start urinating and urgency,&  "frequency- no blood in urine. Has noted it since last.  No fever no flank pain.  Also wants to know when to get his complete physical and blood test repeated.  Constitutional, HEENT, cardiovascular, pulmonary, GI, , musculoskeletal, neuro, skin, endocrine and psych systems are negative, except as otherwise noted.      Objective    /82   Pulse 76   Temp 97.7  F (36.5  C) (Oral)   Resp 15   Ht 1.816 m (5' 11.5\")   Wt 80.3 kg (177 lb)   SpO2 97%   BMI 24.34 kg/m    Body mass index is 24.34 kg/m .  Physical Exam   GENERAL: healthy, alert and no distress  NECK: no adenopathy, no asymmetry, masses, or scars and thyroid normal to palpation  RESP: Bilateral scattered wheezing without any signs or symptoms of respiratory distress.  No rhonchi no crackles.  CV: regular rate and rhythm, normal S1 S2, no S3 or S4, no murmur, click or rub, no peripheral edema and peripheral pulses strong  ABDOMEN: soft, nontender, no hepatosplenomegaly, no masses and bowel sounds normal  MS: no gross musculoskeletal defects noted, no edema                "

## 2021-04-14 ASSESSMENT — ASTHMA QUESTIONNAIRES: ACT_TOTALSCORE: 15

## 2021-06-14 ENCOUNTER — MYC MEDICAL ADVICE (OUTPATIENT)
Dept: FAMILY MEDICINE | Facility: CLINIC | Age: 61
End: 2021-06-14

## 2021-06-14 DIAGNOSIS — J45.41 MODERATE PERSISTENT ASTHMA WITH ACUTE EXACERBATION: ICD-10-CM

## 2021-06-15 NOTE — TELEPHONE ENCOUNTER
Routing refill request to provider for review/approval because:  ACT below 20  Tish BRUNO RN

## 2021-06-15 NOTE — PROGRESS NOTES
This encounter was created solely for the purpose of releasing the future order that was placed for Cologuard.  This is a necessary step in order for the results to be abstracted once they are available.  Denia Mijares   English

## 2021-06-16 ASSESSMENT — ASTHMA QUESTIONNAIRES: ACT_TOTALSCORE: 19

## 2021-06-21 DIAGNOSIS — J45.41 MODERATE PERSISTENT ASTHMA WITH ACUTE EXACERBATION: ICD-10-CM

## 2021-06-21 RX ORDER — MONTELUKAST SODIUM 10 MG/1
TABLET ORAL
Qty: 30 TABLET | Refills: 1 | Status: SHIPPED | OUTPATIENT
Start: 2021-06-21 | End: 2021-08-16

## 2021-06-21 NOTE — TELEPHONE ENCOUNTER
I think this was routed incorrectly?  I will route to [facility] staff.    Thank you,    Abbie Llamas RN  [facility]

## 2021-06-23 RX ORDER — MONTELUKAST SODIUM 10 MG/1
10 TABLET ORAL AT BEDTIME
Start: 2021-06-23

## 2021-08-12 ENCOUNTER — TELEPHONE (OUTPATIENT)
Dept: FAMILY MEDICINE | Facility: CLINIC | Age: 61
End: 2021-08-12

## 2021-08-12 DIAGNOSIS — J45.41 MODERATE PERSISTENT ASTHMA WITH ACUTE EXACERBATION: ICD-10-CM

## 2021-08-12 RX ORDER — ALBUTEROL SULFATE 90 UG/1
1-2 AEROSOL, METERED RESPIRATORY (INHALATION) EVERY 4 HOURS PRN
Qty: 18 G | Refills: 1 | Status: SHIPPED | OUTPATIENT
Start: 2021-08-12 | End: 2021-08-25

## 2021-08-12 NOTE — TELEPHONE ENCOUNTER
Reason for Call:  Medication or medication refill:    Do you use a [facility]?  Name of the pharmacy and phone number for the current request:  [id number] [place] [address] & [facility]      Name of the medication requested: ventolin inhaler    Other request: pt would like 2 inhalers    Can we leave a detailed message on this number? YES    Phone number patient can be reached at: Cell number on file:    Telephone Information:   Mobile [phone]       Best Time: Any    Call taken on 8/12/2021 at 1:46 PM by Terra Valenzuela

## 2021-08-12 NOTE — TELEPHONE ENCOUNTER
Original Rx was for 1 inhaler with 11 refills.  Rx sent for 18g with 1 refill    Abbie Zamora RN

## 2021-08-13 DIAGNOSIS — J45.41 MODERATE PERSISTENT ASTHMA WITH ACUTE EXACERBATION: ICD-10-CM

## 2021-08-13 NOTE — TELEPHONE ENCOUNTER
Pt calling requesting 2 albuterol inhalers at once.    Spoke to pharm and insurance only covers 1 every 13 days otherwise it is $78 out of pocket.    Pt aware.  And will wait foir 13 days to  2nd inhaler.  Belén Arriola RN

## 2021-08-16 ENCOUNTER — TELEPHONE (OUTPATIENT)
Dept: FAMILY MEDICINE | Facility: CLINIC | Age: 61
End: 2021-08-16

## 2021-08-23 DIAGNOSIS — J45.41 MODERATE PERSISTENT ASTHMA WITH ACUTE EXACERBATION: ICD-10-CM

## 2021-08-24 ENCOUNTER — MYC MEDICAL ADVICE (OUTPATIENT)
Dept: FAMILY MEDICINE | Facility: CLINIC | Age: 61
End: 2021-08-24

## 2021-08-24 DIAGNOSIS — J45.41 MODERATE PERSISTENT ASTHMA WITH ACUTE EXACERBATION: ICD-10-CM

## 2021-08-25 RX ORDER — ALBUTEROL SULFATE 90 UG/1
1-2 AEROSOL, METERED RESPIRATORY (INHALATION) EVERY 4 HOURS PRN
Qty: 8.5 G | Refills: 11 | Status: SHIPPED | OUTPATIENT
Start: 2021-08-25 | End: 2021-10-26

## 2021-08-25 RX ORDER — MONTELUKAST SODIUM 10 MG/1
TABLET ORAL
Start: 2021-08-25

## 2021-08-25 NOTE — TELEPHONE ENCOUNTER
A.S.    Please see [employer] message below.  Albuterol order T'd up.    Abbie Zamora RN

## 2021-09-12 ENCOUNTER — HEALTH MAINTENANCE LETTER (OUTPATIENT)
Age: 61
End: 2021-09-12

## 2021-09-14 DIAGNOSIS — I10 ESSENTIAL HYPERTENSION, BENIGN: ICD-10-CM

## 2021-09-15 RX ORDER — LISINOPRIL 20 MG/1
TABLET ORAL
Qty: 90 TABLET | Refills: 0 | Status: SHIPPED | OUTPATIENT
Start: 2021-09-15 | End: 2021-10-26

## 2021-09-26 DIAGNOSIS — J45.41 MODERATE PERSISTENT ASTHMA WITH ACUTE EXACERBATION: ICD-10-CM

## 2021-09-26 NOTE — TELEPHONE ENCOUNTER
Reason for Call: Request for an order or referral: Med refill    Order or referral being requested:  Montelukast 10 mg     Date needed: as soon as possible    Has the patient been seen by the PCP for this problem? YES    Additional comments: NON    Phone number Patient can be reached at:  Cell number on file:    Telephone Information:   Mobile [phone]       Best Time:  Anytime    Can we leave a detailed message on this number?  YES    Call taken on 9/26/2021 at 10:03 AM by Benedicto Jaeger

## 2021-09-27 RX ORDER — MONTELUKAST SODIUM 10 MG/1
TABLET ORAL
Qty: 30 TABLET | Refills: 0 | Status: SHIPPED | OUTPATIENT
Start: 2021-09-27 | End: 2021-10-25

## 2021-10-22 NOTE — PATIENT INSTRUCTIONS
shingrax vaccine   Blood pressure target < 140/90- otherwise see provider.    Preventive Health Recommendations  Male Ages 50 - 64    Yearly exam:             See your health care provider every year in order to  o   Review health changes.   o   Discuss preventive care.    o   Review your medicines if your doctor has prescribed any.     Have a cholesterol test every 5 years, or more frequently if you are at risk for high cholesterol/heart disease.     Have a diabetes test (fasting glucose) every three years. If you are at risk for diabetes, you should have this test more often.     Have a colonoscopy at age 50, or have a yearly FIT test (stool test). These exams will check for colon cancer.      Talk with your health care provider about whether or not a prostate cancer screening test (PSA) is right for you.    You should be tested each year for STDs (sexually transmitted diseases), if you re at risk.     Shots: Get a flu shot each year. Get a tetanus shot every 10 years.     Nutrition:    Eat at least 5 servings of fruits and vegetables daily.     Eat whole-grain bread, whole-wheat pasta and brown rice instead of white grains and rice.     Get adequate Calcium and Vitamin D.     Lifestyle    Exercise for at least 150 minutes a week (30 minutes a day, 5 days a week). This will help you control your weight and prevent disease.     Limit alcohol to one drink per day.     No smoking.     Wear sunscreen to prevent skin cancer.     See your dentist every six months for an exam and cleaning.     See your eye doctor every 1 to 2 years.

## 2021-10-22 NOTE — PROGRESS NOTES
SUBJECTIVE:   CC: Kenney Mcgowan is an 61 year old male who presents for preventative health visit.       Patient has been advised of split billing requirements and indicates understanding: Yes  Healthy Habits:   PHQ-2 Total Score: 0          Cat allergies concerns - having a bit more sneezing  Use of inhaler  Need for refill on inhalers     Today's PHQ-2 Score:   PHQ-2 ( 1999 Pfizer) 10/26/2021   Q1: Little interest or pleasure in doing things 0   Q2: Feeling down, depressed or hopeless 0   PHQ-2 Score 0   PHQ-2 Total Score (12-17 Years)- Positive if 3 or more points; Administer PHQ-A if positive 0   Q1: Little interest or pleasure in doing things Not at all   Q2: Feeling down, depressed or hopeless Not at all   PHQ-2 Score 0       Abuse: Current or Past(Physical, Sexual or Emotional)- No  Do you feel safe in your environment? Yes    Have you ever done Advance Care Planning? (For example, a Health Directive, POLST, or a discussion with a medical provider or your loved ones about your wishes): Yes, patient states has an Advance Care Planning document and will bring a copy to the clinic.    Social History     Tobacco Use     Smoking status: Never Smoker     Smokeless tobacco: Former User     Types: Chew   Substance Use Topics     Alcohol use: Yes     Comment: occassionally     If you drink alcohol do you typically have >3 drinks per day or >7 drinks per week? Yes      Alcohol Use 10/26/2021   Prescreen: >3 drinks/day or >7 drinks/week? No   Prescreen: >3 drinks/day or >7 drinks/week? -   No flowsheet data found.    Last PSA:   PSA   Date Value Ref Range Status   07/06/2012 0.80 0 - 4 ug/L Final     Prostate Specific Antigen Screen   Date Value Ref Range Status   10/26/2021 1.09 0.00 - 4.00 ug/L Final       Reviewed orders with patient. Reviewed health maintenance and updated orders accordingly - Yes  BP Readings from Last 3 Encounters:   10/26/21 (!) 165/94   04/13/21 122/82   02/01/21 138/78    Wt Readings from Last 3  Encounters:   10/26/21 79.1 kg (174 lb 4.8 oz)   04/13/21 80.3 kg (177 lb)   02/01/21 81.6 kg (180 lb)                    Reviewed and updated as needed this visit by clinical staff  Tobacco  Allergies  Meds  Problems  Med Hx  Surg Hx  Fam Hx         Reviewed and updated as needed this visit by Provider  Tobacco  Allergies  Meds  Problems  Med Hx  Surg Hx  Fam Hx        Past Medical History:   Diagnosis Date     Allergic rhinitis due to other allergen         Review of Systems  CONSTITUTIONAL: NEGATIVE for fever, chills, change in weight  INTEGUMENTARY/SKIN: NEGATIVE for worrisome rashes, moles or lesions  EYES: NEGATIVE for vision changes or irritation  ENT: NEGATIVE for ear, mouth and throat problems  RESP: NEGATIVE for significant cough or SOB  CV: NEGATIVE for chest pain, palpitations or peripheral edema  GI: NEGATIVE for nausea, abdominal pain, heartburn, or change in bowel habits   male: negative for dysuria, hematuria, decreased urinary stream, erectile dysfunction, urethral discharge  MUSCULOSKELETAL: NEGATIVE for significant arthralgias or myalgia  NEURO: NEGATIVE for weakness, dizziness or paresthesias  PSYCHIATRIC: NEGATIVE for changes in mood or affect    OBJECTIVE:   BP (!) 165/94   Pulse 75   Temp 97.8  F (36.6  C) (Temporal)   Wt 79.1 kg (174 lb 4.8 oz)   SpO2 97%   BMI 23.97 kg/m      Physical Exam  GENERAL: healthy, alert and no distress  EYES: Eyes grossly normal to inspection, PERRL and conjunctivae and sclerae normal  HENT: ear canals and TM's normal, nose and mouth without ulcers or lesions  NECK: no adenopathy, no asymmetry, masses, or scars and thyroid normal to palpation  RESP: lungs clear to auscultation - no rales, rhonchi or wheezes  CV: regular rate and rhythm, normal S1 S2, no S3 or S4, no murmur, click or rub, no peripheral edema and peripheral pulses strong  ABDOMEN: soft, nontender, no hepatosplenomegaly, no masses and bowel sounds normal  MS: no gross  musculoskeletal defects noted, no edema  SKIN: no suspicious lesions or rashes  NEURO: Normal strength and tone, mentation intact and speech normal  PSYCH: mentation appears normal, affect normal/bright    Diagnostic Test Results:  Labs reviewed in Epic  Results for orders placed or performed in visit on 10/26/21   Basic metabolic panel  (Ca, Cl, CO2, Creat, Gluc, K, Na, BUN)     Status: Normal   Result Value Ref Range    Sodium 136 133 - 144 mmol/L    Potassium 4.4 3.4 - 5.3 mmol/L    Chloride 105 94 - 109 mmol/L    Carbon Dioxide (CO2) 26 20 - 32 mmol/L    Anion Gap 5 3 - 14 mmol/L    Urea Nitrogen 17 7 - 30 mg/dL    Creatinine 0.98 0.66 - 1.25 mg/dL    Calcium 9.5 8.5 - 10.1 mg/dL    Glucose 97 70 - 99 mg/dL    GFR Estimate 84 >60 mL/min/1.73m2   Lipid panel reflex to direct LDL Fasting     Status: Abnormal   Result Value Ref Range    Cholesterol 243 (H) <200 mg/dL    Triglycerides 67 <150 mg/dL    Direct Measure HDL 90 >=40 mg/dL    LDL Cholesterol Calculated 140 (H) <=100 mg/dL    Non HDL Cholesterol 153 (H) <130 mg/dL    Patient Fasting > 8hrs? No     Narrative    Cholesterol  Desirable:  <200 mg/dL    Triglycerides  Normal:  Less than 150 mg/dL  Borderline High:  150-199 mg/dL  High:  200-499 mg/dL  Very High:  Greater than or equal to 500 mg/dL    Direct Measure HDL  Female:  Greater than or equal to 50 mg/dL   Male:  Greater than or equal to 40 mg/dL    LDL Cholesterol  Desirable:  <100mg/dL  Above Desirable:  100-129 mg/dL   Borderline High:  130-159 mg/dL   High:  160-189 mg/dL   Very High:  >= 190 mg/dL    Non HDL Cholesterol  Desirable:  130 mg/dL  Above Desirable:  130-159 mg/dL  Borderline High:  160-189 mg/dL  High:  190-219 mg/dL  Very High:  Greater than or equal to 220 mg/dL   PSA, screen     Status: Normal   Result Value Ref Range    Prostate Specific Antigen Screen 1.09 0.00 - 4.00 ug/L       ASSESSMENT/PLAN:   Kenney was seen today for physical.    Diagnoses and all orders for this  visit:    Routine general medical examination at a health care facility  Colon cancer screening options reviewed and he has completed cologuard- its negative- repeat is advised in 3 yrs 02/2024.  PSA pros and cons discussed , patient willing to proceed.    Vacc[facility] arely at [facility].  PPSV23 given today .  He is Up to date  On other vaccinations     Moderate persistent asthma with acute exacerbation, mild due to animal dander exposure   -   ACT/AAP reviewed  Over all Controlled asthma  Medications refill sent  : montelukast (SINGULAIR) 10 MG tablet; TAKE 1 TABLET(10 MG) BY MOUTH AT BEDTIME  -     albuterol (PROAIR HFA/PROVENTIL HFA/VENTOLIN HFA) 108 (90 Base) MCG/ACT inhaler; Inhale 1-2 puffs into the lungs every 4 hours as needed for shortness of breath / dyspnea or wheezing  -     budesonide-formoterol (SYMBICORT) 160-4.5 MCG/ACT Inhaler; Inhale 2 puffs into the lungs 2 times daily    Essential hypertension, benign  -   Increase   lisinopril (ZESTRIL) 20 MG tablet; Take 1 tablet (20 mg) by mouth daily  Follow up in 2-4 weeks, VV or TE ok  -     Basic metabolic panel  (Ca, Cl, CO2, Creat, Gluc, K, Na, BUN)  -     Lipid panel reflex to direct LDL Fasting    Screening for prostate cancer  -     PSA, screen    Rotator cuff arthropathy of left shoulder  Comments:  many years- stable with excercise     Other orders  -     PPSV23, IM/SUBQ (2+ YRS) - [id number]    The 10-year ASCVD risk score (Joss CALLE Jr., et al., 2013) is: 11.1%    Values used to calculate the score:      Age: 61 years      Sex: Male      Is Non- : No      Diabetic: No      Tobacco smoker: No      Systolic Blood Pressure: 165 mmHg      Is BP treated: Yes      HDL Cholesterol: 90 mg/dL      Total Cholesterol: 243 mg/dL      Patient has been advised of split billing requirements and indicates understanding: Yes  COUNSELING:   Reviewed preventive health counseling, as reflected in patient instructions       Regular  "exercise       Healthy diet/nutrition       Vision screening       Hearing screening    Estimated body mass index is 23.97 kg/m  as calculated from the following:    Height as of 4/13/21: 1.816 m (5' 11.5\").    Weight as of this encounter: 79.1 kg (174 lb 4.8 oz).     Weight management plan: Discussed healthy diet and exercise guidelines    He reports that he has never smoked. He has quit using smokeless tobacco.  His smokeless tobacco use included chew.      Counseling Resources:  ATP IV Guidelines  Pooled Cohorts Equation Calculator  FRAX Risk Assessment  ICSI Preventive Guidelines  Dietary Guidelines for Americans, 2010  USDA's MyPlate  ASA Prophylaxis  Lung CA Screening    Luisa Fonseca MD  [facility]  "

## 2021-10-25 RX ORDER — MONTELUKAST SODIUM 10 MG/1
TABLET ORAL
Qty: 90 TABLET | Refills: 0 | Status: SHIPPED | OUTPATIENT
Start: 2021-10-25 | End: 2021-10-26

## 2021-10-26 ENCOUNTER — OFFICE VISIT (OUTPATIENT)
Dept: FAMILY MEDICINE | Facility: CLINIC | Age: 61
End: 2021-10-26
Payer: COMMERCIAL

## 2021-10-26 VITALS
SYSTOLIC BLOOD PRESSURE: 165 MMHG | WEIGHT: 174.3 LBS | OXYGEN SATURATION: 97 % | HEART RATE: 75 BPM | DIASTOLIC BLOOD PRESSURE: 94 MMHG | BODY MASS INDEX: 23.97 KG/M2 | TEMPERATURE: 97.8 F

## 2021-10-26 DIAGNOSIS — I10 ESSENTIAL HYPERTENSION, BENIGN: ICD-10-CM

## 2021-10-26 DIAGNOSIS — Z00.00 ROUTINE GENERAL MEDICAL EXAMINATION AT A HEALTH CARE FACILITY: Primary | ICD-10-CM

## 2021-10-26 DIAGNOSIS — Z12.5 SCREENING FOR PROSTATE CANCER: ICD-10-CM

## 2021-10-26 DIAGNOSIS — M12.812 ROTATOR CUFF ARTHROPATHY OF LEFT SHOULDER: ICD-10-CM

## 2021-10-26 DIAGNOSIS — J45.41 MODERATE PERSISTENT ASTHMA WITH ACUTE EXACERBATION: ICD-10-CM

## 2021-10-26 PROCEDURE — 90471 IMMUNIZATION ADMIN: CPT | Performed by: FAMILY MEDICINE

## 2021-10-26 PROCEDURE — 36415 COLL VENOUS BLD VENIPUNCTURE: CPT | Performed by: FAMILY MEDICINE

## 2021-10-26 PROCEDURE — 90732 PPSV23 VACC 2 YRS+ SUBQ/IM: CPT | Performed by: FAMILY MEDICINE

## 2021-10-26 PROCEDURE — G0103 PSA SCREENING: HCPCS | Performed by: FAMILY MEDICINE

## 2021-10-26 PROCEDURE — 80048 BASIC METABOLIC PNL TOTAL CA: CPT | Performed by: FAMILY MEDICINE

## 2021-10-26 PROCEDURE — 80061 LIPID PANEL: CPT | Performed by: FAMILY MEDICINE

## 2021-10-26 PROCEDURE — 99396 PREV VISIT EST AGE 40-64: CPT | Mod: 25 | Performed by: FAMILY MEDICINE

## 2021-10-26 RX ORDER — LISINOPRIL 20 MG/1
20 TABLET ORAL DAILY
Qty: 90 TABLET | Refills: 3 | Status: SHIPPED | OUTPATIENT
Start: 2021-10-26 | End: 2022-05-11

## 2021-10-26 RX ORDER — ALBUTEROL SULFATE 90 UG/1
1-2 AEROSOL, METERED RESPIRATORY (INHALATION) EVERY 4 HOURS PRN
Qty: 8.5 G | Refills: 11 | Status: SHIPPED | OUTPATIENT
Start: 2021-10-26 | End: 2022-12-16

## 2021-10-26 RX ORDER — MONTELUKAST SODIUM 10 MG/1
TABLET ORAL
Qty: 90 TABLET | Refills: 3 | Status: SHIPPED | OUTPATIENT
Start: 2021-10-26 | End: 2021-11-01

## 2021-10-26 RX ORDER — BUDESONIDE AND FORMOTEROL FUMARATE DIHYDRATE 160; 4.5 UG/1; UG/1
2 AEROSOL RESPIRATORY (INHALATION) 2 TIMES DAILY
Qty: 10.2 G | Refills: 11 | Status: SHIPPED | OUTPATIENT
Start: 2021-10-26 | End: 2023-02-15

## 2021-10-26 ASSESSMENT — ENCOUNTER SYMPTOMS
CHILLS: 0
HEMATOCHEZIA: 0
HEARTBURN: 0
HEMATURIA: 0
JOINT SWELLING: 0
NERVOUS/ANXIOUS: 0
CONSTIPATION: 0
NAUSEA: 0
DIARRHEA: 0
EYE PAIN: 0
PARESTHESIAS: 0
SORE THROAT: 0
ARTHRALGIAS: 0
WEAKNESS: 0
HEADACHES: 0
MYALGIAS: 0
PALPITATIONS: 0
FEVER: 0
DYSURIA: 0
SHORTNESS OF BREATH: 0
DIZZINESS: 0
FREQUENCY: 0
ABDOMINAL PAIN: 0
COUGH: 0

## 2021-10-26 NOTE — NURSING NOTE
Prior to immunization administration, verified patients identity using patient s name and date of birth. Please see Immunization Activity for additional information.     Screening Questionnaire for Adult Immunization    Are you sick today?   No   Do you have allergies to medications, food, a vaccine component or latex?   No   Have you ever had a serious reaction after receiving a vaccination?   No   Do you have a long-term health problem with heart, lung, kidney, or metabolic disease (e.g., diabetes), asthma, a blood disorder, no spleen, complement component deficiency, a cochlear implant, or a spinal fluid leak?  Are you on long-term aspirin therapy?   No   Do you have cancer, leukemia, HIV/AIDS, or any other immune system problem?   No   Do you have a parent, brother, or sister with an immune system problem?   No   In the past 3 months, have you taken medications that affect  your immune system, such as prednisone, other steroids, or anticancer drugs; drugs for the treatment of rheumatoid arthritis, Crohn s disease, or psoriasis; or have you had radiation treatments?   No   Have you had a seizure, or a brain or other nervous system problem?   No   During the past year, have you received a transfusion of blood or blood    products, or been given immune (gamma) globulin or antiviral drug?   No   For women: Are you pregnant or is there a chance you could become       pregnant during the next month?   No   Have you received any vaccinations in the past 4 weeks?   No     Immunization questionnaire answers were all negative.        Per orders of Dr. Fonseca, injection of Pneumovax 23 given by Beba Jones MA. Patient instructed to remain in clinic for 15 minutes afterwards, and to report any adverse reaction to me immediately.       Screening performed by Beba Jones MA on 10/26/2021 at 5:44 PM.  Beba Jones MA on 10/26/2021 at 5:44 PM

## 2021-10-28 LAB
ANION GAP SERPL CALCULATED.3IONS-SCNC: 5 MMOL/L (ref 3–14)
BUN SERPL-MCNC: 17 MG/DL (ref 7–30)
CALCIUM SERPL-MCNC: 9.5 MG/DL (ref 8.5–10.1)
CHLORIDE BLD-SCNC: 105 MMOL/L (ref 94–109)
CHOLEST SERPL-MCNC: 243 MG/DL
CO2 SERPL-SCNC: 26 MMOL/L (ref 20–32)
CREAT SERPL-MCNC: 0.98 MG/DL (ref 0.66–1.25)
FASTING STATUS PATIENT QL REPORTED: NO
GFR SERPL CREATININE-BSD FRML MDRD: 84 ML/MIN/1.73M2
GLUCOSE BLD-MCNC: 97 MG/DL (ref 70–99)
HDLC SERPL-MCNC: 90 MG/DL
LDLC SERPL CALC-MCNC: 140 MG/DL
NONHDLC SERPL-MCNC: 153 MG/DL
POTASSIUM BLD-SCNC: 4.4 MMOL/L (ref 3.4–5.3)
PSA SERPL-MCNC: 1.09 UG/L (ref 0–4)
SODIUM SERPL-SCNC: 136 MMOL/L (ref 133–144)
TRIGL SERPL-MCNC: 67 MG/DL

## 2021-11-01 ENCOUNTER — TELEPHONE (OUTPATIENT)
Dept: FAMILY MEDICINE | Facility: CLINIC | Age: 61
End: 2021-11-01

## 2021-11-01 DIAGNOSIS — J45.41 MODERATE PERSISTENT ASTHMA WITH ACUTE EXACERBATION: ICD-10-CM

## 2021-11-01 RX ORDER — MONTELUKAST SODIUM 10 MG/1
TABLET ORAL
Qty: 90 TABLET | Refills: 3 | Status: SHIPPED | OUTPATIENT
Start: 2021-11-01 | End: 2022-10-14

## 2021-11-01 NOTE — TELEPHONE ENCOUNTER
[employer] insurance calling  Pt cannot get 90 day of Montelukast at [facility]  Can get at [facility] though  [employer] rep states she discussed this with pt and pt wants Montelukast only to [facility] [address]  Resent Rx to [facility] instead  Tish BRUNO RN

## 2021-11-20 ASSESSMENT — ASTHMA QUESTIONNAIRES: ACT_TOTALSCORE: 25

## 2021-12-09 NOTE — TELEPHONE ENCOUNTER
As,  PA denied.  Please advise on next step.  Thanks,  Belén Arriola RN    
Central Prior Authorization Team   Phone: [phone]    PA Initiation    Medication: VENTOLIN  (90 Base) MCG/ACT inhaler  Insurance Company: [employer] - Phone [phone] Fax [phone]  Pharmacy Filling the Rx: [id number] [place] [id number] [place] [address] & [facility]  Filling Pharmacy Phone: [phone]  Filling Pharmacy Fax:    Start Date: 8/16/2021      
He has asthma, moderate persistent.  He needs albuterol- I am unsure why PA is denied- please call and speak with pharmacologist or speak with MTM - to find what bronchodilator covered.    Thanks    
OSKARS,   See below description  Rx for Ventolin Inhaler NANCY but no documentation pt has failed regular Albuterol   Ok to Rx Albuterol instead or can appeal with reason   Tish BRUNO RN    
PRIOR AUTHORIZATION DENIED    Medication: VENTOLIN  (90 Base) MCG/ACT inhaler    Denial Date: 8/19/2021    Denial Rational:              Appeal Information:    If you would like to appeal, please supply P/A team with a letter of medical necessity with clinical reason.         
Please see message below  
Prior Authorization Retail Medication Request    Medication/Dose: VENTOLIN  (90 Base) MCG/ACT inhaler  ICD code (if different than what is on RX):    Previously Tried and Failed:    Rationale:      Insurance Name:  [employer] [phone]  Insurance ID:        Pharmacy Information (if different than what is on RX)  Name:  [facility]  Phone: [phone]        
Spoke to pharm and they said the NANCY did go through and to disregard PA request below.    Belén Arriola RN      
Yes ok please.   Thanks  
Altered Nutrition Related Lab Values

## 2021-12-24 NOTE — RESULT ENCOUNTER NOTE
-PSA (prostate specific antigen) test is normal.  This indicates a low likelihood of prostate cancer.  ADVISE: rechecking this in 1 year.  -LDL(bad) cholesterol level is elevated which can increase your heart disease risk.  A diet high in fat and simple carbohydrates, genetics and being overweight can contribute to this. ADVISE: exercising 150 minutes of aerobic exercise per week (30 minutes for 5 days per week or 50 minutes for 3 days per week are options) and eating a low saturated fat/low carbohydrate diet are helpful to improve this. In 12 months, you should recheck your fasting cholesterol panel by scheduling a lab-only appointment.  -Kidney function is normal (Cr, GFR), Sodium is normal, Potassium is normal, Calcium is normal, Glucose is normal.     Please keep us posted with questions or concerns .      Best Regards,    Luisa Fonseca MD  [facility]  [phone]

## 2022-01-22 ENCOUNTER — NURSE TRIAGE (OUTPATIENT)
Dept: NURSING | Facility: CLINIC | Age: 62
End: 2022-01-22

## 2022-01-22 NOTE — TELEPHONE ENCOUNTER
Patient is requesting a refill on his symbicort inhaler.   Triager informed patient that he has 11 refills on the medication.  Triage guidelines recommend to home care.  Caller verbalized and understands directives.  COVID 19 Nurse Triage Plan/Patient Instructions    Please be aware that novel coronavirus (COVID-19) may be circulating in the community. If you develop symptoms such as fever, cough, or SOB or if you have concerns about the presence of another infection including coronavirus (COVID-19), please contact your health care provider or visit [email or web address].     Disposition/Instructions    Home care recommended. Follow home care protocol based instructions.    Thank you for taking steps to prevent the spread of this virus.  o Limit your contact with others.  o Wear a simple mask to cover your cough.  o Wash your hands well and often.    Resources    [facility]: About COVID-19: [email or web address]    CDC: What to Do If You're Sick: www.cdc.gov/coronavirus/2019-ncov/about/steps-when-sick.html    CDC: Ending Home Isolation: www.cdc.gov/coronavirus/2019-ncov/hcp/disposition-in-home-patients.html     CDC: Caring for Someone: www.cdc.gov/coronavirus/2019-ncov/if-you-are-sick/care-for-someone.html     [facility]: Interim Guidance for Hospital Discharge to Home: [email or web address]/coronavirus/hcp/hospdischarge.pdf    [facility] clinical trials (COVID-19 research studies): [email or web address]     Below are the COVID-19 hotlines at the [facility] ([facility]). Interpreters are available.   o For health questions: Call [phone] or [phone] (7 a.m. to 7 p.m.)  o For questions about schools and childcare: Call [phone] or [phone] (7 a.m. to 7 p.m.)                     Reason for Disposition    Caller has medication question only, adult not sick, and triager answers question    Additional Information    Negative:  "Drug overdose and triager unable to answer question    Negative: Caller requesting information unrelated to medicine    Negative: Caller requesting a prescription for Strep throat and has a positive culture result    Negative: Rash while taking a medication or within 3 days of stopping it    Negative: Immunization reaction suspected    Negative: [1] Asthma and [2] having symptoms of asthma (cough, wheezing, etc.)    Negative: [1] Influenza symptoms AND [2] anti-viral med prescription request, such as Tamiflu    Negative: [1] Symptom of illness (e.g., headache, abdominal pain, earache, vomiting) AND [2] more than mild    Negative: MORE THAN A DOUBLE DOSE of a prescription or over-the-counter (OTC) drug    Negative: [1] DOUBLE DOSE (an extra dose or lesser amount) of over-the-counter (OTC) drug AND [2] any symptoms (e.g., dizziness, nausea, pain, sleepiness)    Negative: [1] DOUBLE DOSE (an extra dose or lesser amount) of prescription drug AND [2] any symptoms (e.g., dizziness, nausea, pain, sleepiness)    Negative: Took another person's prescription drug    Negative: [1] DOUBLE DOSE (an extra dose or lesser amount) of prescription drug AND [2] NO symptoms (Exception: a double dose of antibiotics)    Negative: Diabetes drug error or overdose (e.g., took wrong type of insulin or took extra dose)    Negative: [1] Request for URGENT new prescription or refill of \"essential\" medication (i.e., likelihood of harm to patient if not taken) AND [2] triager unable to fill per unit policy    Negative: [1] Prescription not at pharmacy AND [2] was prescribed by PCP recently    Negative: [1] Pharmacy calling with prescription questions AND [2] triager unable to answer question    Negative: [1] Caller has URGENT medication question about med that PCP or specialist prescribed AND [2] triager unable to answer question    Negative: [1] Caller has NON-URGENT medication question about med that PCP prescribed AND [2] triager unable to " answer question    Negative: [1] Caller requesting a NON-URGENT new prescription or refill AND [2] triager unable to refill per unit policy    Negative: [1] Caller has medication question about med not prescribed by PCP AND [2] triager unable to answer question (e.g., compatibility with other med, storage)    Negative: Caller requesting a CONTROLLED substance prescription refill (e.g., narcotics, ADHD medicines)    Negative: Caller wants to use a complementary or alternative medicine    Negative: [1] Prescription prescribed recently is not at pharmacy AND [2] triager has access to patient's EMR AND [3] prescription is recorded in the EMR    Negative: [1] DOUBLE DOSE (an extra dose or lesser amount) of over-the-counter (OTC) drug AND [2] NO symptoms    Negative: [1] DOUBLE DOSE (an extra dose or lesser amount) of antibiotic drug AND [2] NO symptoms    Protocols used: MEDICATION QUESTION CALL-A-

## 2022-02-07 DIAGNOSIS — J45.40 MODERATE PERSISTENT ASTHMA WITHOUT COMPLICATION: Primary | ICD-10-CM

## 2022-02-07 NOTE — TELEPHONE ENCOUNTER
Routing refill request to provider for review/approval because:  Drug not active on patient's medication list  Tish BRUNO RN

## 2022-02-07 NOTE — TELEPHONE ENCOUNTER
UP,    Patient was getting Symbicort refilled but it was $480. Pharm stated if pt asks PCP to order Advair Diskus this would be at no cost.     Alt med and pharm cued.    Pt stated please call once completed.   [phone]      Thanks,  JONATHAN Khoury  [facility]

## 2022-04-20 ENCOUNTER — MYC MEDICAL ADVICE (OUTPATIENT)
Dept: FAMILY MEDICINE | Facility: CLINIC | Age: 62
End: 2022-04-20

## 2022-05-11 ENCOUNTER — TELEPHONE (OUTPATIENT)
Dept: FAMILY MEDICINE | Facility: CLINIC | Age: 62
End: 2022-05-11
Payer: COMMERCIAL

## 2022-05-11 DIAGNOSIS — I10 ESSENTIAL HYPERTENSION, BENIGN: ICD-10-CM

## 2022-05-11 RX ORDER — LISINOPRIL 20 MG/1
20 TABLET ORAL DAILY
Qty: 90 TABLET | Refills: 1 | Status: SHIPPED | OUTPATIENT
Start: 2022-05-11 | End: 2022-12-16

## 2022-05-11 NOTE — TELEPHONE ENCOUNTER
Patient called.  Asking for Lisinopril to be sent to [facility] [address].  States [facility] will only refill #[facility], [facility]  Per insurance.    [facility] to [facility].  Abbie Zamora RN

## 2022-07-28 ENCOUNTER — LAB (OUTPATIENT)
Dept: URGENT CARE | Facility: URGENT CARE | Age: 62
End: 2022-07-28
Attending: INTERNAL MEDICINE

## 2022-07-28 ENCOUNTER — VIRTUAL VISIT (OUTPATIENT)
Dept: INTERNAL MEDICINE | Facility: CLINIC | Age: 62
End: 2022-07-28
Payer: COMMERCIAL

## 2022-07-28 DIAGNOSIS — I10 ESSENTIAL HYPERTENSION, BENIGN: ICD-10-CM

## 2022-07-28 DIAGNOSIS — J45.40 MODERATE PERSISTENT ASTHMA WITHOUT COMPLICATION: ICD-10-CM

## 2022-07-28 DIAGNOSIS — R53.83 FATIGUE, UNSPECIFIED TYPE: Primary | ICD-10-CM

## 2022-07-28 DIAGNOSIS — R53.83 FATIGUE, UNSPECIFIED TYPE: ICD-10-CM

## 2022-07-28 PROCEDURE — 99214 OFFICE O/P EST MOD 30 MIN: CPT | Mod: TEL | Performed by: INTERNAL MEDICINE

## 2022-07-28 PROCEDURE — U0003 INFECTIOUS AGENT DETECTION BY NUCLEIC ACID (DNA OR RNA); SEVERE ACUTE RESPIRATORY SYNDROME CORONAVIRUS 2 (SARS-COV-2) (CORONAVIRUS DISEASE [COVID-19]), AMPLIFIED PROBE TECHNIQUE, MAKING USE OF HIGH THROUGHPUT TECHNOLOGIES AS DESCRIBED BY CMS-2020-01-R: HCPCS

## 2022-07-28 PROCEDURE — U0005 INFEC AGEN DETEC AMPLI PROBE: HCPCS

## 2022-07-28 RX ORDER — OXYCODONE AND ACETAMINOPHEN 5; 325 MG/1; MG/1
1-2 TABLET ORAL
COMMUNITY
Start: 2021-06-03 | End: 2022-07-28

## 2022-07-28 RX ORDER — LIDOCAINE 50 MG/G
PATCH TOPICAL
COMMUNITY
Start: 2021-06-03 | End: 2022-07-28

## 2022-07-28 RX ORDER — FENTANYL 25 UG/1
1 PATCH TRANSDERMAL
COMMUNITY
Start: 2021-06-03 | End: 2022-07-28

## 2022-07-28 NOTE — PATIENT INSTRUCTIONS
PCR for COVID    CBC, comprehensive profile and UA to consider other diseases including tickborne diseases    Follow-up based on results

## 2022-07-29 LAB — SARS-COV-2 RNA RESP QL NAA+PROBE: NEGATIVE

## 2022-08-01 ENCOUNTER — LAB (OUTPATIENT)
Dept: LAB | Facility: CLINIC | Age: 62
End: 2022-08-01

## 2022-08-01 DIAGNOSIS — R53.83 FATIGUE, UNSPECIFIED TYPE: ICD-10-CM

## 2022-08-01 LAB
ALBUMIN UR-MCNC: NEGATIVE MG/DL
APPEARANCE UR: CLEAR
BASOPHILS # BLD AUTO: 0.1 10E3/UL (ref 0–0.2)
BASOPHILS NFR BLD AUTO: 1 %
BILIRUB UR QL STRIP: NEGATIVE
COLOR UR AUTO: YELLOW
EOSINOPHIL # BLD AUTO: 0.2 10E3/UL (ref 0–0.7)
EOSINOPHIL NFR BLD AUTO: 2 %
ERYTHROCYTE [DISTWIDTH] IN BLOOD BY AUTOMATED COUNT: 13.1 % (ref 10–15)
GLUCOSE UR STRIP-MCNC: NEGATIVE MG/DL
HCT VFR BLD AUTO: 41.8 % (ref 40–53)
HGB BLD-MCNC: 14.4 G/DL (ref 13.3–17.7)
HGB UR QL STRIP: NEGATIVE
KETONES UR STRIP-MCNC: NEGATIVE MG/DL
LEUKOCYTE ESTERASE UR QL STRIP: NEGATIVE
LYMPHOCYTES # BLD AUTO: 1.6 10E3/UL (ref 0.8–5.3)
LYMPHOCYTES NFR BLD AUTO: 22 %
MCH RBC QN AUTO: 30.1 PG (ref 26.5–33)
MCHC RBC AUTO-ENTMCNC: 34.4 G/DL (ref 31.5–36.5)
MCV RBC AUTO: 87 FL (ref 78–100)
MONOCYTES # BLD AUTO: 0.8 10E3/UL (ref 0–1.3)
MONOCYTES NFR BLD AUTO: 11 %
NEUTROPHILS # BLD AUTO: 4.5 10E3/UL (ref 1.6–8.3)
NEUTROPHILS NFR BLD AUTO: 64 %
NITRATE UR QL: NEGATIVE
PH UR STRIP: 5.5 [PH] (ref 5–7)
PLATELET # BLD AUTO: 333 10E3/UL (ref 150–450)
RBC # BLD AUTO: 4.79 10E6/UL (ref 4.4–5.9)
SP GR UR STRIP: 1.02 (ref 1–1.03)
UROBILINOGEN UR STRIP-ACNC: 0.2 E.U./DL
WBC # BLD AUTO: 7 10E3/UL (ref 4–11)

## 2022-08-01 PROCEDURE — 81003 URINALYSIS AUTO W/O SCOPE: CPT

## 2022-08-01 PROCEDURE — 36415 COLL VENOUS BLD VENIPUNCTURE: CPT

## 2022-08-01 PROCEDURE — 80053 COMPREHEN METABOLIC PANEL: CPT

## 2022-08-01 PROCEDURE — 85025 COMPLETE CBC W/AUTO DIFF WBC: CPT

## 2022-08-02 LAB
ALBUMIN SERPL-MCNC: 4.1 G/DL (ref 3.4–5)
ALP SERPL-CCNC: 75 U/L (ref 40–150)
ALT SERPL W P-5'-P-CCNC: 33 U/L (ref 0–70)
ANION GAP SERPL CALCULATED.3IONS-SCNC: 5 MMOL/L (ref 3–14)
AST SERPL W P-5'-P-CCNC: 20 U/L (ref 0–45)
BILIRUB SERPL-MCNC: 0.5 MG/DL (ref 0.2–1.3)
BUN SERPL-MCNC: 11 MG/DL (ref 7–30)
CALCIUM SERPL-MCNC: 9.1 MG/DL (ref 8.5–10.1)
CHLORIDE BLD-SCNC: 106 MMOL/L (ref 94–109)
CO2 SERPL-SCNC: 28 MMOL/L (ref 20–32)
CREAT SERPL-MCNC: 0.86 MG/DL (ref 0.66–1.25)
GFR SERPL CREATININE-BSD FRML MDRD: >90 ML/MIN/1.73M2
GLUCOSE BLD-MCNC: 94 MG/DL (ref 70–99)
POTASSIUM BLD-SCNC: 4.3 MMOL/L (ref 3.4–5.3)
PROT SERPL-MCNC: 6.9 G/DL (ref 6.8–8.8)
SODIUM SERPL-SCNC: 139 MMOL/L (ref 133–144)

## 2022-10-14 DIAGNOSIS — J45.41 MODERATE PERSISTENT ASTHMA WITH ACUTE EXACERBATION: ICD-10-CM

## 2022-10-14 RX ORDER — MONTELUKAST SODIUM 10 MG/1
TABLET ORAL
Qty: 90 TABLET | Refills: 0 | Status: SHIPPED | OUTPATIENT
Start: 2022-10-14 | End: 2023-01-14

## 2022-10-14 NOTE — TELEPHONE ENCOUNTER
Prescription approved per [facility] Refill Protocol.  Due for physical Oct/Nov 2022    Tish BRUNO RN

## 2022-11-19 ENCOUNTER — HEALTH MAINTENANCE LETTER (OUTPATIENT)
Age: 62
End: 2022-11-19

## 2022-12-15 NOTE — TELEPHONE ENCOUNTER
Pt needed an appt after 3:00 pm; scheduled on 09/25 @ 3:40.    Denise Behrendt Specialty CSS     Saloni Youngblood is a 54 year old female presenting to [facility] for labs and port flush.     Does the Patient have a central line?  Yes: See Invasive (Oncology) Lines Flowsheet for CVAD documentation.      stable

## 2022-12-16 DIAGNOSIS — I10 ESSENTIAL HYPERTENSION, BENIGN: ICD-10-CM

## 2022-12-16 DIAGNOSIS — J45.41 MODERATE PERSISTENT ASTHMA WITH ACUTE EXACERBATION: ICD-10-CM

## 2022-12-16 RX ORDER — ALBUTEROL SULFATE 90 UG/1
1-2 AEROSOL, METERED RESPIRATORY (INHALATION) EVERY 4 HOURS PRN
Qty: 8.5 G | Refills: 0 | Status: SHIPPED | OUTPATIENT
Start: 2022-12-16 | End: 2023-01-14

## 2022-12-16 RX ORDER — LISINOPRIL 20 MG/1
20 TABLET ORAL DAILY
Qty: 30 TABLET | Refills: 0 | Status: SHIPPED | OUTPATIENT
Start: 2022-12-16 | End: 2023-01-03

## 2022-12-16 NOTE — TELEPHONE ENCOUNTER
P.N. DOD,     Albuterol:   Routing refill request to provider for review/approval because:  Patient needs to be seen because it has been more than 1 year since last office visit.  ACT not updated      Lisinopril:   Routing refill request to provider for review/approval because:  Patient needs to be seen because it has been more than 1 year since last office visit.  BP out of range    Does not sound short of breath on phone  Would like rescue med as soon as possible, in case  Will call back to complete ACT questionnaire and update home BP  Patient states he is out of medication  Is between insurance  Signing up now with new job  Will schedule med check appt once he gets work schedule  Notified cost plus may be a good option   Wondering if OTC rescue inhaler may be cheaper/effective?   Requesting albuterol  Also pended lisinopril   Angela GUTIERRES RN

## 2022-12-17 ENCOUNTER — HEALTH MAINTENANCE LETTER (OUTPATIENT)
Age: 62
End: 2022-12-17

## 2022-12-30 ENCOUNTER — MYC MEDICAL ADVICE (OUTPATIENT)
Dept: FAMILY MEDICINE | Facility: CLINIC | Age: 62
End: 2022-12-30

## 2023-01-14 ENCOUNTER — NURSE TRIAGE (OUTPATIENT)
Dept: NURSING | Facility: CLINIC | Age: 63
End: 2023-01-14

## 2023-01-14 DIAGNOSIS — J45.40 MODERATE PERSISTENT ASTHMA WITHOUT COMPLICATION: ICD-10-CM

## 2023-01-14 DIAGNOSIS — J45.41 MODERATE PERSISTENT ASTHMA WITH ACUTE EXACERBATION: ICD-10-CM

## 2023-01-14 DIAGNOSIS — I10 ESSENTIAL HYPERTENSION, BENIGN: ICD-10-CM

## 2023-01-14 RX ORDER — LISINOPRIL 20 MG/1
20 TABLET ORAL DAILY
Qty: 90 TABLET | Refills: 0 | Status: SHIPPED | OUTPATIENT
Start: 2023-01-14 | End: 2023-02-15

## 2023-01-14 RX ORDER — ALBUTEROL SULFATE 90 UG/1
1-2 AEROSOL, METERED RESPIRATORY (INHALATION) EVERY 4 HOURS PRN
Qty: 8.5 G | Refills: 2 | Status: SHIPPED | OUTPATIENT
Start: 2023-01-14 | End: 2023-02-15

## 2023-01-14 RX ORDER — MONTELUKAST SODIUM 10 MG/1
TABLET ORAL
Qty: 90 TABLET | Refills: 0 | Status: SHIPPED | OUTPATIENT
Start: 2023-01-14 | End: 2023-02-15

## 2023-01-14 RX ORDER — LISINOPRIL 20 MG/1
20 TABLET ORAL DAILY
Qty: 15 TABLET | Refills: 0 | Status: SHIPPED | OUTPATIENT
Start: 2023-01-14 | End: 2023-01-14

## 2023-01-14 RX ORDER — FLUTICASONE PROPIONATE AND SALMETEROL 250; 50 UG/1; UG/1
1 POWDER RESPIRATORY (INHALATION) EVERY 12 HOURS
Qty: 180 EACH | Refills: 0 | Status: SHIPPED | OUTPATIENT
Start: 2023-01-14 | End: 2023-02-15

## 2023-01-14 NOTE — TELEPHONE ENCOUNTER
Patient had a lapse in insurance coverage and has run out of all of his medications. Patient now has insurance again and is asking for refills of all of his medications. Patient takes lisinopril 20 mg, montelukast 10 mg, albuterol  mcg, fluticasone-salmeterol 250-50 mcg/ACT inhaler.  Patient reports he is feeling well but his blood pressure is elevated. Blood pressure this morning 160/100. Patient worried that without the medications his blood pressure will increase. Denies any symptoms.   Recent refill of lisinopril for 15 tablets on 1/3/2023 patient did not  due to lack of insurance. Transferred to local pharmacy for patient to  today. Informed of Augmenix if insurance not active yet.   Patient asking for refills of other medications to get him to his next appointment. Transferred patient to scheduling for PCP follow up within 3 days for elevated blood pressure. Patient directed to bring his home blood pressure cuff to appointment for a comparison. Care advice given.     Page to on call provider Dr. Joel Wegener at 10:48 am regarding essential medication refill.   Dr. Wegener calling back at 11:05 am and giving verbal order to refill all 4 medications for a 3 month supply with current dosing and instructions.   Call to patient to inform of refills. [facility] is preferred for all medications.   Care advice to check blood pressure after taking lisinopril and to call if blood pressure continues to be high or if he develops any symptoms. An earlier appointment may be needed before the physical scheduled next month.   Rebeca Irizarry RN   01/14/23 11:14 AM  [facility] Nurse Advisor        Reason for Disposition    Systolic BP  >= 160 OR Diastolic >= 100    Additional Information    Negative: Difficult to awaken or acting confused (e.g., disoriented, slurred speech)    Negative: SEVERE difficulty breathing (e.g., struggling for each breath, speaks in single words)    Negative: [1] Weakness of  the face, arm or leg on one side of the body AND [2] new-onset    Negative: [1] Numbness (i.e., loss of sensation) of the face, arm or leg on one side of the body AND [2] new-onset    Negative: [1] Chest pain lasts > 5 minutes AND [2] history of heart disease (i.e., heart attack, bypass surgery, angina, angioplasty, CHF)    Negative: [1] Chest pain AND [2] took nitrogylcerin AND [3] pain was not relieved    Negative: Sounds like a life-threatening emergency to the triager    Negative: Symptom is main concern (e.g., headache, chest pain)    Negative: Low blood pressure is main concern    Negative: [1] Systolic BP  >= 160 OR Diastolic >= 100 AND [2] cardiac or neurologic symptoms (e.g., chest pain, difficulty breathing, unsteady gait, blurred vision)    Negative: [1] Pregnant 20 or more weeks (or postpartum < 6 weeks) AND [2] new hand or face swelling    Negative: [1] Pregnant 20 or more weeks (or postpartum < 6 weeks) AND [2] Systolic BP >= 160 OR Diastolic >= 100    Negative: [1] Systolic BP  >= 200 OR Diastolic >= 120 AND [2] having NO cardiac or neurologic symptoms    Negative: [1] Pregnant 20 or more weeks (or postpartum < 6 weeks) AND [2] Systolic BP  >= 140 OR Diastolic >= 90    Negative: [1] Systolic BP  >= 180 OR Diastolic >= 110 AND [2] missed most recent dose of blood pressure medication    Negative: Systolic BP  >= 180 OR Diastolic >= 110    Negative: Ran out of BP medications    Protocols used: BLOOD PRESSURE - HIGH-A-

## 2023-02-15 ENCOUNTER — OFFICE VISIT (OUTPATIENT)
Dept: FAMILY MEDICINE | Facility: CLINIC | Age: 63
End: 2023-02-15
Payer: COMMERCIAL

## 2023-02-15 VITALS
RESPIRATION RATE: 16 BRPM | TEMPERATURE: 98.6 F | SYSTOLIC BLOOD PRESSURE: 128 MMHG | HEART RATE: 80 BPM | WEIGHT: 169 LBS | BODY MASS INDEX: 24.2 KG/M2 | HEIGHT: 70 IN | DIASTOLIC BLOOD PRESSURE: 84 MMHG | OXYGEN SATURATION: 99 %

## 2023-02-15 DIAGNOSIS — I10 ESSENTIAL HYPERTENSION, BENIGN: ICD-10-CM

## 2023-02-15 DIAGNOSIS — N40.0 ENLARGED PROSTATE ON RECTAL EXAMINATION: ICD-10-CM

## 2023-02-15 DIAGNOSIS — Z00.00 ROUTINE GENERAL MEDICAL EXAMINATION AT A HEALTH CARE FACILITY: Primary | ICD-10-CM

## 2023-02-15 DIAGNOSIS — Z11.4 SCREENING FOR HIV (HUMAN IMMUNODEFICIENCY VIRUS): ICD-10-CM

## 2023-02-15 DIAGNOSIS — J45.40 MODERATE PERSISTENT ASTHMA WITHOUT COMPLICATION: ICD-10-CM

## 2023-02-15 DIAGNOSIS — J45.41 MODERATE PERSISTENT ASTHMA WITH ACUTE EXACERBATION: ICD-10-CM

## 2023-02-15 LAB
ERYTHROCYTE [DISTWIDTH] IN BLOOD BY AUTOMATED COUNT: 12.6 % (ref 10–15)
HBA1C MFR BLD: 5.4 % (ref 0–5.6)
HCT VFR BLD AUTO: 43.4 % (ref 40–53)
HGB BLD-MCNC: 15 G/DL (ref 13.3–17.7)
MCH RBC QN AUTO: 30 PG (ref 26.5–33)
MCHC RBC AUTO-ENTMCNC: 34.6 G/DL (ref 31.5–36.5)
MCV RBC AUTO: 87 FL (ref 78–100)
PLATELET # BLD AUTO: 376 10E3/UL (ref 150–450)
RBC # BLD AUTO: 5 10E6/UL (ref 4.4–5.9)
WBC # BLD AUTO: 7.6 10E3/UL (ref 4–11)

## 2023-02-15 PROCEDURE — 90750 HZV VACC RECOMBINANT IM: CPT | Performed by: FAMILY MEDICINE

## 2023-02-15 PROCEDURE — 99396 PREV VISIT EST AGE 40-64: CPT | Mod: 25 | Performed by: FAMILY MEDICINE

## 2023-02-15 PROCEDURE — 90677 PCV20 VACCINE IM: CPT | Performed by: FAMILY MEDICINE

## 2023-02-15 PROCEDURE — G0103 PSA SCREENING: HCPCS | Performed by: FAMILY MEDICINE

## 2023-02-15 PROCEDURE — 83036 HEMOGLOBIN GLYCOSYLATED A1C: CPT | Performed by: FAMILY MEDICINE

## 2023-02-15 PROCEDURE — 90471 IMMUNIZATION ADMIN: CPT | Performed by: FAMILY MEDICINE

## 2023-02-15 PROCEDURE — 90472 IMMUNIZATION ADMIN EACH ADD: CPT | Performed by: FAMILY MEDICINE

## 2023-02-15 PROCEDURE — 85027 COMPLETE CBC AUTOMATED: CPT | Performed by: FAMILY MEDICINE

## 2023-02-15 PROCEDURE — 80053 COMPREHEN METABOLIC PANEL: CPT | Performed by: FAMILY MEDICINE

## 2023-02-15 PROCEDURE — 36415 COLL VENOUS BLD VENIPUNCTURE: CPT | Performed by: FAMILY MEDICINE

## 2023-02-15 PROCEDURE — 87389 HIV-1 AG W/HIV-1&-2 AB AG IA: CPT | Performed by: FAMILY MEDICINE

## 2023-02-15 RX ORDER — FLUTICASONE PROPIONATE AND SALMETEROL 250; 50 UG/1; UG/1
1 POWDER RESPIRATORY (INHALATION) EVERY 12 HOURS
Qty: 180 EACH | Refills: 3 | Status: SHIPPED | OUTPATIENT
Start: 2023-02-15 | End: 2024-02-06

## 2023-02-15 RX ORDER — LISINOPRIL 20 MG/1
20 TABLET ORAL DAILY
Qty: 90 TABLET | Refills: 3 | Status: SHIPPED | OUTPATIENT
Start: 2023-02-15 | End: 2024-02-05

## 2023-02-15 RX ORDER — MONTELUKAST SODIUM 10 MG/1
TABLET ORAL
Qty: 90 TABLET | Refills: 3 | Status: SHIPPED | OUTPATIENT
Start: 2023-02-15 | End: 2024-02-06

## 2023-02-15 RX ORDER — ALBUTEROL SULFATE 90 UG/1
1-2 AEROSOL, METERED RESPIRATORY (INHALATION) EVERY 4 HOURS PRN
Qty: 8.5 G | Refills: 2 | Status: SHIPPED | OUTPATIENT
Start: 2023-02-15 | End: 2024-02-06

## 2023-02-15 ASSESSMENT — ENCOUNTER SYMPTOMS
PALPITATIONS: 0
ARTHRALGIAS: 0
CONSTIPATION: 0
HEMATURIA: 0
SORE THROAT: 0
DIARRHEA: 0
NERVOUS/ANXIOUS: 0
ABDOMINAL PAIN: 0
DYSURIA: 0
NAUSEA: 0
HEADACHES: 0
CHILLS: 0
DIZZINESS: 0
FEVER: 0
COUGH: 0
EYE PAIN: 0
HEMATOCHEZIA: 0
WEAKNESS: 0
JOINT SWELLING: 0
SHORTNESS OF BREATH: 0
MYALGIAS: 0
FREQUENCY: 1
HEARTBURN: 0
PARESTHESIAS: 0

## 2023-02-15 ASSESSMENT — ASTHMA QUESTIONNAIRES
QUESTION_4 LAST FOUR WEEKS HOW OFTEN HAVE YOU USED YOUR RESCUE INHALER OR NEBULIZER MEDICATION (SUCH AS ALBUTEROL): ONCE A WEEK OR LESS
QUESTION_5 LAST FOUR WEEKS HOW WOULD YOU RATE YOUR ASTHMA CONTROL: WELL CONTROLLED
QUESTION_2 LAST FOUR WEEKS HOW OFTEN HAVE YOU HAD SHORTNESS OF BREATH: ONCE OR TWICE A WEEK
ACT_TOTALSCORE: 22
ACT_TOTALSCORE: 22
QUESTION_1 LAST FOUR WEEKS HOW MUCH OF THE TIME DID YOUR ASTHMA KEEP YOU FROM GETTING AS MUCH DONE AT WORK, SCHOOL OR AT HOME: NONE OF THE TIME
QUESTION_3 LAST FOUR WEEKS HOW OFTEN DID YOUR ASTHMA SYMPTOMS (WHEEZING, COUGHING, SHORTNESS OF BREATH, CHEST TIGHTNESS OR PAIN) WAKE YOU UP AT NIGHT OR EARLIER THAN USUAL IN THE MORNING: NOT AT ALL

## 2023-02-15 ASSESSMENT — PAIN SCALES - GENERAL: PAINLEVEL: NO PAIN (0)

## 2023-02-15 NOTE — NURSING NOTE
Prior to immunization administration, verified patients identity using patient s name and date of birth. Please see Immunization Activity for additional information.     Screening Questionnaire for Adult Immunization    Are you sick today?   No   Do you have allergies to medications, food, a vaccine component or latex?   No   Have you ever had a serious reaction after receiving a vaccination?   No   Do you have a long-term health problem with heart, lung, kidney, or metabolic disease (e.g., diabetes), asthma, a blood disorder, no spleen, complement component deficiency, a cochlear implant, or a spinal fluid leak?  Are you on long-term aspirin therapy?   No   Do you have cancer, leukemia, HIV/AIDS, or any other immune system problem?   No   Do you have a parent, brother, or sister with an immune system problem?   No   In the past 3 months, have you taken medications that affect  your immune system, such as prednisone, other steroids, or anticancer drugs; drugs for the treatment of rheumatoid arthritis, Crohn s disease, or psoriasis; or have you had radiation treatments?   No   Have you had a seizure, or a brain or other nervous system problem?   No   During the past year, have you received a transfusion of blood or blood    products, or been given immune (gamma) globulin or antiviral drug?   No   For women: Are you pregnant or is there a chance you could become       pregnant during the next month?   No   Have you received any vaccinations in the past 4 weeks?   No     Immunization questionnaire answers were all negative.        Per orders of Dr. Valenzuela, injection of pcv 20 and shingrix given by Carrie Miner. Patient instructed to remain in clinic for 15 minutes afterwards, and to report any adverse reaction to me immediately.       Screening performed by Carrie Miner on 2/15/2023 at 3:36 PM.

## 2023-02-15 NOTE — PROGRESS NOTES
SUBJECTIVE:   CC: Garcia is an 62 year old who presents for preventative health visit.   Patient has been advised of split billing requirements and indicates understanding: Yes  Healthy Habits:     Getting at least 3 servings of Calcium per day:  NO    Bi-annual eye exam:  Yes    Dental care twice a year:  Yes    Sleep apnea or symptoms of sleep apnea:  None    Diet:  Regular (no restrictions)    Frequency of exercise:  4-5 days/week (from work)    Duration of exercise:  30-45 minutes    Taking medications regularly:  Yes    Medication side effects:  None    PHQ-2 Total Score: 0    Additional concerns today:  Yes (colonoscopy referral, freq. urination, more urgent, leaking urine, retention, no pain)    Polyuria and urgency. Sometimes early leakage. Sometimes it is occasional incomplete emptying of the bladder.  Drinks 40 oz of coffee daily.  Symptoms present for the the last few years.     He has regular bowel movements.     PSA   Date Value Ref Range Status   07/06/2012 0.80 0 - 4 ug/L Final     Prostate Specific Antigen Screen   Date Value Ref Range Status   10/26/2021 1.09 0.00 - 4.00 ug/L Final     Hypertension Follow-up    Do you check your blood pressure regularly outside of the clinic? Yes     Are you following a low salt diet? No    Are your blood pressures ever more than 140 on the top number (systolic) OR more   than 90 on the bottom number (diastolic), for example 140/90? Yes    Asthma Follow-Up  Was ACT completed today?    ACT Total Scores 2/15/2023   ACT TOTAL SCORE -   ASTHMA ER VISITS -   ASTHMA HOSPITALIZATIONS -   ACT TOTAL SCORE (Goal Greater than or Equal to 20) 22   In the past 12 months, how many times did you visit the emergency room for your asthma without being admitted to the hospital? 0   In the past 12 months, how many times were you hospitalized overnight because of your asthma? 0      How many days per week do you miss taking your asthma controller medication?  1 time a week     Please  describe any recent triggers for your asthma: snowmobiles and bad air quality     Have you had any Emergency Room Visits, Urgent Care Visits, or Hospital Admissions since your last office visit?  No      Today's PHQ-2 Score:   PHQ-2 ( 1999 Pfizer) 2/15/2023   Q1: Little interest or pleasure in doing things 0   Q2: Feeling down, depressed or hopeless 0   PHQ-2 Score 0   PHQ-2 Total Score (12-17 Years)- Positive if 3 or more points; Administer PHQ-A if positive -   Q1: Little interest or pleasure in doing things Not at all   Q2: Feeling down, depressed or hopeless Not at all   PHQ-2 Score 0           Social History     Tobacco Use     Smoking status: Never     Smokeless tobacco: Former     Types: Chew   Substance Use Topics     Alcohol use: Yes     Comment: occassionally     If you drink alcohol do you typically have >3 drinks per day or >7 drinks per week? No    Alcohol Use 2/15/2023   Prescreen: >3 drinks/day or >7 drinks/week? No   Prescreen: >3 drinks/day or >7 drinks/week? -   No flowsheet data found.    Last PSA:   PSA   Date Value Ref Range Status   07/06/2012 0.80 0 - 4 ug/L Final     Prostate Specific Antigen Screen   Date Value Ref Range Status   10/26/2021 1.09 0.00 - 4.00 ug/L Final       Reviewed orders with patient. Reviewed health maintenance and updated orders accordingly - No  Lab work is in process    Reviewed and updated as needed this visit by clinical staff   Tobacco  Allergies  Meds  Problems  Med Hx  Surg Hx  Fam Hx          Reviewed and updated as needed this visit by Provider   Tobacco  Allergies  Meds  Problems  Med Hx  Surg Hx  Fam Hx             Review of Systems   Constitutional: Negative for chills and fever.   HENT: Negative for congestion, ear pain, hearing loss and sore throat.    Eyes: Negative for pain and visual disturbance.   Respiratory: Negative for cough and shortness of breath.    Cardiovascular: Negative for chest pain, palpitations and peripheral edema.  "  Gastrointestinal: Negative for abdominal pain, constipation, diarrhea, heartburn, hematochezia and nausea.   Genitourinary: Positive for frequency and urgency. Negative for dysuria, genital sores, hematuria, impotence and penile discharge.   Musculoskeletal: Negative for arthralgias, joint swelling and myalgias.   Skin: Negative for rash.   Neurological: Negative for dizziness, weakness, headaches and paresthesias.   Psychiatric/Behavioral: Negative for mood changes. The patient is not nervous/anxious.      OBJECTIVE:   /84 (BP Location: Left arm, Patient Position: Sitting, Cuff Size: Adult Regular)   Pulse 80   Temp 98.6  F (37  C) (Temporal)   Resp 16   Ht 1.772 m (5' 9.75\")   Wt 76.7 kg (169 lb)   SpO2 99%   BMI 24.42 kg/m      Physical Exam  GENERAL: healthy, alert and no distress  EYES: Eyes grossly normal to inspection, PERRL and conjunctivae and sclerae normal  HENT: ear canals and TM's normal, nose and mouth without ulcers or lesions  NECK: no adenopathy, no asymmetry, masses, or scars and thyroid normal to palpation  RESP: lungs clear to auscultation - no rales, rhonchi or wheezes  CV: regular rate and rhythm, normal S1 S2, no S3 or S4, no murmur, click or rub, no peripheral edema and peripheral pulses strong  ABDOMEN: soft, nontender, no hepatosplenomegaly, no masses and bowel sounds normal  RECTAL: normal sphincter tone, single external skin tag noted.  Small internal hemorrhoid extending from the anus.  Mild enlargement of the prostate no palpable nodules or masses noted.  MS: no gross musculoskeletal defects noted, no edema  SKIN: no suspicious lesions or rashes  NEURO: Normal strength and tone, mentation intact and speech normal  PSYCH: mentation appears normal, affect normal/bright    Diagnostic Test Results:  Labs reviewed in Epic  Results for orders placed or performed in visit on 02/15/23 (from the past 24 hour(s))   CBC with platelets   Result Value Ref Range    WBC Count 7.6 4.0 - " 11.0 10e3/uL    RBC Count 5.00 4.40 - 5.90 10e6/uL    Hemoglobin 15.0 13.3 - 17.7 g/dL    Hematocrit 43.4 40.0 - 53.0 %    MCV 87 78 - 100 fL    MCH 30.0 26.5 - 33.0 pg    MCHC 34.6 31.5 - 36.5 g/dL    RDW 12.6 10.0 - 15.0 %    Platelet Count 376 150 - 450 10e3/uL   Hemoglobin A1c   Result Value Ref Range    Hemoglobin A1C 5.4 0.0 - 5.6 %       ASSESSMENT/PLAN:   Kenney was seen today for physical.    Diagnoses and all orders for this visit:    Routine general medical examination at a health care facility  -     PSA, screen; Future  -     Cancel: Lipid panel reflex to direct LDL Fasting; Future  -     Comprehensive metabolic panel; Future  -     CBC with platelets; Future  -     Hemoglobin A1c; Future  -     Lipid panel reflex to direct LDL Fasting; Future    Screening for HIV (human immunodeficiency virus)  -     HIV Antigen Antibody Combo; Future    Essential hypertension, benign  -     lisinopril (ZESTRIL) 20 MG tablet; Take 1 tablet (20 mg) by mouth daily    Moderate persistent asthma without complication  -     fluticasone-salmeterol (ADVAIR DISKUS) 250-50 MCG/ACT inhaler; Inhale 1 puff into the lungs every 12 hours    Moderate persistent asthma with acute exacerbation  -     montelukast (SINGULAIR) 10 MG tablet; TAKE 1 TABLET(10 MG) BY MOUTH AT BEDTIME  -     albuterol (PROAIR HFA/PROVENTIL HFA/VENTOLIN HFA) 108 (90 Base) MCG/ACT inhaler; Inhale 1-2 puffs into the lungs every 4 hours as needed for shortness of breath or wheezing    Enlarged prostate on rectal examination  -Mild enlargement of the prostate.  Patient also has lower urinary tract symptoms.  PSA is currently pending.   - If PSA is normal, patient can be started on Flomax for symptomatic relief.  He does not desire to start Flomax today.    Other orders  -     REVIEW OF HEALTH MAINTENANCE PROTOCOL ORDERS  -     Pneumococcal 20 Valent Conjugate (Prevnar 20)  -     ZOSTER VACCINE RECOMBINANT ADJUVANTED IM NJX    Patient has been advised of split  billing requirements and indicates understanding: Yes      COUNSELING:   Reviewed preventive health counseling, as reflected in patient instructions       Regular exercise       Healthy diet/nutrition    He reports that he has never smoked. He has quit using smokeless tobacco.  His smokeless tobacco use included chew.    Navi Valenzuela MD  [facility]

## 2023-02-16 LAB
ALBUMIN SERPL BCG-MCNC: 4.7 G/DL (ref 3.5–5.2)
ALP SERPL-CCNC: 68 U/L (ref 40–129)
ALT SERPL W P-5'-P-CCNC: 26 U/L (ref 10–50)
ANION GAP SERPL CALCULATED.3IONS-SCNC: 13 MMOL/L (ref 7–15)
AST SERPL W P-5'-P-CCNC: 26 U/L (ref 10–50)
BILIRUB SERPL-MCNC: 0.4 MG/DL
BUN SERPL-MCNC: 18.2 MG/DL (ref 8–23)
CALCIUM SERPL-MCNC: 9.5 MG/DL (ref 8.8–10.2)
CHLORIDE SERPL-SCNC: 101 MMOL/L (ref 98–107)
CREAT SERPL-MCNC: 0.96 MG/DL (ref 0.67–1.17)
DEPRECATED HCO3 PLAS-SCNC: 21 MMOL/L (ref 22–29)
GFR SERPL CREATININE-BSD FRML MDRD: 90 ML/MIN/1.73M2
GLUCOSE SERPL-MCNC: 103 MG/DL (ref 70–99)
HIV 1+2 AB+HIV1 P24 AG SERPL QL IA: NONREACTIVE
POTASSIUM SERPL-SCNC: 4.3 MMOL/L (ref 3.4–5.3)
PROT SERPL-MCNC: 7 G/DL (ref 6.4–8.3)
PSA SERPL-MCNC: 0.93 NG/ML (ref 0–4.5)
SODIUM SERPL-SCNC: 135 MMOL/L (ref 136–145)

## 2023-02-16 NOTE — RESULT ENCOUNTER NOTE
Dear Garcia,   Here are your recent results. All results are within normal limits.    Best Regards   Navi Valenzuela MD

## 2023-04-06 ENCOUNTER — NURSE TRIAGE (OUTPATIENT)
Dept: FAMILY MEDICINE | Facility: CLINIC | Age: 63
End: 2023-04-06

## 2023-04-06 ENCOUNTER — VIRTUAL VISIT (OUTPATIENT)
Dept: INTERNAL MEDICINE | Facility: CLINIC | Age: 63
End: 2023-04-06
Payer: COMMERCIAL

## 2023-04-06 DIAGNOSIS — U07.1 INFECTION DUE TO 2019 NOVEL CORONAVIRUS: ICD-10-CM

## 2023-04-06 PROCEDURE — 99213 OFFICE O/P EST LOW 20 MIN: CPT | Mod: CS | Performed by: INTERNAL MEDICINE

## 2023-04-06 NOTE — PROGRESS NOTES
Garcia is a 63 year old who is being evaluated via a billable video visit.      How would you like to obtain your AVS? MyChart  If the video visit is dropped, the invitation should be resent by: Text to cell phone: [phone]  Will anyone else be joining your video visit? No      Assessment & Plan     Infection due to 2019 novel coronavirus  Mild symptoms, testing positive yesterday.  We discussed the pros and cons of paxlovid. He is low risk and so the indication is not strong.  He decided to have it 'run its course'.  He will follow up if high fever, or any dyspnea should develop.  We discussed rest, fluids, some activity, and avoiding contact with at risk individuals for 5 days.  He is off work.     John Taylor MD  [facility]   Garcia is a 63 year old, presenting for the following health issues:  positve covid and Recheck Medication        4/6/2023    11:56 AM   Additional Questions   Roomed by curt   Accompanied by alone         4/6/2023    11:56 AM   Patient Reported Additional Medications   Patient reports taking the following new medications no     HPI developed congestion and flu like fatigue and myalgia.  No headache or high fever.  Cough is mild, some rhinorrhea.  No nausea or diarrhea.     Pt reports that he tested positive for Covid and continues to not feel good.  Review of Systems   See HPI      Objective    He is well appearing, in NAD, mild cough once during interview, with sneeze.  Speech is clear and normal.      Video-Visit Details    Type of service:  Video Visit  Time on;  12:34PM, time off 12:46PM    Originating Location (pt. Location): Home  Distant Location (provider location):  Off-site  Platform used for Video Visit: [email or web address]

## 2023-04-06 NOTE — TELEPHONE ENCOUNTER
RN COVID TREATMENT VISIT  04/06/23      The patient has been triaged and does not require a higher level of care.    Kenney Mgcowan  63 year old  Current weight? 76.7 kg (169 lb)    Has the patient been seen by a primary care provider at an [facility] or [facility] within the past two years? Yes.   Have you been in close proximity to/do you have a known exposure to a person with a confirmed case of influenza? No.     General treatment eligibility:  Date of positive COVID test (PCR or at home)?  4/6/23    Are you or have you been hospitalized for this COVID-19 infection? No.   Have you received monoclonal antibodies or antiviral treatment for COVID-19 since this positive test? No.   Do you have any of the following conditions that place you at risk of being very sick from COVID-19?   - Age 50 years or older  - Chronic lung diseases such as asthma, bronchiectasis, COPD, interstitial lung disease, pulmonary embolism, pulmonary hypertension   -hypertension  Yes, patient has at least one high risk condition as noted above.     Current COVID symptoms:   - fever or chills  - cough  - fatigue  - muscle or body aches  - headache  - congestion or runny nose  Yes. Patient has at least one symptom as selected.     How many days since symptoms started? 5 days or less. Established patient, 12 years or older weighing at least 88.2 lbs, who has symptoms that started in the past 5 days, has not been hospitalized nor received treatment already, and is at risk for being very sick from COVID-19.     Treatment eligibility by RN:    Are you currently pregnant or nursing? No    Do you have a clinically significant hypersensitivity to nirmatrelvir or ritonavir, or toxic epidermal necrolysis (TEN) or Mari-Brian Syndrome? No    Do you have a history of hepatitis, any hepatic impairment on the Problem List (such as Child-Love Class C, cirrhosis, fatty liver disease, alcoholic liver disease), or was the last  liver lab (hepatic panel, ALT, AST, ALK Phos, bilirubin) elevated in the past 6 months? No    Do you have any history of severe renal impairment (eGFR < 30mL/min)? No    Is patient eligible to continue?   Yes, patient meets all eligibility requirements for the RN COVID treatment (as denoted by all no responses above).     Current Outpatient Medications   Medication Sig Dispense Refill     albuterol (PROAIR HFA/PROVENTIL HFA/VENTOLIN HFA) 108 (90 Base) MCG/ACT inhaler Inhale 1-2 puffs into the lungs every 4 hours as needed for shortness of breath or wheezing 8.5 g 2     fluticasone-salmeterol (ADVAIR DISKUS) 250-50 MCG/ACT inhaler Inhale 1 puff into the lungs every 12 hours 180 each 3     lisinopril (ZESTRIL) 20 MG tablet Take 1 tablet (20 mg) by mouth daily 90 tablet 3     montelukast (SINGULAIR) 10 MG tablet TAKE 1 TABLET(10 MG) BY MOUTH AT BEDTIME 90 tablet 3       Medications from List 1 of the standing order (on medications that exclude the use of Paxlovid) that patient is taking: NONE. Is patient taking Castana's Wort? No  Is patient taking Lissette's Wort or any meds from List 1? No.   Medications from List 2 of the standing order (on meds that provider needs to adjust) that patient is taking: salmeterol and salmeterol-containing medications (Advair), explained a provider visit is necessary to discuss medication adjustments while taking Paxlovid.          Reason for Disposition    [1] COVID-19 diagnosed by positive lab test (e.g., PCR, rapid self-test kit) AND [2] mild symptoms (e.g., cough, fever, others) AND [3] no complications or SOB    Additional Information    Negative: SEVERE difficulty breathing (e.g., struggling for each breath, speaks in single words)    Negative: Difficult to awaken or acting confused (e.g., disoriented, slurred speech)    Negative: Bluish (or gray) lips or face now    Negative: Shock suspected (e.g., cold/pale/clammy skin, too weak to stand, low BP, rapid pulse)    Negative:  Sounds like a life-threatening emergency to the triager    Negative: [1] Diagnosed or suspected COVID-19 AND [2] symptoms lasting 3 or more weeks    Negative: [1] COVID-19 exposure AND [2] no symptoms    Negative: COVID-19 vaccine reaction suspected (e.g., fever, headache, muscle aches) occurring 1 to 3 days after getting vaccine    Negative: COVID-19 vaccine, questions about    Negative: [1] Lives with someone known to have influenza (flu test positive) AND [2] flu-like symptoms (e.g., cough, runny nose, sore throat, SOB; with or without fever)    Negative: [1] Adult with possible COVID-19 symptoms AND [2] triager concerned about severity of symptoms or other causes    Negative: COVID-19 and breastfeeding, questions about    Negative: SEVERE or constant chest pain or pressure  (Exception: Mild central chest pain, present only when coughing.)    Negative: MODERATE difficulty breathing (e.g., speaks in phrases, SOB even at rest, pulse 100-120)    Negative: Headache and stiff neck (can't touch chin to chest)    Negative: Oxygen level (e.g., pulse oximetry) 90 percent or lower    Negative: Chest pain or pressure  (Exception: MILD central chest pain, present only when coughing)    Negative: Patient sounds very sick or weak to the triager    Negative: MILD difficulty breathing (e.g., minimal/no SOB at rest, SOB with walking, pulse <100)    Negative: Fever > 103 F (39.4 C)    Negative: [1] Fever > 101 F (38.3 C) AND [2] over 60 years of age    Negative: [1] Fever > 100.0 F (37.8 C) AND [2] bedridden (e.g., nursing home patient, CVA, chronic illness, recovering from surgery)    Negative: [1] HIGH RISK for severe COVID complications (e.g., weak immune system, age > 64 years, obesity with BMI of 30 or higher, pregnant, chronic lung disease or other chronic medical condition) AND [2] COVID symptoms (e.g., cough, fever)  (Exceptions: Already seen by PCP and no new or worsening symptoms.)    Negative: [1] HIGH RISK patient AND  [2] influenza is widespread in the community AND [3] ONE OR MORE respiratory symptoms: cough, sore throat, runny or stuffy nose    Negative: [1] HIGH RISK patient AND [2] influenza exposure within the last 7 days AND [3] ONE OR MORE respiratory symptoms: cough, sore throat, runny or stuffy nose    Negative: Oxygen level (e.g., pulse oximetry) 91 to 94 percent    Negative: [1] COVID-19 infection suspected by caller or triager AND [2] mild symptoms (cough, fever, or others) AND [3] negative COVID-19 rapid test    Negative: Fever present > 3 days (72 hours)    Negative: [1] Fever returns after gone for over 24 hours AND [2] symptoms worse or not improved    Negative: [1] Continuous (nonstop) coughing interferes with work or school AND [2] no improvement using cough treatment per Care Advice    Negative: Cough present > 3 weeks    Negative: [1] COVID-19 diagnosed by positive lab test (e.g., PCR, rapid self-test kit) AND [2] NO symptoms (e.g., cough, fever, others)    Protocols used: CORONAVIRUS (COVID-19) DIAGNOSED OR [id number]

## 2023-04-09 ENCOUNTER — NURSE TRIAGE (OUTPATIENT)
Dept: NURSING | Facility: CLINIC | Age: 63
End: 2023-04-09
Payer: COMMERCIAL

## 2023-04-09 NOTE — TELEPHONE ENCOUNTER
Caller has Covid; positive test 04/05;  No Paxlovid after VV with PCP,    State he is on day 5 and received message  rom employer he is to return to work tomorrow.   Caller reports he would be unable to work as a  due to fatigue, and minimal dyspnea/ wheezing on exertion; States he  Has some asthma issues, uses advair at night but has not needed rescue inhaler; symptoms resolve with rest.  Triage protocol reviewed     Denies fever , productive cough chest pain or other red flag symptoms   No worsening symptoms at present but is finding it hard to resume normal level of  activity at home.  Advised could start  Paxlovid today, day 5 but declinies.  Advised assessment at   today but patient  would prefer to have a follow up  VV with his PCP tomorrow  Caller will call back with new or worsening symptoms   Cathie Blount RN  FNA              Reason for Disposition    MILD difficulty breathing (e.g., minimal/no SOB at rest, SOB with walking, pulse <100)    Additional Information    Negative: SEVERE difficulty breathing (e.g., struggling for each breath, speaks in single words)    Negative: Difficult to awaken or acting confused (e.g., disoriented, slurred speech)    Negative: Bluish (or gray) lips or face now    Negative: Shock suspected (e.g., cold/pale/clammy skin, too weak to stand, low BP, rapid pulse)    Negative: Sounds like a life-threatening emergency to the triager    Negative: [1] Diagnosed or suspected COVID-19 AND [2] symptoms lasting 3 or more weeks    Negative: [1] COVID-19 exposure AND [2] no symptoms    Negative: COVID-19 vaccine reaction suspected (e.g., fever, headache, muscle aches) occurring 1 to 3 days after getting vaccine    Negative: COVID-19 vaccine, questions about    Negative: [1] Lives with someone known to have influenza (flu test positive) AND [2] flu-like symptoms (e.g., cough, runny nose, sore throat, SOB; with or without fever)    Negative: [1] Adult with possible COVID-19  symptoms AND [2] triager concerned about severity of symptoms or other causes    Negative: COVID-19 and breastfeeding, questions about    Negative: SEVERE or constant chest pain or pressure  (Exception: Mild central chest pain, present only when coughing.)    Negative: MODERATE difficulty breathing (e.g., speaks in phrases, SOB even at rest, pulse 100-120)    Negative: [1] Headache AND [2] stiff neck (can't touch chin to chest)    Negative: Oxygen level (e.g., pulse oximetry) 90 percent or lower    Negative: Chest pain or pressure    Negative: Patient sounds very sick or weak to the triager    Protocols used: CORONAVIRUS (COVID-19) DIAGNOSED OR [id number]

## 2023-04-10 ENCOUNTER — VIRTUAL VISIT (OUTPATIENT)
Dept: FAMILY MEDICINE | Facility: CLINIC | Age: 63
End: 2023-04-10
Payer: COMMERCIAL

## 2023-04-10 DIAGNOSIS — U07.1 INFECTION DUE TO 2019 NOVEL CORONAVIRUS: Primary | ICD-10-CM

## 2023-04-10 PROCEDURE — 99212 OFFICE O/P EST SF 10 MIN: CPT | Mod: CS | Performed by: PHYSICIAN ASSISTANT

## 2023-04-10 NOTE — LETTER
April 10, 2023      Kenney Mcgowan  [address] [place] [facility] [address]        To Whom It May Concern:    Kenney Mcgowan was seen in our clinic for follow up status post COVID+ result on April 6, 2023. He may return to work no restrictions on or about 4/17/2023.      Sincerely,        Sherrie Stuart PA-C

## 2023-05-15 ENCOUNTER — TELEPHONE (OUTPATIENT)
Dept: FAMILY MEDICINE | Facility: CLINIC | Age: 63
End: 2023-05-15
Payer: COMMERCIAL

## 2023-05-15 NOTE — TELEPHONE ENCOUNTER
Dear triage,  Please call patient.  I think he can use some education regarding FMLA for reduced hours.  If he is interested to reduce our, I do recommend follow-up with me, ideally an office visit, however if he is unable to do office visit then video virtual visit with me.    I also advised that if there are necessary forms that need to be completed, patient already has them and send them to us prior to scheduled appointment with me.  Thanks

## 2023-05-15 NOTE — TELEPHONE ENCOUNTER
"A.S.        General Call      Reason for Call:  Patient called.  States he works for the [employer], walking 13 miles a day/6 days a week.    States his work is wearing him out.    Wants to talk to you about this and possibly cutting his work week to 8 hour days/5 days a week.  Discuss possible letter from you    Asking if he can do a VV with you.    Asked if he has spoken with HR about this \"I work for the post office\"    Could we send this information to you in [place] or would you prefer to receive a phone call?:   Patient would prefer a phone call   Okay to leave a detailed message?: Yes at Cell number on file:    Telephone Information:   Mobile [phone]       Thank you,  Abbie Zamora RN    "

## 2023-05-16 ENCOUNTER — NURSE TRIAGE (OUTPATIENT)
Dept: NURSING | Facility: CLINIC | Age: 63
End: 2023-05-16

## 2023-05-16 ENCOUNTER — VIRTUAL VISIT (OUTPATIENT)
Dept: FAMILY MEDICINE | Facility: CLINIC | Age: 63
End: 2023-05-16
Payer: COMMERCIAL

## 2023-05-16 DIAGNOSIS — R63.4 WEIGHT LOSS: ICD-10-CM

## 2023-05-16 DIAGNOSIS — U09.9 POST-COVID CHRONIC FATIGUE: Primary | ICD-10-CM

## 2023-05-16 DIAGNOSIS — G93.32 POST-COVID CHRONIC FATIGUE: Primary | ICD-10-CM

## 2023-05-16 DIAGNOSIS — J45.40 MODERATE PERSISTENT ASTHMA WITHOUT COMPLICATION: ICD-10-CM

## 2023-05-16 DIAGNOSIS — I10 ESSENTIAL HYPERTENSION, BENIGN: ICD-10-CM

## 2023-05-16 PROCEDURE — 99213 OFFICE O/P EST LOW 20 MIN: CPT | Mod: 95 | Performed by: FAMILY MEDICINE

## 2023-05-16 ASSESSMENT — ASTHMA QUESTIONNAIRES: ACT_TOTALSCORE: 23

## 2023-05-16 NOTE — LETTER
May 16, 2023      Kenney Mcgowan  [address] [facility] [address]        To Whom It May Concern:    Kenney Mcgowan had a telephone only appointment 05/16/23 with me .it is advised  to limit work with the following: limited to 8 hour workday for 5 days a week- ,starting 05/16/23 due to medical condition till indefinitely        Sincerely,        Luisa Fonseca MD

## 2023-05-16 NOTE — PROGRESS NOTES
"Garcia is a 63 year old who is being evaluated via a billable video visit.      How would you like to obtain your AVS? MyChart  If the video visit is dropped, the invitation should be resent by: {video visit invitation (Optional) :565286}  Will anyone else be joining your video visit? No  {If patient encounters technical issues they should call [phone] :661553}        {PROVIDER CHARTING PREFERENCE:856506}    Subjective   Garcia is a 63 year old, presenting for the following health issues:  No chief complaint on file.        4/10/2023    11:43 AM   Additional Questions   Roomed by david cedillo   Accompanied by self     HPI     {SUPERLIST (Optional):851325}  {additonal problems for provider to add (Optional):460921}      Review of Systems   {ROS COMP (Optional):998998}      Objective           Vitals:  No vitals were obtained today due to virtual visit.    Physical Exam   {video visit exam brief selected:658771::\"GENERAL: Healthy, alert and no distress\",\"EYES: Eyes grossly normal to inspection.  No discharge or erythema, or obvious scleral/conjunctival abnormalities.\",\"RESP: No audible wheeze, cough, or visible cyanosis.  No visible retractions or increased work of breathing.  \",\"SKIN: Visible skin clear. No significant rash, abnormal pigmentation or lesions.\",\"NEURO: Cranial nerves grossly intact.  Mentation and speech appropriate for age.\",\"PSYCH: Mentation appears normal, affect normal/bright, judgement and insight intact, normal speech and appearance well-groomed.\"}    {Diagnostic Test Results (Optional):927924}    {AMBULATORY ATTESTATION (Optional):850153}        Video-Visit Details    Type of service:  Video Visit     Originating Location (pt. Location): {video visit patient location:424174::\"Home\"}  {PROVIDER LOCATION On-site should be selected for visits conducted from your clinic location or adjoining [facility] hospital, academic office, or other nearby [facility] building. Off-site should be selected for all other provider " "locations, including home:975726}  Distant Location (provider location):  {virtual location provider:647296}  Platform used for Video Visit: {Virtual Visit Platforms:675697::\"Deemelo\"}    "

## 2023-05-16 NOTE — TELEPHONE ENCOUNTER
Patient called back   Scheduled for VV ,   unable to do in office because he is only off sundays.   Patient does not have any forms to be filled  Might be wanting a letter that indicates he has restrictions or allows him to reduce hours at work        JONATHAN Nation

## 2023-05-16 NOTE — PROGRESS NOTES
Garcia is a 63 year old who is being evaluated via a billable telephone visit.      What phone number would you like to be contacted at? [phone]  How would you like to obtain your AVS? Jared  Distant Location (provider location):  On-site    Assessment & Plan     Post-COVID chronic fatigue  Plan: limit work week 8 hr/5 days a weelk work restriction is given  Advised office visit   He reports weight loss as well and has great appetite   I do recommend to make a lab only appointment and complete Chest xray  And TB screening test.  Meanwhile work resection letter given and monitor own symptoms.  If fatigue persist- need to be seen for office visit in person      (R63.4) Weight loss  Comment:reports great appetite, eats well , yet weight loss. It seems multifactorial as walks about 70 miles a mail  and that contributes to weight loss. At the same time due to fatigue and weight issues blood tests advised to rule out hyperthyroidism,TB and anemia  DDx does include mood changes and cancer as well   Plan: Quantiferon TB Gold Plus, TSH with free T4         reflex, CBC with platelets, Ferritin          Known asthma- well controlled  ACT completed    HYPERTENSION - well controlled on lisinopril 20 mg once daily  And does not need refills        Ordering of each unique test  I spent a total of 20 minutes on the day of the visit.   Time spent by me doing chart review, history and exam, documentation and further activities per the note           Luisa Fonseca MD  [facility]   Garcia is a 63 year old, presenting for the following health issues:  No chief complaint on file.        5/16/2023     2:04 PM   Additional Questions   Roomed by Carrie P     Forms 5/16/2023   Any forms needing to be completed Yes     HPI     Patient would like letter with restriction for work. Working 10-12 hours per day and walking 10-14 miles per day. Would like to reduce work hours.    Under staffed  "station. Working more than ever before n past 4 weeks ,now 6 days a week. 60 hours + 70 miles week  Fatigue from walking.  Lower back pain is worse.  Not able to walk as much as before    Well controlled hypertension  No concerns with medications   Well controlled asthma     Review of Systems   Constitutional, HEENT, cardiovascular, pulmonary, GI, , musculoskeletal, neuro, skin, endocrine and psych systems are negative, except as otherwise noted.      Objective    Vitals - Patient Reported  Weight (Patient Reported): 72.6 kg (160 lb)  Height (Patient Reported): 180.3 cm (5' 11\")  BMI (Based on Pt Reported Ht/Wt): 22.32        Physical Exam   healthy, alert and no distress  PSYCH: Alert and oriented times 3; coherent speech, normal   rate and volume, able to articulate logical thoughts, able   to abstract reason, no tangential thoughts, no hallucinations   or delusions  His affect is normal  RESP: No cough, no audible wheezing, able to talk in full sentences  Remainder of exam unable to be completed due to telephone visits                Phone call duration: 13  minutes    "

## 2023-05-17 NOTE — TELEPHONE ENCOUNTER
Garcia calling states he is locked out of [employer]. Provided number to [employer] support to call in AM.  Marta Travis RN on 5/16/2023 at 9:59 PM    Reason for Disposition    General information question, no triage required and triager able to answer question    Protocols used: INFORMATION ONLY CALL - NO TRIAGE-A-

## 2023-07-14 ENCOUNTER — OFFICE VISIT (OUTPATIENT)
Dept: FAMILY MEDICINE | Facility: CLINIC | Age: 63
End: 2023-07-14
Payer: COMMERCIAL

## 2023-07-14 ENCOUNTER — ANCILLARY PROCEDURE (OUTPATIENT)
Dept: GENERAL RADIOLOGY | Facility: CLINIC | Age: 63
End: 2023-07-14
Payer: COMMERCIAL

## 2023-07-14 VITALS
OXYGEN SATURATION: 98 % | WEIGHT: 169.7 LBS | DIASTOLIC BLOOD PRESSURE: 80 MMHG | HEART RATE: 84 BPM | HEIGHT: 67 IN | SYSTOLIC BLOOD PRESSURE: 148 MMHG | RESPIRATION RATE: 10 BRPM | TEMPERATURE: 97.1 F | BODY MASS INDEX: 26.63 KG/M2

## 2023-07-14 DIAGNOSIS — M25.511 ACUTE PAIN OF RIGHT SHOULDER: ICD-10-CM

## 2023-07-14 DIAGNOSIS — S46.001A ROTATOR CUFF INJURY, RIGHT, INITIAL ENCOUNTER: ICD-10-CM

## 2023-07-14 DIAGNOSIS — M25.511 ACUTE PAIN OF RIGHT SHOULDER: Primary | ICD-10-CM

## 2023-07-14 PROCEDURE — 73030 X-RAY EXAM OF SHOULDER: CPT | Mod: TC | Performed by: RADIOLOGY

## 2023-07-14 PROCEDURE — 99213 OFFICE O/P EST LOW 20 MIN: CPT | Performed by: FAMILY MEDICINE

## 2023-07-14 ASSESSMENT — PAIN SCALES - GENERAL: PAINLEVEL: SEVERE PAIN (7)

## 2023-07-14 NOTE — PROGRESS NOTES
"Assessment & Plan   Acute pain of right shoulder  - XR Shoulder Right G/E 3 Views  - Physical Therapy Referral  Will reassess in about two weeks.    Rotator cuff injury, right, initial encounter  - Physical Therapy Referral    Patient describes a Workman's Comp injury. He has left a message for his superior, but is not sure when follow-up needs to be done. He will look into that as soon as possible.  BMI:   Estimated body mass index is 26.95 kg/m  as calculated from the following:    Height as of this encounter: 1.69 m (5' 6.54\").    Weight as of this encounter: 77 kg (169 lb 11.2 oz).   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 No specific injury. No previous should injury. Since that time, the pain is minimal when the arm is at rest, but he cannot reach forward or the the side without significant pain. No numbness or tingling. No other injury.    Musculoskeletal Problem    History of Present Illness       Reason for visit:  Arm /shoulder injury  Symptom onset:  1-3 days ago  Symptoms include:  Soreness     Restricted use  Symptom intensity:  Severe  Symptom progression:  Staying the same  Had these symptoms before:  No    He eats 2-3 servings of fruits and vegetables daily.He consumes 0 sweetened beverage(s) daily.He exercises with enough effort to increase his heart rate 60 or more minutes per day.  He exercises with enough effort to increase his heart rate 5 days per week.   He is taking medications regularly.   Objective    BP (!) 148/80 (BP Location: " "Left arm, Patient Position: Sitting, Cuff Size: Adult Regular)   Pulse 84   Temp 97.1  F (36.2  C) (Tympanic)   Resp 10   Ht 1.69 m (5' 6.54\")   Wt 77 kg (169 lb 11.2 oz)   SpO2 98%   BMI 26.95 kg/m    Body mass index is 26.95 kg/m .  Physical Exam   GENERAL: healthy, alert and no distress  EYES: Eyes grossly normal to inspection, PERRL and conjunctivae and sclerae normal  NECK: no adenopathy, no asymmetry, masses, or scars and thyroid normal to palpation  RESP: lungs clear to auscultation - no rales, rhonchi or wheezes  CV: regular rate and rhythm, normal S1 S2, no S3 or S4, no murmur, click or rub, no peripheral edema and peripheral pulses strong  MS: no gross musculoskeletal defects noted, no edema. The shoulders are symmetric and well muscled. The right shoulder has full passive stretch, but movement against gravity hurts and pain limits motion. Rotation is okay, but anterior and lateral ABduction is painful. Normal strength at the hands, wrists and biceps and triceps. FROM neck. He indicates the pain is a band about 4\" long parallel to the humerus extending from just above the glenohumeral joint to just below the head at the posterior-lateral aspect.    Results for orders placed or performed in visit on 07/14/23   XR Shoulder Right G/E 3 Views     Status: None    Narrative    EXAM: XR SHOULDER RIGHT G/E 3 VIEWS  LOCATION: [facility]  DATE: 7/14/2023    INDICATION:  Acute pain of right shoulder  COMPARISON: None.      Impression    IMPRESSION: No acute fractures are evident. Normal glenohumeral alignment. Moderate degenerative changes in the acromioclavicular joint. Multiple old healed right rib fractures.     "

## 2023-07-17 ENCOUNTER — TELEPHONE (OUTPATIENT)
Dept: FAMILY MEDICINE | Facility: CLINIC | Age: 63
End: 2023-07-17
Payer: COMMERCIAL

## 2023-07-17 DIAGNOSIS — S46.001A ROTATOR CUFF INJURY, RIGHT, INITIAL ENCOUNTER: ICD-10-CM

## 2023-07-17 DIAGNOSIS — M25.511 ACUTE PAIN OF RIGHT SHOULDER: Primary | ICD-10-CM

## 2023-07-17 NOTE — TELEPHONE ENCOUNTER
Patient is calling back stating his manager needs to file this today. Patient want to see if a letter can be done today. Please advise.

## 2023-07-17 NOTE — TELEPHONE ENCOUNTER
"Patient is calling back and said sorry to bother but letter will need to state \"   Kenney Mcgowan was seen in our clinic on July 14th. Patient can return back to work following Physical Therapy and when he is able to work.\"  "

## 2023-07-17 NOTE — LETTER
July 17, 2023      Kenney Mcgowan  [address] [facility] [address]  [place] [address]        To Whom It May Concern:    Kenney Mcgowan was seen in our clinic on July 14th. He may return to work with the following restrictions: No lifting more than 10# or reaching with his right upper extremity for at least two weeks.      Sincerely,        GERARDO QUINTERO MD

## 2023-07-17 NOTE — TELEPHONE ENCOUNTER
Forms/Letter Request    Type of form/letter: Work    Have you been seen for this request: Yes 7/14/23    Do we have the form/letter: No will need to write one    Due to work comps there will need to be a letter stating patient is unable to perform work due to right rotator cuff injury. OT and PT will be scheduled to help.     When is form/letter needed by: ASAP    How would you like the form/letter returned: [employer]    Patient Notified form requests are processed in 3-5 business days:Yes    Could we send this information to you in [employer] or would you prefer to receive a phone call?:   Patient would prefer a phone call   Okay to leave a detailed message?: Yes at Cell number on file:    Telephone Information:   Mobile [phone]

## 2023-07-20 ENCOUNTER — TELEPHONE (OUTPATIENT)
Dept: FAMILY MEDICINE | Facility: CLINIC | Age: 63
End: 2023-07-20

## 2023-07-20 NOTE — TELEPHONE ENCOUNTER
MARIA LUISA.S,  Patient calling to follow-up on recent visit with Dr. Souza for rotator cuff injury.  States that he knows what is really needed is physical therapy.  Is very uncomfortable and in pain with his job.  Trying to do his job left handed.  Current letter states he can return to work with restrictions. 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  Would be more beneficial if he was able to attend PT to heal and then return to work.  Has reached out to Dr. Souza to ask for this, but has not gotten a positive response.  Patient states he did have a physical therapy appointment scheduled for today, but he had to return to work so was not able to attend this.  Patient willing to come in and see if you needed- has the day off tomorrow, but currently no openings.  Please advise.  Umm LINARES RN

## 2023-07-20 NOTE — TELEPHONE ENCOUNTER
Need OV or VV appointment - with me- to clarify concerns/evaluation and his expectation.  I have not seen him for this current concerns  Thanks

## 2023-07-27 ENCOUNTER — TELEPHONE (OUTPATIENT)
Dept: FAMILY MEDICINE | Facility: CLINIC | Age: 63
End: 2023-07-27
Payer: COMMERCIAL

## 2023-08-23 ENCOUNTER — NURSE TRIAGE (OUTPATIENT)
Dept: FAMILY MEDICINE | Facility: CLINIC | Age: 63
End: 2023-08-23

## 2023-08-23 NOTE — TELEPHONE ENCOUNTER
"Pt calling stating he hurt himself at work and his left glue and left leg is hurting and lower left leg has tingling and numbness that started in last couple of hours. No bruising, color changes, or swelling.    Advised pt per protocol that pt should be seen in ER or UC, pt agreeable to plan.     Thanks!    Reason for Disposition   Patient wants to be seen    Answer Assessment - Initial Assessment Questions  1. MECHANISM: \"How did the injury happen?\" (e.g., twisting injury, direct blow)       Walking around for work carrying mail.  2. ONSET: \"When did the injury happen?\" (Minutes or hours ago)       Around noon  3. LOCATION: \"Where is the injury located?\"       Left glute and down left leg  4. APPEARANCE of INJURY: \"What does the injury look like?\"  (e.g., deformity of leg)      No abnormalities  5. SEVERITY: \"Can you put weight on that leg?\" \"Can you walk?\"       Yes but painful to walk.  6. SIZE: For cuts, bruises, or swelling, ask: \"How large is it?\" (e.g., inches or centimeters)       N/a  7. PAIN: \"Is there pain?\" If Yes, ask: \"How bad is the pain?\"   \"What does it keep you from doing?\" (e.g., Scale 1-10; or mild, moderate, severe)    -  NONE: (0): no pain    -  MILD (1-3): doesn't interfere with normal activities     -  MODERATE (4-7): interferes with normal activities (e.g., work or school) or awakens from sleep, limping     -  SEVERE (8-10): excruciating pain, unable to do any normal activities, unable to walk      7 or 8 when resting and 10 when using the leg.  8. TETANUS: For any breaks in the skin, ask: \"When was the last tetanus booster?\"      N/a  9. OTHER SYMPTOMS: \"Do you have any other symptoms?\"       Tingling and numbness in foot.  10. PREGNANCY: \"Is there any chance you are pregnant?\" \"When was your last menstrual period?\"        N/a    Protocols used: Leg Injury-A-[place]    Robert Mccray RN   [facility]    "

## 2023-08-24 ENCOUNTER — ANCILLARY PROCEDURE (OUTPATIENT)
Dept: GENERAL RADIOLOGY | Facility: CLINIC | Age: 63
End: 2023-08-24
Attending: PHYSICIAN ASSISTANT
Payer: COMMERCIAL

## 2023-08-24 ENCOUNTER — OFFICE VISIT (OUTPATIENT)
Dept: URGENT CARE | Facility: URGENT CARE | Age: 63
End: 2023-08-24
Payer: OTHER MISCELLANEOUS

## 2023-08-24 VITALS
SYSTOLIC BLOOD PRESSURE: 146 MMHG | WEIGHT: 169 LBS | HEART RATE: 76 BPM | OXYGEN SATURATION: 98 % | BODY MASS INDEX: 26.84 KG/M2 | DIASTOLIC BLOOD PRESSURE: 74 MMHG | TEMPERATURE: 97.6 F

## 2023-08-24 DIAGNOSIS — M47.26 OSTEOARTHRITIS OF SPINE WITH RADICULOPATHY, LUMBAR REGION: ICD-10-CM

## 2023-08-24 DIAGNOSIS — M54.16 LUMBAR BACK PAIN WITH RADICULOPATHY AFFECTING LEFT LOWER EXTREMITY: Primary | ICD-10-CM

## 2023-08-24 PROCEDURE — 73502 X-RAY EXAM HIP UNI 2-3 VIEWS: CPT | Mod: TC | Performed by: RADIOLOGY

## 2023-08-24 PROCEDURE — 99214 OFFICE O/P EST MOD 30 MIN: CPT | Performed by: PHYSICIAN ASSISTANT

## 2023-08-24 PROCEDURE — 72100 X-RAY EXAM L-S SPINE 2/3 VWS: CPT | Mod: TC | Performed by: RADIOLOGY

## 2023-08-24 RX ORDER — PREDNISONE 20 MG/1
40 TABLET ORAL DAILY
Qty: 10 TABLET | Refills: 0 | Status: SHIPPED | OUTPATIENT
Start: 2023-08-24 | End: 2023-08-29

## 2023-08-24 ASSESSMENT — ENCOUNTER SYMPTOMS
FEVER: 0
BACK PAIN: 1

## 2023-08-24 ASSESSMENT — PAIN SCALES - GENERAL: PAINLEVEL: SEVERE PAIN (7)

## 2023-08-24 NOTE — LETTER
August 24, 2023      Kenney Mcgowan  [address] [facility] [address]        To Whom It May Concern:    Kenney Mcgowan was seen in our clinic. He may return to work when cleared by spine.      Sincerely,        FERNANDEZ Conroy

## 2023-08-24 NOTE — PROGRESS NOTES
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  As the day progressed pain went down to left ankle and more difficulty ambulating.  Since then, back pain has improved and left buttocks pain is worse.  States sitting makes much worse.  No known back problems.  No dysuria or fevers.      Treatment:  600 ibuprofen yesterday.        Review of Systems   Constitutional:  Negative for fever.   Musculoskeletal:  Positive for back pain.   All other systems reviewed and are negative.      Past Medical History:   Diagnosis Date    Allergic rhinitis due to other allergen     Infection due to 2019 novel coronavirus 4/6/2023     Family History   Problem Relation Age of Onset    Diabetes Father     Heart Disease Father     Diabetes Sister     Heart Disease Mother     Other - See Comments Brother         airplane crash     Current Outpatient Medications   Medication Sig Dispense Refill    albuterol (PROAIR HFA/PROVENTIL HFA/VENTOLIN HFA) 108 (90 Base) MCG/ACT inhaler Inhale 1-2 puffs into the lungs every 4 hours as needed for shortness of breath or wheezing 8.5 g 2    fluticasone-salmeterol (ADVAIR DISKUS) 250-50 MCG/ACT inhaler Inhale 1 puff into the lungs every 12 hours 180 each 3    lisinopril (ZESTRIL) 20 MG tablet Take 1 tablet (20 mg) by mouth daily 90 tablet 3    montelukast (SINGULAIR) 10 MG tablet TAKE 1 TABLET(10 MG) BY MOUTH AT BEDTIME 90 tablet 3    predniSONE (DELTASONE) 20 MG tablet Take 2 tablets (40 mg) by mouth daily for 5 days 10 tablet 0    tiZANidine (ZANAFLEX) 4 MG tablet Take 1 tablet (4 mg) by mouth 3 times daily 25 tablet 0     Social History     Tobacco Use     Smoking status: Never    Smokeless tobacco: Former     Types: Chew   Substance Use Topics    Alcohol use: Yes     Comment: occassionally       OBJECTIVE  BP (!) 146/74   Pulse 76   Temp 97.6  F (36.4  C) (Tympanic)   Wt 76.7 kg (169 lb)   SpO2 98%   BMI 26.84 kg/m      Physical Exam  Vitals and nursing note reviewed.   Constitutional:       Appearance: Normal appearance. He is normal weight.   Eyes:      Extraocular Movements: Extraocular movements intact.      Conjunctiva/sclera: Conjunctivae normal.   Cardiovascular:      Rate and Rhythm: Normal rate.   Musculoskeletal:      Comments: Patient is able to ambulate, heel walk, toe walk, flex, extend, side flex and twist.  SLR on left is positive; strength in lower normal.  Warmth to extremities.  Skin over back normal.  No tenderness to bony spine.  Bilateral PSIS without tenderness.  Right hip exam is normal.  Pain with left hip ROM.       Neurological:      Mental Status: He is alert.         Labs:  Results for orders placed or performed in visit on 08/24/23 (from the past 24 hour(s))   XR Lumbar Spine 2/3 Views    Narrative    LUMBAR SPINE TWO TO THREE VIEWS   8/24/2023 1:52 PM     HISTORY: Lumbar back pain with radiculopathy affecting left lower  extremity.    COMPARISON: None.      Impression    IMPRESSION: No fracture is identified. Mild to moderate degenerative  change at L4-L5 and L5-S1.   XR Hip Left 2-3 Views    Narrative    XR HIP LEFT 2-3 VIEWS   8/24/2023 1:53 PM     HISTORY: Lumbar back pain with radiculopathy affecting left lower  extremity  COMPARISON: None.       Impression    IMPRESSION: No acute left hip fracture or dislocation. There are very  mild left hip degenerative changes.     SOFY ALICEA MD         SYSTEM ID:  [id number]       X-Ray was done, my findings are: radiographs reviewed and interpreted by myself;  DJD lumbar spine.      ASSESSMENT:      ICD-10-CM    1. Lumbar back pain with radiculopathy affecting left lower  extremity  M54.16 XR Lumbar Spine 2/3 Views     XR Hip Left 2-3 Views     predniSONE (DELTASONE) 20 MG tablet     Spine  Referral     tiZANidine (ZANAFLEX) 4 MG tablet     Physical Therapy Referral      2. Osteoarthritis of spine with radiculopathy, lumbar region  M47.26 Physical Therapy Referral           Medical Decision Making:    Differential Diagnosis:  Back Pain: degenerative disc disease, possible herniated disc , and acute sciatica    Serious Comorbid Conditions:  Adult:   reviewed    PLAN:    Rx for prednisone, zanaflex.  No etoh or driving on zanaflex.  Referral to spine.  Note for work.  Activity as tolerated.      Followup:    If not improving or if condition worsens, follow up with your Primary Care Provider, If not improving or if conditions worsens over the next 12-24 hours, go to the Emergency Department, In 7  day(s) follow up with   spine.    There are no Patient Instructions on file for this visit.

## 2023-08-29 ENCOUNTER — TELEPHONE (OUTPATIENT)
Dept: URGENT CARE | Facility: URGENT CARE | Age: 63
End: 2023-08-29
Payer: COMMERCIAL

## 2023-08-29 NOTE — TELEPHONE ENCOUNTER
"  Forms/Letter Request    Type of form/letter: Work    Have you been seen for this request: Yes 8/24/23    Comments: Patient's current work letter is insufficient because no diagnosis resulting from his injury has been provided. There is \"a physicians opinion in how the diagnosis resulted has not been provided.\" Please include more information in letter for patient to submit to his employer.    Who is the form from? Letter from clinic privider to patient's employer     When is form/letter needed by: As soon as possible    How would you like the form/letter returned: email - [email or web address]    Patient Notified form requests are processed in 3-5 business days:Yes    Could we send this information to you in [employer] or would you prefer to receive a phone call?:   Patient would prefer a phone call   Okay to leave a detailed message?: Yes at Home number on file [phone] (home)    "

## 2023-08-30 NOTE — TELEPHONE ENCOUNTER
Pt calling back asking for specific medical information to be included in letter from UC. Writer informed pt of UC provider message, pt understanding but states employer needs info in order to complete workers comp. Writer informs pt employer should not need medical information to complete this, pt understanding but states he just would like to get this done and doesn't care employer has medical info. Writer gave pt options to fill out medical release form in clinic as pt does not have fax number it could be sent to. Writer also suggested to print off AVS from UC visit and give to employer. Pt appreciative and states he will try these options.  SHARYN HENAO RN on 8/30/2023 at 10:36 AM

## 2023-08-31 ENCOUNTER — TELEPHONE (OUTPATIENT)
Dept: FAMILY MEDICINE | Facility: CLINIC | Age: 63
End: 2023-08-31
Payer: COMMERCIAL

## 2023-08-31 ENCOUNTER — THERAPY VISIT (OUTPATIENT)
Dept: PHYSICAL THERAPY | Facility: CLINIC | Age: 63
End: 2023-08-31
Attending: PHYSICIAN ASSISTANT

## 2023-08-31 DIAGNOSIS — M47.26 OSTEOARTHRITIS OF SPINE WITH RADICULOPATHY, LUMBAR REGION: ICD-10-CM

## 2023-08-31 DIAGNOSIS — M54.16 LUMBAR BACK PAIN WITH RADICULOPATHY AFFECTING LEFT LOWER EXTREMITY: ICD-10-CM

## 2023-08-31 DIAGNOSIS — M54.16 LEFT LUMBAR RADICULOPATHY: Primary | ICD-10-CM

## 2023-08-31 PROCEDURE — 97530 THERAPEUTIC ACTIVITIES: CPT | Mod: GP | Performed by: PHYSICAL THERAPIST

## 2023-08-31 PROCEDURE — 97161 PT EVAL LOW COMPLEX 20 MIN: CPT | Mod: GP | Performed by: PHYSICAL THERAPIST

## 2023-08-31 PROCEDURE — 97110 THERAPEUTIC EXERCISES: CPT | Mod: 59 | Performed by: PHYSICAL THERAPIST

## 2023-08-31 NOTE — TELEPHONE ENCOUNTER
Forms/Letter Request    Type of form/letter: Work    Have you been seen for this request: Yes Patient was seen at this location on 08-24-23.  His current employer is asking that his documentation that was received that day be signed off by either an MD or a DO as this cannot be signed by an NP or PA.      Do we have the form/letter: Yes: He is calling in regards to his 6 page after summary report     Who is the form from? After Summary Report for a visit on 08-24-23    Where did/will the form come from?     When is form/letter needed by: ASAP for his work so he is able to get compensated.      How would you like the form/letter returned: [employer]    Patient Notified form requests are processed in 3-5 business days:    Could we send this information to you in [employer] or would you prefer to receive a phone call?:   Patient would like to be contacted via [employer]  Patient would like it to be sent via [employer] any any further questions to reach out to patient at [phone].

## 2023-08-31 NOTE — PROGRESS NOTES
PHYSICAL THERAPY EVALUATION  Type of Visit: Evaluation    See electronic medical record for Abuse and Falls Screening details.    Subjective       Presenting condition or subjective complaint: ON 7/24/2023 developed left low back and leg pain with walking for work as a .  Went to Urgent Care.  Started Prednisone.  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  Usually walks 8 miles a day.  Date of onset: 08/24/23    Relevant medical history: Arthritis; Asthma   Dates & types of surgery:      Prior diagnostic imaging/testing results: X-ray     Prior therapy history for the same diagnosis, illness or injury: No      Prior Level of Function  Transfers: Independent  Ambulation: Independent  ADL: Independent  IADL:     Living Environment  Social support: With a significant other or spouse   Type of home: House   Stairs to enter the home: Yes 2 Is there a railing: No   Ramp: No   Stairs inside the home: Yes 14 Is there a railing: Yes   Help at home: None  Equipment owned: Straight Cane     Employment: Yes   Hobbies/Interests:      Patient goals for therapy: return to work and normal activities    Pain assessment: See objective evaluation for additional pain details  Used to run and play hockey 2 times a week.  Has a treadmill at home. Has had some pain in the past but no radiating pain.     Objective   LUMBAR SPINE EVALUATION  PAIN: to left footPain Location: lumbar spine  Pain is Relieved By:   steroid  Aggravated by sitting, standing, bending, sleeping  INTEGUMENTARY (edema, incisions): WNL  POSTURE: Standing Posture: Lordosis decreased  GAIT:   Weightbearing Status: WBAT  Assistive Device(s): None  Gait Deviations: Antalgic  BALANCE/PROPRIOCEPTION:   WEIGHTBEARING ALIGNMENT:   NON-WEIGHTBEARING ALIGNMENT:    ROM:  Pain to back of leg with forward bend, hands to knees, no pain with 50% extension   PELVIC/SI SCREEN:   STRENGTH: WNL  Work mechanical stresses:  lifting cartons of  mail              MYOTOMES: WNL  DTR S:   CORD SIGNS:   DERMATOMES: WNL  NEURAL TENSION:    Left Right   SLR Positive Positive   SLR with DF     Femoral Nerve     Slump     [place] (Lumbar)     [place] (Thoracic)     [place] (Cervical)     Median     Ulnar     Radial        FLEXIBILITY:  decreased hip extension and only 10 degrees of left hip passive ER an end range pain with left hip flexion  LUMBAR/HIP Special Tests:    PELVIS/SI SPECIAL TESTS:   FUNCTIONAL TESTS:   PALPATION:   SPINAL SEGMENTAL CONCLUSIONS:    Left Right   T10     T 11     T 12     L 1     L2 Hypo Hypo   L3 Hypo Hypo   L4 Hypo Hypo   L5 Hypo Hypo   S1           Assessment & Plan   CLINICAL IMPRESSIONS  Medical Diagnosis: Lumbar back pain with radiculopathy affecting left lower extremity    Treatment Diagnosis: Lumbar back pain with radiculopathy affecting left lower extremity   Impression/Assessment: Patient is a 63 year old male with low back and left leg complaints.  The following significant findings have been identified: Pain, Decreased ROM/flexibility, Decreased joint mobility, Decreased strength, Impaired gait, and Decreased activity tolerance. These impairments interfere with their ability to perform self care tasks, work tasks, recreational activities, household chores, household mobility, and community mobility as compared to previous level of function.     Clinical Decision Making (Complexity):  Clinical Presentation: Stable/Uncomplicated  Clinical Presentation Rationale: based on medical and personal factors listed in PT evaluation  Clinical Decision Making (Complexity): Low complexity    PLAN OF CARE  Treatment Interventions:  Interventions: Manual Therapy, Neuromuscular Re-education, Therapeutic Activity, Therapeutic Exercise    Long Term Goals     PT Goal 1  Goal Identifier: ambulation  Goal Description: patient able to walk for 4 miles without back or leg pain  Rationale: to maximize safety and independence within the community;to maximize  safety and independence with performance of ADLs and functional tasks  Target Date: 09/30/23  PT Goal 2  Goal Identifier: lifting  Goal Description: patient able to lift #20 from the floor without back or leg pain  Rationale: to maximize safety and independence with performance of ADLs and functional tasks  Target Date: 09/30/23      Frequency of Treatment: once a week  Duration of Treatment: 6 weeks    Recommended Referrals to Other Professionals:   Education Assessment:   Learner/Method: Patient;Reading;Demonstration;Pictures/Video;No Barriers to Learning;Listening    Risks and benefits of evaluation/treatment have been explained.   Patient/Family/caregiver agrees with Plan of Care.     Evaluation Time:     PT Eval, Low Complexity Minutes (29078): 15       Signing Clinician: Margoth Ferrera PT

## 2023-09-06 ENCOUNTER — OFFICE VISIT (OUTPATIENT)
Dept: INTERNAL MEDICINE | Facility: CLINIC | Age: 63
End: 2023-09-06
Payer: OTHER MISCELLANEOUS

## 2023-09-06 VITALS
BODY MASS INDEX: 26.92 KG/M2 | OXYGEN SATURATION: 97 % | DIASTOLIC BLOOD PRESSURE: 78 MMHG | HEART RATE: 94 BPM | TEMPERATURE: 98.5 F | WEIGHT: 169.5 LBS | SYSTOLIC BLOOD PRESSURE: 142 MMHG

## 2023-09-06 DIAGNOSIS — M54.16 LUMBAR RADICULOPATHY: Primary | ICD-10-CM

## 2023-09-06 PROCEDURE — 99213 OFFICE O/P EST LOW 20 MIN: CPT | Performed by: INTERNAL MEDICINE

## 2023-09-06 NOTE — LETTER
2023    To whom it may concern:    I am writing this letter on behalf of Kenney Mcgowan (: 1960).   He was seen in my clinic today.  This is to verify that this patient suffered a work related back injury on Aug 23, with symptoms of low back pain and radicular left leg pain.  He has been diagnosed with lumbar degenerative disease, and this injury was due to an acute nerve root impingement or irritation.    He has improved with conservative therapy, including structured physical therapy and anti-inflammatory medication.    I verify that he should be excused from work due to this injury starting on Aug 24, but at this point he is cleared to return to work as of .     Please contact my office if you have questions or concerns.    Sincerely,        YOCASTA Joy  Department of Internal Medicine  [facility]

## 2023-09-06 NOTE — PROGRESS NOTES
"  Assessment & Plan     Lumbar radiculopathy  Improving with conservative therapy, has scheduled appointment with orthopedics tomorrow, but given improvement seen thus far, I see no reason why he cannot return to work as of this coming Monday.  Wrote letter conveying this.           MED REC REQUIRED  Post Medication Reconciliation Status:  Discharge medications reconciled, continue medications without change  BMI:   Estimated body mass index is 26.92 kg/m  as calculated from the following:    Height as of 7/14/23: 1.69 m (5' 6.54\").    Weight as of this encounter: 76.9 kg (169 lb 8 oz).       Rudy Joy MD  [facility]          Jacqueline Zelaya is a 63 year old, presenting for the following health issues:      WC (Low back pain radiating into L hip and leg ) and UC Follow-Up    HPI       Since his urgent care visit, and with participation in physical therapy, his symptoms have improved.  A little stiff in his back, but he feels ready to return to work as of this coming Monday.      Review of Systems   Constitutional, HEENT, cardiovascular, pulmonary, gi and gu systems are negative, except as otherwise noted.      Objective    BP (!) 142/78   Pulse 94   Temp 98.5  F (36.9  C)   Wt 76.9 kg (169 lb 8 oz)   SpO2 97%   BMI 26.92 kg/m    Body mass index is 26.92 kg/m .  Physical Exam   GENERAL: healthy, alert and no distress  MS: no gross musculoskeletal defects noted, no edema  NEURO: Normal strength and tone, sensory exam grossly normal, mentation intact, DTR's normal and symmetric, and gait normal including heel/toe/tandem walking                      "

## 2023-09-07 NOTE — TELEPHONE ENCOUNTER
This is not [facility] patient    This patient is seen at [facility] and should address his work concerns with primary provider and already seen by a primary provider yesterday 9/6.  In addition, there has been prior messages already noted on 8/29 regarding this concern.    Please review appropriate routing at this was already addressed by [facility].    Closing encounter at this time.

## 2023-10-12 NOTE — PROGRESS NOTES
Patient seen for one time evaluation and treatment.  Patient did not return for further treatment and current status is unknown.  Please see initial evaluation for further information.  Patient canceled follow up visits stating condition had improved.

## 2024-01-01 NOTE — PROGRESS NOTES
Kenney is a 62 year old male being evaluated via a billable phone visit, and would like to be contacted via the following  Home number on file [phone] (home)    ASSESSMENT and PLAN:  1. Fatigue, unspecified type  Abrupt onset suggests infection.  Consider COVID in current environment.  Consider tickborne disease, prostatitis etc.  Begin testing.  No empiric treatment  - Symptomatic; Yes; 7/25/2022 COVID-19 Virus (Coronavirus) by PCR Nose; Future  - CBC with Platelets & Differential; Future  - Comprehensive metabolic panel; Future  - UA reflex to Microscopic and Culture; Future    2. Essential hypertension, benign  Stable on lisinopril    3. Moderate persistent asthma without complication  Stable without worsening on inhalers       Patient Instructions   PCR for COVID    CBC, comprehensive profile and UA to consider other diseases including tickborne diseases    Follow-up based on results            Return in about 4 months (around 11/28/2022) for using a video visit.       CHIEF COMPLAINT:  Chief Complaint   Patient presents with     URI       HISTORY OF PRESENT ILLNESS:  Kneney is a 62 year old male contacting the clinic today via phone for complaints of abrupt fatigue.  He felt well through July 24 but upon awakening July 25 felt overwhelming fatigue.  In addition to fatigue he has had nausea, anorexia, diarrhea, myalgias.  No documented fever.  No cough or shortness of breath.  Increased rhinorrhea.  No vomiting.  No hematochezia or rash.  [place]  No known exposure to COVID and nobody else at home sick.  No recent medications and no new recent antibiotics.  All medications old    REVIEW OF SYSTEMS:  No rash    PFSH:  Social History     Social History Narrative     Not on file      and lives with his family    TOBACCO USE:  History   Smoking Status     Never Smoker   Smokeless Tobacco     Former User     Types: Chew       VITALS:  There were no vitals filed for this  "visit.  There were no vitals taken for this visit. Estimated body mass index is 23.97 kg/m  as calculated from the following:    Height as of 4/13/21: 1.816 m (5' 11.5\").    Weight as of 10/26/21: 79.1 kg (174 lb 4.8 oz).    PHYSICAL EXAM:  (observations via Phone)  Alert and oriented    MEDICATIONS  Current Outpatient Medications   Medication Sig Dispense Refill     albuterol (PROAIR HFA/PROVENTIL HFA/VENTOLIN HFA) 108 (90 Base) MCG/ACT inhaler Inhale 1-2 puffs into the lungs every 4 hours as needed for shortness of breath / dyspnea or wheezing 8.5 g 11     budesonide-formoterol (SYMBICORT) 160-4.5 MCG/ACT Inhaler Inhale 2 puffs into the lungs 2 times daily 10.2 g 11     fluticasone-salmeterol (ADVAIR) 250-50 MCG/DOSE inhaler Inhale 1 puff into the lungs every 12 hours 60 each 3     lisinopril (ZESTRIL) 20 MG tablet Take 1 tablet (20 mg) by mouth daily 90 tablet 1     montelukast (SINGULAIR) 10 MG tablet TAKE 1 TABLET(10 MG) BY MOUTH AT BEDTIME 90 tablet 3       Notes summarized:   Labs, x-rays, cardiology, GI tests reviewed:   Recent Labs   Lab Test 10/26/21  1804      POTASSIUM 4.4   CR 0.98   PSA 1.09     No results found for: BPMZO21BEI  Lab Results   Component Value Date    CHOL 243 10/26/2021    CHOL 198 06/10/2020     New orders:   Orders Placed This Encounter   Procedures     Symptomatic; Yes; 7/25/2022 COVID-19 Virus (Coronavirus) by PCR Nose     Comprehensive metabolic panel     UA reflex to Microscopic and Culture     CBC with Platelets & Differential       Independent review of:  Supplemental history by:      Patient would like to receive their AVS by MyChart    [facility]    Jake Kilpatrick MD  [facility]    Phone Start Time: 9:43 AM  Phone End time:  10:12 AM  Conversation plus orders: 29 minutes  Dictation time:  3 minutes    The visit lasted a total of 31 minutes   " none

## 2024-01-26 ENCOUNTER — TELEPHONE (OUTPATIENT)
Dept: FAMILY MEDICINE | Facility: CLINIC | Age: 64
End: 2024-01-26
Payer: COMMERCIAL

## 2024-01-26 NOTE — TELEPHONE ENCOUNTER
A.S,  Patient calling to request updated letter.  Post office requires annual update to letter.  RN pended updated letter with today's date.  Umm LINARES RN

## 2024-01-26 NOTE — LETTER
January 26, 2024    Kenney Mcgowan  [address] [place] [facility] [address]           To Whom It May Concern:     Kenney Mcgowan is a patient under my medical care. It is advised to limit work with the following: limited to 8 hour workday for 5 days a week- ,starting 05/16/23 due to medical condition till indefinitely    If you have questions, please reach out to the clinic at [phone]        Sincerely,           Luisa Fonseca MD

## 2024-01-28 ENCOUNTER — MYC MEDICAL ADVICE (OUTPATIENT)
Dept: FAMILY MEDICINE | Facility: CLINIC | Age: 64
End: 2024-01-28
Payer: COMMERCIAL

## 2024-02-04 NOTE — TELEPHONE ENCOUNTER
FYI - Status Update    Who is Calling: patient    Update:  following up on request, stating its making it difficult on him if he dont have the updated letter. Please follow up soon    Does caller want a call/response back: Yes     Could we send this information to you in [employer] or would you prefer to receive a phone call?:   Patient would prefer a phone call   Okay to leave a detailed message?: Yes at Cell number on file:    Telephone Information:   Mobile [phone]

## 2024-02-05 DIAGNOSIS — I10 ESSENTIAL HYPERTENSION, BENIGN: ICD-10-CM

## 2024-02-05 RX ORDER — LISINOPRIL 20 MG/1
20 TABLET ORAL DAILY
Qty: 90 TABLET | Refills: 0 | Status: SHIPPED | OUTPATIENT
Start: 2024-02-05 | End: 2024-03-21

## 2024-02-05 NOTE — TELEPHONE ENCOUNTER
NAVDEEP SWAN requesting telephone visit with patient to discuss.  Can someone please help schedule?  Umm LINARES RN

## 2024-02-05 NOTE — TELEPHONE ENCOUNTER
Patient is scheduled tomorrow 2/6/24 at 5:30 for a Telephone visit  [facility] Unit Coordinator

## 2024-02-06 ENCOUNTER — VIRTUAL VISIT (OUTPATIENT)
Dept: FAMILY MEDICINE | Facility: CLINIC | Age: 64
End: 2024-02-06
Payer: COMMERCIAL

## 2024-02-06 DIAGNOSIS — U09.9 POST-COVID-19 SYNDROME MANIFESTING AS CHRONIC FATIGUE: Primary | ICD-10-CM

## 2024-02-06 DIAGNOSIS — G93.32 POST-COVID-19 SYNDROME MANIFESTING AS CHRONIC FATIGUE: Primary | ICD-10-CM

## 2024-02-06 DIAGNOSIS — M75.101 ROTATOR CUFF SYNDROME, RIGHT: ICD-10-CM

## 2024-02-06 PROCEDURE — 99443 PR PHYSICIAN TELEPHONE EVALUATION 21-30 MIN: CPT | Mod: 93 | Performed by: FAMILY MEDICINE

## 2024-02-06 NOTE — LETTER
February 6, 2024        Kenney Mcgowan  [address] [place] [address]           To Whom It May Concern:     Kenney Mcgowan is a patient under my medical care. It is advised to limit work with the following: limited to 8 hour workday for 5 days a week- ,starting 05/16/23 due to medical condition till indefinitely     If you have questions, please reach out to the clinic at [phone]        Sincerely,           Luisa Fonseca MD.      Sincerely,

## 2024-02-06 NOTE — PROGRESS NOTES
"Garcia is a 63 year old who is being evaluated via a billable telephone visit.      What phone number would you like to be contacted at?   How would you like to obtain your AVS? Jared    Distant Location (provider location):  On-site    Assessment & Plan   63 year old male needs work hr restriction due to known history of post covid fatigue and also in need for further evaluation for Rt shoulder pain   1. Post-COVID-19 syndrome manifesting as chronic fatigue  Plan: letter for work hr restrictions is sent    2. Rotator cuff syndrome, right  Plan: Proceed with non surgical orthopedic consultation to consider further management- including detail exam/ diagnostic clarification/ possible imaging and if needed IS  - Orthopedic  Referral; Future      He also reports  asthma well controlled without any inhaler and singular>> 1 yr   Futher appointment is made for recheck on Blood pressure and annual physical    I spent a total of 20 minutes on the day of the visit.   Time spent by me doing chart review, history and exam, documentation and further activities per the note      BMI  Estimated body mass index is 26.92 kg/m  as calculated from the following:    Height as of 7/14/23: 1.69 m (5' 6.54\").    Weight as of 9/6/23: 76.9 kg (169 lb 8 oz).             Subjective   Garcia is a 63 year old, presenting for the following health issues:  Forms (Requesting a letter)    HPI      at [facility] [employer]  Unbearable fatigue if has to work > 40 hrs a week  Has benefited from  work hour restrictions and needs letter sent again  Currently 8hrs work days 5 days a week- is just enough to prevent intolerable fatigue    He is right handed, and has been having moderate rt shoulder pain- since past 9 month  Shoulder pain is moderate to sever, unable to sleep on the affected shoulder  Wants to get IS injection  Has done home based therapy and PHYSICAL THERAPY  Tries to avoid activities aggravating it but iits hard  OTC medications only as " needed         Objective           Vitals:  No vitals were obtained today due to virtual visit.    Physical Exam   General: Alert and no distress //Respiratory: No audible wheeze, cough, or shortness of breath // Psychiatric:  Appropriate affect, tone, and pace of words            Phone call duration: 21 minutes  Signed Electronically by: Luisa Fonseca MD

## 2024-02-06 NOTE — PATIENT INSTRUCTIONS
annual physical with me at 7 am on 3/21   Booster for RSV, and you can get the covid and flu at the same times

## 2024-02-06 NOTE — PROGRESS NOTES
Garcia is a 63 year old who is being evaluated via a billable telephone visit.      What phone number would you like to be contacted at? [phone]   How would you like to obtain your AVS? MyChart  {PROVIDER LOCATION On-site should be selected for visits conducted from your clinic location or adjoining [facility] hospital, academic office, or other nearby [facility] building. Off-site should be selected for all other provider locations, including home:210981}  Distant Location (provider location):  {virtual location provider:573782}    {PROVIDER CHARTING PREFERENCE:928666}    Subjective   Garcia is a 63 year old, presenting for the following health issues:  Forms (Requesting a letter)  {(!) Visit Details have not yet been documented.  Please enter Visit Details and then use this list to pull in documentation. (Optional):712460}  HPI     {SUPERLIST (Optional):005770}  {additonal problems for provider to add (Optional):479162}    {ROS Picklists (Optional):158308}      Objective           Vitals:  No vitals were obtained today due to virtual visit.    Physical Exam   General: Alert and no distress //Respiratory: No audible wheeze, cough, or shortness of breath // Psychiatric:  Appropriate affect, tone, and pace of words      {Diagnostic Test Results (Optional):872091}      Phone call duration: *** minutes  Signed Electronically by: Luisa Fonseca MD  {Email feedback regarding this note to [email or web address]   :392803}

## 2024-02-06 NOTE — LETTER
02/06/24    Kenney Mcgowan  [address] [facility] [address]           To Whom It May Concern:     Kenney Mcgowan is a patient under my medical care.   He has a virtual visit with me 02/06/24 & I do recommend following work restrictions due to medical consition: limited to 8 hour workday for 5 days a week- ,starting 05/16/23 due to medical condition till indefinitely     If you have questions, please reach out to the clinic at [phone]        Sincerely,           Luisa Fonseca MD

## 2024-03-19 ENCOUNTER — OFFICE VISIT (OUTPATIENT)
Dept: PEDIATRICS | Facility: CLINIC | Age: 64
End: 2024-03-19
Payer: COMMERCIAL

## 2024-03-19 ENCOUNTER — NURSE TRIAGE (OUTPATIENT)
Dept: FAMILY MEDICINE | Facility: CLINIC | Age: 64
End: 2024-03-19

## 2024-03-19 VITALS
TEMPERATURE: 98.1 F | RESPIRATION RATE: 20 BRPM | BODY MASS INDEX: 27.7 KG/M2 | DIASTOLIC BLOOD PRESSURE: 71 MMHG | WEIGHT: 176.5 LBS | HEART RATE: 79 BPM | HEIGHT: 67 IN | SYSTOLIC BLOOD PRESSURE: 150 MMHG

## 2024-03-19 DIAGNOSIS — M54.6 ACUTE LEFT-SIDED THORACIC BACK PAIN: Primary | ICD-10-CM

## 2024-03-19 LAB
ALBUMIN UR-MCNC: NEGATIVE MG/DL
APPEARANCE UR: CLEAR
BASOPHILS # BLD AUTO: 0.1 10E3/UL (ref 0–0.2)
BASOPHILS NFR BLD AUTO: 1 %
BILIRUB UR QL STRIP: NEGATIVE
COLOR UR AUTO: YELLOW
EOSINOPHIL # BLD AUTO: 0.2 10E3/UL (ref 0–0.7)
EOSINOPHIL NFR BLD AUTO: 3 %
ERYTHROCYTE [DISTWIDTH] IN BLOOD BY AUTOMATED COUNT: 12.3 % (ref 10–15)
GLUCOSE UR STRIP-MCNC: NEGATIVE MG/DL
HCT VFR BLD AUTO: 43.1 % (ref 40–53)
HGB BLD-MCNC: 14.3 G/DL (ref 13.3–17.7)
HGB UR QL STRIP: NEGATIVE
IMM GRANULOCYTES # BLD: 0 10E3/UL
IMM GRANULOCYTES NFR BLD: 0 %
KETONES UR STRIP-MCNC: NEGATIVE MG/DL
LEUKOCYTE ESTERASE UR QL STRIP: ABNORMAL
LYMPHOCYTES # BLD AUTO: 2 10E3/UL (ref 0.8–5.3)
LYMPHOCYTES NFR BLD AUTO: 26 %
MCH RBC QN AUTO: 29.7 PG (ref 26.5–33)
MCHC RBC AUTO-ENTMCNC: 33.2 G/DL (ref 31.5–36.5)
MCV RBC AUTO: 90 FL (ref 78–100)
MONOCYTES # BLD AUTO: 0.8 10E3/UL (ref 0–1.3)
MONOCYTES NFR BLD AUTO: 11 %
NEUTROPHILS # BLD AUTO: 4.6 10E3/UL (ref 1.6–8.3)
NEUTROPHILS NFR BLD AUTO: 59 %
NITRATE UR QL: NEGATIVE
PH UR STRIP: 6 [PH] (ref 5–7)
PLATELET # BLD AUTO: 370 10E3/UL (ref 150–450)
RBC # BLD AUTO: 4.81 10E6/UL (ref 4.4–5.9)
RBC #/AREA URNS AUTO: NORMAL /HPF
SP GR UR STRIP: 1.01 (ref 1–1.03)
UROBILINOGEN UR STRIP-ACNC: 0.2 E.U./DL
WBC # BLD AUTO: 7.7 10E3/UL (ref 4–11)
WBC #/AREA URNS AUTO: NORMAL /HPF

## 2024-03-19 PROCEDURE — 81001 URINALYSIS AUTO W/SCOPE: CPT | Performed by: INTERNAL MEDICINE

## 2024-03-19 PROCEDURE — 99214 OFFICE O/P EST MOD 30 MIN: CPT | Performed by: INTERNAL MEDICINE

## 2024-03-19 PROCEDURE — 85025 COMPLETE CBC W/AUTO DIFF WBC: CPT | Performed by: INTERNAL MEDICINE

## 2024-03-19 PROCEDURE — 36415 COLL VENOUS BLD VENIPUNCTURE: CPT | Performed by: INTERNAL MEDICINE

## 2024-03-19 RX ORDER — METHYLPREDNISOLONE 4 MG
TABLET, DOSE PACK ORAL
Qty: 21 TABLET | Refills: 0 | Status: SHIPPED | OUTPATIENT
Start: 2024-03-19 | End: 2024-05-29

## 2024-03-19 ASSESSMENT — PAIN SCALES - GENERAL: PAINLEVEL: EXTREME PAIN (8)

## 2024-03-19 NOTE — TELEPHONE ENCOUNTER
Please call patient, on my schedule for 10. If he believe he has a kidney stone, I would direct him to [facility].

## 2024-03-19 NOTE — PROGRESS NOTES
ASSESSMENT:      1.  Acute onset of left sided flank pain.  Seems musculoskeletal, and is similar to episode last September.    Rule out pinched nerve, most probably.  Further imaging does not appear warranted at this time.      PLAN:  1.  Rest, stretching, heat, cold as needed   2.  Medrol dosepack  3.  Refill of tizanidine as needed, watch for sedation  4.  Referral to Physical Therapy, as needed.    Note for work    Greater than 30 minutes were spent on chart review, patient care and assessment, and other management of this patient for this visit.           Jacqueline Zelaya is a 63 year old, presenting for the following health issues:  Flank Pain (Patient here for left sided flank pain.)    HPI     Flank Pain  Onset/Duration: 3/18/2024   Description:   Character: Sharp  Location: left flank  Radiation: None  Intensity: moderate  Progression of Symptoms:  improving and constant  Accompanying Signs & Symptoms:  Fever/chills: no   Gas/Bloating: no   Nausea: no   Vomitting: no   Diarrhea: no   Constipation:no   Dysuria: no            Hematuria: no            Frequency: no            Incontinence of urine: no   History:            Last bowel movement: today  Trauma: no   Previous similar pain: no    Previous tests done: none           Previous Abdominal surgery: no   Precipitating factors:   Does the pain change with:     Food: no      Bowel Movement: no     Urination: no              Other factors: no   Therapies tried and outcome:  ibuprofen 600 mg last evening, helped get to sleep but work up in pain.       When food last eaten: 2 hours ago.    Last evening about 7 pm starting having L flank pain, after supper.   This happened on way to playing in a hockey game, and gradually worsened.   Was able to play the game, but not well.   Afterward, pain rated 8/10, and is persisting, which comes and goes.   Pain is positional, moving in bed very difficult last noc.    Teammate works in ED situation, advised evaluation  "for kidney stone.      Currently, no pain.   Had severe pain a few minutes ago.   No radiation.   Nontender to touch.   Physically active, no trauma or overuse.   , walks route, doesn't feel muscular.               Objective    BP (!) 150/71 (BP Location: Right arm, Patient Position: Sitting, Cuff Size: Adult Regular)   Pulse 79   Temp 98.1  F (36.7  C) (Temporal)   Resp 20   Ht 1.689 m (5' 6.5\")   Wt 80.1 kg (176 lb 8 oz)   PF 99 L/min   BMI 28.06 kg/m    Body mass index is 28.06 kg/m .  Physical Exam   GENERAL: alert and no distress  RESP: lungs clear to auscultation - no rales, rhonchi or wheezes  CV: regular heart tones  ABDOMEN: soft, nontender, no hepatosplenomegaly, no masses and bowel sounds normal  Back and CVA negative  Localizes pain   MS: no gross musculoskeletal defects noted  SKIN: no rashes on back    Results for orders placed or performed in visit on 03/19/24 (from the past 24 hour(s))   CBC with platelets differential    Narrative    The following orders were created for panel order CBC with platelets differential.  Procedure                               Abnormality         Status                     ---------                               -----------         ------                     CBC with platelets and d...[[id number]]                      Final result                 Please view results for these tests on the individual orders.   CBC with platelets and differential   Result Value Ref Range    WBC Count 7.7 4.0 - 11.0 10e3/uL    RBC Count 4.81 4.40 - 5.90 10e6/uL    Hemoglobin 14.3 13.3 - 17.7 g/dL    Hematocrit 43.1 40.0 - 53.0 %    MCV 90 78 - 100 fL    MCH 29.7 26.5 - 33.0 pg    MCHC 33.2 31.5 - 36.5 g/dL    RDW 12.3 10.0 - 15.0 %    Platelet Count 370 150 - 450 10e3/uL    % Neutrophils 59 %    % Lymphocytes 26 %    % Monocytes 11 %    % Eosinophils 3 %    % Basophils 1 %    % Immature Granulocytes 0 %    Absolute Neutrophils 4.6 1.6 - 8.3 10e3/uL    Absolute Lymphocytes " 2.0 0.8 - 5.3 10e3/uL    Absolute Monocytes 0.8 0.0 - 1.3 10e3/uL    Absolute Eosinophils 0.2 0.0 - 0.7 10e3/uL    Absolute Basophils 0.1 0.0 - 0.2 10e3/uL    Absolute Immature Granulocytes 0.0 <=0.4 10e3/uL   UA with Microscopic reflex to Culture    Specimen: Urine, Midstream   Result Value Ref Range    Color Urine Yellow Colorless, Straw, Light Yellow, Yellow    Appearance Urine Clear Clear    Glucose Urine Negative Negative mg/dL    Bilirubin Urine Negative Negative    Ketones Urine Negative Negative mg/dL    Specific Gravity Urine 1.015 1.003 - 1.035    Blood Urine Negative Negative    pH Urine 6.0 5.0 - 7.0    Protein Albumin Urine Negative Negative mg/dL    Urobilinogen Urine 0.2 0.2, 1.0 E.U./dL    Nitrite Urine Negative Negative    Leukocyte Esterase Urine Trace (A) Negative   UA Microscopic with Reflex to Culture   Result Value Ref Range    RBC Urine 0-2 0-2 /HPF /HPF    WBC Urine 0-5 0-5 /HPF /HPF    Narrative    Urine Culture not indicated           Signed Electronically by: Raj Harris MD

## 2024-03-19 NOTE — TELEPHONE ENCOUNTER
"  Nurse Triage SBAR    Is this a 2nd Level Triage? NO    Situation: left side aching     Background: NONE     Assessment: Patient thought he had a side ache on left side that started lastnight. He drink plenty of water and this had not resolved. A team member of his went through a professional \"intake\" site that indicated he may have kidney stones. He has never had these in the past. He does not have pain with urination. Sharp pain 8/10. This is right above belt line, below rib cage. Still has his appendix. He is able to drive and go about day but movements make this worse. Denies chest pain. Denies burning with urination, blood in urine, leg weakness, vomiting, pain in groin.     Protocol Recommended Disposition:   Go To ED/UCC Now (Or To Office With PCP Approval)    Recommendation:     [facility]      Encouraged ED with severe or uncontrolled pain. He declined this being that bad. He would like to be seen in [facility] in [place].     RN did education on emergency symptoms, when to seek more urgent care and encouraged to call RN triage back with changes.        Does the patient meet one of the following criteria for [facility] visit consideration? 16+ years old, with an [facility] PCP     TIP  Providers, please consider if this condition is appropriate for management at one of our Acute and Diagnostic Services sites.     If patient is a good candidate, please use dotphrase <dot>triageresponse and select Refer to [facility] to document.      Reason for Disposition   Patient sounds very sick or weak to the triager    Additional Information   Negative: Passed out (i.e., lost consciousness, collapsed and was not responding)   Negative: Shock suspected (e.g., cold/pale/clammy skin, too weak to stand, low BP, rapid pulse)   Negative: Sounds like a life-threatening emergency to the triager   Negative: Pain radiates into groin, scrotum   Negative: Blood in urine (red, pink, or tea-colored)   Negative: Vomiting   Negative: Weakness of a leg or foot (e.g., " "unable to bear weight, dragging foot)    Answer Assessment - Initial Assessment Questions  1. LOCATION: \"Where does it hurt?\" (e.g., left, right)      This is on left side.   2. ONSET: \"When did the pain start?\"      Lastnight   3. SEVERITY: \"How bad is the pain?\" (e.g., Scale 1-10; mild, moderate, or severe)    - MILD (1-3): doesn't interfere with normal activities     - MODERATE (4-7): interferes with normal activities or awakens from sleep     - SEVERE (8-10): excruciating pain and patient unable to do normal activities (stays in bed)        8/10   4. PATTERN: \"Does the pain come and go, or is it constant?\"       Comes and goes, movement makes it worse  5. CAUSE: \"What do you think is causing the pain?\"      Possible kidney stone   6. OTHER SYMPTOMS:  \"Do you have any other symptoms?\" (e.g., fever, abdomen pain, vomiting, leg weakness, burning with urination, blood in urine)      Denies all   7. PREGNANCY:  \"Is there any chance you are pregnant?\" \"When was your last menstrual period?\"      NA    Protocols used: Flank Pain-A-[place]    "

## 2024-03-19 NOTE — LETTER
March 19, 2024      Kenney Mcgowan  [address]  [facility] [address]        To Whom It May Concern:    Kenney Mcgowan  was seen on 3/19/2024 for problems with back pain.      Please excuse him from regular duties until 3/23/24 due to acute flare of musculoskeletal pain with a likely pinched nerve in his back.         Sincerely,        Raj Harris MD

## 2024-03-19 NOTE — TELEPHONE ENCOUNTER
Left message on answering machine for patient to call back to the nurse at [phone].    Please triage patient symptoms; reports kidney stone and abdominal pain - has appointment scheduled at 10am with [facility] provider - not appropriate for in clinic assessment, should be referred to [facility]     Violeta Dewitt RN  [facility]

## 2024-03-19 NOTE — PATIENT INSTRUCTIONS
PLAN:  1.  Rest, stretching, heat, cold as needed   2.  Medrol dosepack  3.  Refill of tizanidine as needed, watch for sedation  4.  Referral to Physical Therapy, as needed.

## 2024-03-19 NOTE — TELEPHONE ENCOUNTER
Pt was transferred to  triage nurse. [facility] clinic in [place] was called who will accept patient at 12:30 today. Pt agreed with this plan.

## 2024-03-19 NOTE — TELEPHONE ENCOUNTER
RN spoke with provider at [facility] who stated they will take this patient and will be calling him within a few minutes.     RN attempted to call patient to update with status. He did not answer. RN left a message that he will be receiving a call from the facility that we spoke about previously and also recommended that if pain is severe or uncontrolled, to seek care in ED. Encouraged call back with further questions.     Rosangela Mera RN on 3/19/2024 at 9:48 AM

## 2024-03-20 SDOH — HEALTH STABILITY: PHYSICAL HEALTH: ON AVERAGE, HOW MANY MINUTES DO YOU ENGAGE IN EXERCISE AT THIS LEVEL?: 150+ MIN

## 2024-03-20 SDOH — HEALTH STABILITY: PHYSICAL HEALTH: ON AVERAGE, HOW MANY DAYS PER WEEK DO YOU ENGAGE IN MODERATE TO STRENUOUS EXERCISE (LIKE A BRISK WALK)?: 5 DAYS

## 2024-03-20 ASSESSMENT — ASTHMA QUESTIONNAIRES
QUESTION_5 LAST FOUR WEEKS HOW WOULD YOU RATE YOUR ASTHMA CONTROL: WELL CONTROLLED
ACT_TOTALSCORE: 23
QUESTION_1 LAST FOUR WEEKS HOW MUCH OF THE TIME DID YOUR ASTHMA KEEP YOU FROM GETTING AS MUCH DONE AT WORK, SCHOOL OR AT HOME: NONE OF THE TIME
QUESTION_3 LAST FOUR WEEKS HOW OFTEN DID YOUR ASTHMA SYMPTOMS (WHEEZING, COUGHING, SHORTNESS OF BREATH, CHEST TIGHTNESS OR PAIN) WAKE YOU UP AT NIGHT OR EARLIER THAN USUAL IN THE MORNING: NOT AT ALL
ACT_TOTALSCORE: 23
QUESTION_4 LAST FOUR WEEKS HOW OFTEN HAVE YOU USED YOUR RESCUE INHALER OR NEBULIZER MEDICATION (SUCH AS ALBUTEROL): ONCE A WEEK OR LESS
QUESTION_2 LAST FOUR WEEKS HOW OFTEN HAVE YOU HAD SHORTNESS OF BREATH: NOT AT ALL

## 2024-03-20 ASSESSMENT — SOCIAL DETERMINANTS OF HEALTH (SDOH): HOW OFTEN DO YOU GET TOGETHER WITH FRIENDS OR RELATIVES?: ONCE A WEEK

## 2024-03-21 ENCOUNTER — OFFICE VISIT (OUTPATIENT)
Dept: FAMILY MEDICINE | Facility: CLINIC | Age: 64
End: 2024-03-21
Payer: COMMERCIAL

## 2024-03-21 ENCOUNTER — THERAPY VISIT (OUTPATIENT)
Dept: PHYSICAL THERAPY | Facility: CLINIC | Age: 64
End: 2024-03-21
Payer: COMMERCIAL

## 2024-03-21 VITALS
HEART RATE: 80 BPM | WEIGHT: 171.1 LBS | DIASTOLIC BLOOD PRESSURE: 84 MMHG | TEMPERATURE: 97 F | OXYGEN SATURATION: 99 % | SYSTOLIC BLOOD PRESSURE: 169 MMHG | RESPIRATION RATE: 16 BRPM | HEIGHT: 70 IN | BODY MASS INDEX: 24.5 KG/M2

## 2024-03-21 DIAGNOSIS — I10 HYPERTENSION GOAL BP (BLOOD PRESSURE) < 140/90: ICD-10-CM

## 2024-03-21 DIAGNOSIS — Z00.00 ROUTINE GENERAL MEDICAL EXAMINATION AT A HEALTH CARE FACILITY: Primary | ICD-10-CM

## 2024-03-21 DIAGNOSIS — S39.012S LUMBAR STRAIN, SEQUELA: ICD-10-CM

## 2024-03-21 DIAGNOSIS — Z12.11 SCREEN FOR COLON CANCER: ICD-10-CM

## 2024-03-21 DIAGNOSIS — Z12.5 SCREENING FOR PROSTATE CANCER: ICD-10-CM

## 2024-03-21 DIAGNOSIS — E78.5 HYPERLIPIDEMIA LDL GOAL <130: ICD-10-CM

## 2024-03-21 DIAGNOSIS — M54.6 ACUTE LEFT-SIDED THORACIC BACK PAIN: Primary | ICD-10-CM

## 2024-03-21 LAB
ALBUMIN SERPL BCG-MCNC: 4.6 G/DL (ref 3.5–5.2)
ALP SERPL-CCNC: 64 U/L (ref 40–150)
ALT SERPL W P-5'-P-CCNC: 19 U/L (ref 0–70)
ANION GAP SERPL CALCULATED.3IONS-SCNC: 10 MMOL/L (ref 7–15)
AST SERPL W P-5'-P-CCNC: 17 U/L (ref 0–45)
BILIRUB SERPL-MCNC: 0.3 MG/DL
BUN SERPL-MCNC: 17.1 MG/DL (ref 8–23)
CALCIUM SERPL-MCNC: 9.3 MG/DL (ref 8.8–10.2)
CHLORIDE SERPL-SCNC: 104 MMOL/L (ref 98–107)
CHOLEST SERPL-MCNC: 214 MG/DL
CREAT SERPL-MCNC: 0.94 MG/DL (ref 0.67–1.17)
DEPRECATED HCO3 PLAS-SCNC: 24 MMOL/L (ref 22–29)
EGFRCR SERPLBLD CKD-EPI 2021: >90 ML/MIN/1.73M2
FASTING STATUS PATIENT QL REPORTED: YES
GLUCOSE SERPL-MCNC: 96 MG/DL (ref 70–99)
HDLC SERPL-MCNC: 78 MG/DL
LDLC SERPL CALC-MCNC: 121 MG/DL
NONHDLC SERPL-MCNC: 136 MG/DL
POTASSIUM SERPL-SCNC: 4.2 MMOL/L (ref 3.4–5.3)
PROT SERPL-MCNC: 6.8 G/DL (ref 6.4–8.3)
PSA SERPL DL<=0.01 NG/ML-MCNC: 1.06 NG/ML (ref 0–4.5)
SODIUM SERPL-SCNC: 138 MMOL/L (ref 135–145)
TRIGL SERPL-MCNC: 76 MG/DL

## 2024-03-21 PROCEDURE — 91320 SARSCV2 VAC 30MCG TRS-SUC IM: CPT | Performed by: FAMILY MEDICINE

## 2024-03-21 PROCEDURE — 99214 OFFICE O/P EST MOD 30 MIN: CPT | Mod: 25 | Performed by: FAMILY MEDICINE

## 2024-03-21 PROCEDURE — 90471 IMMUNIZATION ADMIN: CPT | Performed by: FAMILY MEDICINE

## 2024-03-21 PROCEDURE — 80061 LIPID PANEL: CPT | Performed by: FAMILY MEDICINE

## 2024-03-21 PROCEDURE — 36415 COLL VENOUS BLD VENIPUNCTURE: CPT | Performed by: FAMILY MEDICINE

## 2024-03-21 PROCEDURE — 90480 ADMN SARSCOV2 VAC 1/ONLY CMP: CPT | Performed by: FAMILY MEDICINE

## 2024-03-21 PROCEDURE — 80053 COMPREHEN METABOLIC PANEL: CPT | Performed by: FAMILY MEDICINE

## 2024-03-21 PROCEDURE — 99396 PREV VISIT EST AGE 40-64: CPT | Mod: 25 | Performed by: FAMILY MEDICINE

## 2024-03-21 PROCEDURE — 97110 THERAPEUTIC EXERCISES: CPT | Mod: GP

## 2024-03-21 PROCEDURE — G0103 PSA SCREENING: HCPCS | Performed by: FAMILY MEDICINE

## 2024-03-21 PROCEDURE — 97530 THERAPEUTIC ACTIVITIES: CPT | Mod: GP

## 2024-03-21 PROCEDURE — 97161 PT EVAL LOW COMPLEX 20 MIN: CPT | Mod: GP

## 2024-03-21 PROCEDURE — 90750 HZV VACC RECOMBINANT IM: CPT | Performed by: FAMILY MEDICINE

## 2024-03-21 RX ORDER — ATORVASTATIN CALCIUM 20 MG/1
20 TABLET, FILM COATED ORAL DAILY
Qty: 90 TABLET | Refills: 3 | Status: SHIPPED | OUTPATIENT
Start: 2024-03-21

## 2024-03-21 RX ORDER — LISINOPRIL 20 MG/1
30 TABLET ORAL DAILY
COMMUNITY
Start: 2024-03-21 | End: 2024-04-04

## 2024-03-21 ASSESSMENT — PAIN SCALES - GENERAL: PAINLEVEL: NO PAIN (0)

## 2024-03-21 NOTE — PROGRESS NOTES
PHYSICAL THERAPY EVALUATION  Type of Visit: Evaluation    See electronic medical record for Abuse and Falls Screening details.    Noticed L sided lower thoracic, upper lumber pain Monday night. Does not remember any specific injury. Has been improving with rest and medication over the past few days. Now has some aching with movement, especially transferring sit > stand. Has been trying some yoga stretching over the last few days. The pain does not radiate anywhere, stays over L flank, more of a sharp pain.     Subjective       Presenting condition or subjective complaint:    Date of onset: 03/18/24 (date of injury)    Relevant medical history:     Dates & types of surgery:      Prior diagnostic imaging/testing results:       Prior therapy history for the same diagnosis, illness or injury:        Living Environment  Social support:     Type of home:     Stairs to enter the home:         Ramp:     Stairs inside the home:         Help at home:    Equipment owned:       Employment:      Hobbies/Interests:      Patient goals for therapy:      Pain assessment: See objective evaluation for additional pain details     Objective   LUMBAR SPINE EVALUATION  PAIN: Pain Level at Rest: 0/10  Pain Level with Use: 6/10  Pain Location: lumbar spine  Pain is Exacerbated By: standing up, reaching for objects.   Pain is Relieved By: rest and medication.   INTEGUMENTARY (edema, incisions): WNL  POSTURE: WNL  BALANCE/PROPRIOCEPTION: WNL  ROM:   (Degrees) Left AROM Left PROM  Right AROM Right PROM   Hip Flexion  100  90   Hip Extension       Hip Abduction       Hip Adduction       Hip Internal Rotation       Hip External Rotation       Knee Flexion       Knee Extension       Lumbar Side glide Stiffness Stiffness, pain over L side   Lumbar Flexion Fingers to mid shin; no pain   Lumbar Extension Wnl     PELVIC/SI SCREEN: WNL    MYOTOMES:    Left Right   T12-L3 (Hip Flexion) 5 5   L2-4 (Quads)  5 5   L4 (Ankle DF) 5 5   L5 (Great Toe Ext) 5  5   S1 (Toe Raise) 5 5     DTR S:    Left Right   L4 (Quad) 2 2   S1 (Achilles) 0 0     DERMATOMES: WNL  NEURAL TENSION: Lumbar WNL  FLEXIBILITY: Decreased hip IR L, Decreased hip ER L, Decreased hip flexors L, Decreased hip IR R, Decreased hip ER R, Decreased hip flexors R  LUMBAR/HIP Special Tests:    Left Right   NEEL Negative  Negative    FADIR/Labrum/SHEFALI Negative  Negative    Piriformis Negative  Negative    Quadrant Testing Positive Positive, more painful on L   SLR Negative  Negative    Slump Negative  Negative    Alex Negative  Negative       PELVIS/SI SPECIAL TESTS: WNL  PALPATION: No TTP in thoracic or lumbar spine.   SPINAL SEGMENTAL CONCLUSIONS:  hypomobile  throughout. Significant limitation in thoracic spine. Increased kyphosis.       Assessment & Plan   CLINICAL IMPRESSIONS  Medical Diagnosis: Acute left-sided thoracic back pain    Treatment Diagnosis: Acute left-sided thoracic back pain, quadratus lumorum strain   Impression/Assessment: Patient is a 63 year old male with L lower back complaints.  The following significant findings have been identified: Pain, Decreased ROM/flexibility, Decreased joint mobility, Impaired muscle performance, Decreased activity tolerance, and Impaired posture. These impairments interfere with their ability to perform self care tasks and work tasks as compared to previous level of function.     Clinical Decision Making (Complexity):  Clinical Presentation: Stable/Uncomplicated  Clinical Presentation Rationale: based on medical and personal factors listed in PT evaluation  Clinical Decision Making (Complexity): Low complexity    PLAN OF CARE  Treatment Interventions:  Modalities: Cryotherapy, Dry Needling, E-stim, Hot Pack  Interventions: Manual Therapy, Neuromuscular Re-education, Therapeutic Activity, Therapeutic Exercise    Long Term Goals     PT Goal 1  Goal Identifier: HEP  Goal Description: Patient will be independent with HEP by discharge to improve status  outside of therapy  Rationale: to maximize safety and independence with performance of ADLs and functional tasks  Target Date: 05/21/24  PT Goal 2  Goal Identifier: pain  Goal Description: patient will improve max pain level from 6/10 to 3/10 or less  Rationale: to maximize safety and independence with performance of ADLs and functional tasks  Target Date: 04/21/24      Frequency of Treatment: 1x a month for two months  Duration of Treatment: 2 months    Recommended Referrals to Other Professionals:  none  Education Assessment:   Learner/Method: Patient;Listening;Demonstration;Pictures/Video;No Barriers to Learning    Risks and benefits of evaluation/treatment have been explained.   Patient/Family/caregiver agrees with Plan of Care.     Evaluation Time:     PT Eval, Low Complexity Minutes (45947): 22     Signing Clinician: Karol Ghotra PT

## 2024-03-21 NOTE — PROGRESS NOTES
Preventive Care Visit  [facility]  Luisa Fonseca MD, Family Medicine  Mar 21, 2024      Assessment & Plan     1. Routine general medical examination at a health care facility  Due for colonoscopy- and has plans for completetion by summer or earlier    2. Screen for colon cancer  Plan:- Colonoscopy Screening  Referral; Future    3. Screening for prostate cancer  - PSA, screen; Future  - PSA, screen    4. Lumbar strain, sequela  Plan: PHYSICAL THERAPY , relative rest. Up for unresolved concerns in 1 month    5. Hypertension goal BP (blood pressure) < 140/90  Plan: uncontrolled Blood pressure  Advised to increase the dose of lisinopril from  20 to 30 mg once daily  Telephone only appointment in 3-4 weeks    - lisinopril (ZESTRIL) 20 MG tablet; Take 1.5 tablets (30 mg) by mouth daily  - Comprehensive metabolic panel (BMP + Alb, Alk Phos, ALT, AST, Total. Bili, TP); Future  - Comprehensive metabolic panel (BMP + Alb, Alk Phos, ALT, AST, Total. Bili, TP)    6. Hyperlipidemia LDL goal <130  Plan: fasting for labs . Advised to start atorvastatin as well  - Lipid Profile (Chol, Trig, HDL, LDL calc); Future  - atorvastatin (LIPITOR) 20 MG tablet; Take 1 tablet (20 mg) by mouth daily  Dispense: 90 tablet; Refill: 3  - Lipid Profile (Chol, Trig, HDL, LDL calc)      Patient has been advised of split billing requirements and indicates understanding: Yes  Ordering of each unique test  Prescription drug management        Counseling  Appropriate preventive services were discussed with this patient, including applicable screening as appropriate for fall prevention, nutrition, physical activity, Tobacco-use cessation, weight loss and cognition.  Checklist reviewing preventive services available has been given to the patient.  Reviewed patient's diet, addressing concerns and/or questions.   The patient was instructed to see the dentist every 6 months.   He is at risk for psychosocial distress and has  been provided with information to reduce risk.   The patient reports drinking more than one alcoholic drink per day and sometimes engages in binge or excessive drinking. The patient was counseled and given information about possible harmful effects of excessive alcohol intake as well as where to get help for alcohol problems.     Work on weight loss  Regular exercise    Subjective   Garcia is a 63 year old, presenting for the following:  Physical        3/21/2024     6:50 AM   Additional Questions   Roomed by [facility] Care Directive  Patient does not have a Health Care Directive or Living Will: Discussed advance care planning with patient; however, patient declined at this time.    HPI          3/20/2024   General Health   How would you rate your overall physical health? Good   Feel stress (tense, anxious, or unable to sleep) Only a little   (!) STRESS CONCERN      3/20/2024   Nutrition   Three or more servings of calcium each day? Yes   Diet: Regular (no restrictions)   How many servings of fruit and vegetables per day? (!) 0-1   How many sweetened beverages each day? 0-1         3/20/2024   Exercise   Days per week of moderate/strenous exercise 5 days   Average minutes spent exercising at this level 150+ min         3/20/2024   Social Factors   Frequency of gathering with friends or relatives Once a week   Worry food won't last until get money to buy more No   Food not last or not have enough money for food? No   Do you have housing?  Yes   Are you worried about losing your housing? No   Lack of transportation? No   Unable to get utilities (heat,electricity)? No         3/20/2024   Fall Risk   Fallen 2 or more times in the past year? No   Trouble with walking or balance? No          3/20/2024   Dental   Dentist two times every year? (!) NO            Today's PHQ-2 Score:       3/20/2024     4:54 PM   PHQ-2 ( 1999 [employer])   Q1: Little interest or pleasure in doing things 0   Q2: Feeling down, depressed or  hopeless 0   PHQ-2 Score 0   Q1: Little interest or pleasure in doing things Not at all   Q2: Feeling down, depressed or hopeless Not at all   PHQ-2 Score 0           3/20/2024   Substance Use   Alcohol more than 3/day or more than 7/wk Yes   How often do you have a drink containing alcohol 2 to 3 times a week   How many alcohol drinks on typical day 1 or 2   How often do you have 5+ drinks at one occasion Monthly   Audit 2/3 Score 2   How often not able to stop drinking once started Never   How often failed to do what normally expected Never   How often needed first drink in am after a heavy drinking session Never   How often feeling of guilt or remorse after drinking Never   How often unable to remember what happened the night before Never   Have you or someone else been injured because of your drinking No   Has anyone been concerned or suggested you cut down on drinking No   TOTAL SCORE - AUDIT 5   Do you use any other substances recreationally? (!) CANNABIS PRODUCTS     Social History     Tobacco Use    Smoking status: Never    Smokeless tobacco: Former     Types: Chew   Vaping Use    Vaping Use: Never used   Substance Use Topics    Alcohol use: Yes     Comment: occassionally    Drug use: No           3/20/2024   STI Screening   New sexual partner(s) since last STI/HIV test? No   Last PSA:   PSA   Date Value Ref Range Status   07/06/2012 0.80 0 - 4 ug/L Final     Prostate Specific Antigen Screen   Date Value Ref Range Status   02/15/2023 0.93 0.00 - 4.50 ng/mL Final   10/26/2021 1.09 0.00 - 4.00 ug/L Final     ASCVD Risk   The 10-year ASCVD risk score ([place]ake COLLIER, et al., 2019) is: 13.5%    Values used to calculate the score:      Age: 63 years      Sex: Male      Is Non- : No      Diabetic: No      Tobacco smoker: No      Systolic Blood Pressure: 167 mmHg      Is BP treated: Yes      HDL Cholesterol: 90 mg/dL      Total Cholesterol: 243 mg/dL          Reviewed and updated as  "needed this visit by Provider   Tobacco  Allergies  Meds  Problems  Med Hx  Surg Hx  Fam Hx            BP Readings from Last 3 Encounters:   03/21/24 (!) 167/74   03/19/24 (!) 150/71   09/06/23 (!) 142/78    Wt Readings from Last 3 Encounters:   03/21/24 77.6 kg (171 lb 1.6 oz)   03/19/24 80.1 kg (176 lb 8 oz)   09/06/23 76.9 kg (169 lb 8 oz)                      Review of Systems  Constitutional, neuro, ENT, endocrine, pulmonary, cardiac, gastrointestinal, genitourinary, musculoskeletal, integument and psychiatric systems are negative, except as otherwise noted.     Objective    Exam  BP (!) 167/74   Pulse 80   Temp 97  F (36.1  C) (Temporal)   Resp 16   Ht 1.784 m (5' 10.25\")   Wt 77.6 kg (171 lb 1.6 oz)   SpO2 99%   BMI 24.38 kg/m     Estimated body mass index is 24.38 kg/m  as calculated from the following:    Height as of this encounter: 1.784 m (5' 10.25\").    Weight as of this encounter: 77.6 kg (171 lb 1.6 oz).    Physical Exam  GENERAL: alert and no distress  EYES: Eyes grossly normal to inspection, PERRL and conjunctivae and sclerae normal  HENT: ear canals and TM's normal, nose and mouth without ulcers or lesions  NECK: no adenopathy, no asymmetry, masses, or scars  RESP: lungs clear to auscultation - no rales, rhonchi or wheezes  CV: regular rate and rhythm, normal S1 S2, no S3 or S4, no murmur, click or rub, no peripheral edema  ABDOMEN: soft, nontender, no hepatosplenomegaly, no masses and bowel sounds normal  MS: no gross musculoskeletal defects noted, no edema  SKIN: no suspicious lesions or rashes  NEURO: Normal strength and tone, mentation intact and speech normal  PSYCH: mentation appears normal, affect normal/bright        Signed Electronically by: Luisa Fonseca MD    "

## 2024-03-21 NOTE — PATIENT INSTRUCTIONS
Preventive Care Advice   This is general advice given by our system to help you stay healthy. However, your care team may have specific advice just for you. Please talk to your care team about your preventive care needs.  Nutrition  Eat 5 or more servings of fruits and vegetables each day.  Try wheat bread, brown rice and whole grain pasta (instead of white bread, rice, and pasta).  Get enough calcium and vitamin D. Check the label on foods and aim for 100% of the RDA (recommended daily allowance).  Lifestyle  Exercise at least 150 minutes each week   (30 minutes a day, 5 days a week).  Do muscle strengthening activities 2 days a week. These help control your weight and prevent disease.  No smoking.  Wear sunscreen to prevent skin cancer.  Have a dental exam and cleaning every 6 months.  Yearly exams  See your health care team every year to talk about:  Any changes in your health.  Any medicines your care team has prescribed.  Preventive care, family planning, and ways to prevent chronic diseases.  Shots (vaccines)   HPV shots (up to age 26), if you've never had them before.  Hepatitis B shots (up to age 59), if you've never had them before.  COVID-19 shot: Get this shot when it's due.  Flu shot: Get a flu shot every year.  Tetanus shot: Get a tetanus shot every 10 years.  Pneumococcal, hepatitis A, and RSV shots: Ask your care team if you need these based on your risk.  Shingles shot (for age 50 and up).  General health tests  Diabetes screening:  Starting at age 35, Get screened for diabetes at least every 3 years.  If you are younger than age 35, ask your care team if you should be screened for diabetes.  Cholesterol test: At age 39, start having a cholesterol test every 5 years, or more often if advised.  Bone density scan (DEXA): At age 50, ask your care team if you should have this scan for osteoporosis (brittle bones).  Hepatitis C: Get tested at least once in your life.  STIs (sexually transmitted  infections)  Before age 24: Ask your care team if you should be screened for STIs.  After age 24: Get screened for STIs if you're at risk. You are at risk for STIs (including HIV) if:  You are sexually active with more than one person.  You don't use condoms every time.  You or a partner was diagnosed with a sexually transmitted infection.  If you are at risk for HIV, ask about PrEP medicine to prevent HIV.  Get tested for HIV at least once in your life, whether you are at risk for HIV or not.  Cancer screening tests  Cervical cancer screening: If you have a cervix, begin getting regular cervical cancer screening tests at age 21. Most people who have regular screenings with normal results can stop after age 65. Talk about this with your provider.  Breast cancer scan (mammogram): If you've ever had breasts, begin having regular mammograms starting at age 40. This is a scan to check for breast cancer.  Colon cancer screening: It is important to start screening for colon cancer at age 45.  Have a colonoscopy test every 10 years (or more often if you're at risk) Or, ask your provider about stool tests like a FIT test every year or Cologuard test every 3 years.  To learn more about your testing options, visit: https://www.[facility]/995368.pdf.  For help making a decision, visit: https://bit.ly/om57091.  Prostate cancer screening test: If you have a prostate and are age 55 to 69, ask your provider if you would benefit from a yearly prostate cancer screening test.  Lung cancer screening: If you are a current or former smoker age 50 to 80, ask your care team if ongoing lung cancer screenings are right for you.  For informational purposes only. Not to replace the advice of your health care provider. Copyright   2023 [employer]. All rights reserved. Clinically reviewed by the [facility] Transitions Program. [employer] 831710 - REV 01/24.    Learning About Stress  What is stress?     Stress is your  body's response to a hard situation. Your body can have a physical, emotional, or mental response. Stress is a fact of life for most people, and it affects everyone differently. What causes stress for you may not be stressful for someone else.  A lot of things can cause stress. You may feel stress when you go on a job interview, take a test, or run a race. This kind of short-term stress is normal and even useful. It can help you if you need to work hard or react quickly. For example, stress can help you finish an important job on time.  Long-term stress is caused by ongoing stressful situations or events. Examples of long-term stress include long-term health problems, ongoing problems at work, or conflicts in your family. Long-term stress can harm your health.  How does stress affect your health?  When you are stressed, your body responds as though you are in danger. It makes hormones that speed up your heart, make you breathe faster, and give you a burst of energy. This is called the fight-or-flight stress response. If the stress is over quickly, your body goes back to normal and no harm is done.  But if stress happens too often or lasts too long, it can have bad effects. Long-term stress can make you more likely to get sick, and it can make symptoms of some diseases worse. If you tense up when you are stressed, you may develop neck, shoulder, or low back pain. Stress is linked to high blood pressure and heart disease.  Stress also harms your emotional health. It can make you bolaños, tense, or depressed. Your relationships may suffer, and you may not do well at work or school.  What can you do to manage stress?  You can try these things to help manage stress:   Do something active. Exercise or activity can help reduce stress. Walking is a great way to get started. Even everyday activities such as housecleaning or yard work can help.  Try yoga or jason chi. These techniques combine exercise and meditation. You may need  some training at first to learn them.  Do something you enjoy. For example, listen to music or go to a movie. Practice your hobby or do volunteer work.  Meditate. This can help you relax, because you are not worrying about what happened before or what may happen in the future.  Do guided imagery. Imagine yourself in any setting that helps you feel calm. You can use online videos, books, or a teacher to guide you.  Do breathing exercises. For example:  From a standing position, bend forward from the waist with your knees slightly bent. Let your arms dangle close to the floor.  Breathe in slowly and deeply as you return to a standing position. Roll up slowly and lift your head last.  Hold your breath for just a few seconds in the standing position.  Breathe out slowly and bend forward from the waist.  Let your feelings out. Talk, laugh, cry, and express anger when you need to. Talking with supportive friends or family, a counselor, or a aspen leader about your feelings is a healthy way to relieve stress. Avoid discussing your feelings with people who make you feel worse.  Write. It may help to write about things that are bothering you. This helps you find out how much stress you feel and what is causing it. When you know this, you can find better ways to cope.  What can you do to prevent stress?  You might try some of these things to help prevent stress:  Manage your time. This helps you find time to do the things you want and need to do.  Get enough sleep. Your body recovers from the stresses of the day while you are sleeping.  Get support. Your family, friends, and community can make a difference in how you experience stress.  Limit your news feed. Avoid or limit time on social media or news that may make you feel stressed.  Do something active. Exercise or activity can help reduce stress. Walking is a great way to get started.  Where can you learn more?  Go to https://www.healthwise.net/patiented  Enter N032 in the  "search box to learn more about \"Learning About Stress.\"  Current as of: October 24, 2023               Content Version: 14.0    8857-1790 [employer].   Care instructions adapted under license by your healthcare professional. If you have questions about a medical condition or this instruction, always ask your healthcare professional. [employer] disclaims any warranty or liability for your use of this information.      Learning About Alcohol Use Disorder  What is alcohol use disorder?  Alcohol use disorder means that a person drinks alcohol even though it causes harm to themselves or others. It can range from mild to severe. The more symptoms of this disorder you have, the more severe it may be. People who have it may find it hard to control their use of alcohol.  People who have this disorder may argue with others about how much they're drinking. Their job may be affected because of drinking. They may drink when it's dangerous or illegal, such as when they drive. They also may have a strong need, or craving, to drink. They may feel like they must drink just to get by. Their drinking may increase their risk of getting hurt or being in a car crash.  Over time, drinking too much alcohol may cause health problems. These may include high blood pressure, liver problems, or problems with digestion.  What are the symptoms?  Maybe you've wondered about your alcohol habits or how to tell if your drinking is becoming a problem.  Here are some of the symptoms of alcohol use disorder. You may have it if you have two or more of the following symptoms:  You drink larger amounts of alcohol than you ever meant to. Or you've been drinking for a longer time than you ever meant to.  You can't cut down or control your use. Or you constantly wish you could cut down.  You spend a lot of time getting or drinking alcohol or recovering from its effects.  You have strong cravings for alcohol.  You can no longer do " your main jobs at work, at school, or at home.  You keep drinking alcohol, even though your use hurts your relationships.  You have stopped doing important activities because of your alcohol use.  You drink alcohol in situations where doing so is dangerous.  You keep drinking alcohol even though you know it's causing health problems.  You need more and more alcohol to get the same effect, or you get less effect from the same amount over time. This is called tolerance.  You have uncomfortable symptoms when you stop drinking alcohol or use less. This is called withdrawal.  Alcohol use disorder can range from mild to severe. The more symptoms you have, the more severe the disorder may be.  You might not realize that your drinking is a problem. You might not drink large amounts when you drink. Or you might go for days or weeks between drinking episodes. But even if you don't drink very often, your drinking could still be harmful and put you at risk.  How is alcohol use disorder treated?  Getting help is up to you. But you don't have to do it alone. There are many people and kinds of treatments that can help.  Treatment for alcohol use disorder can include:  Group therapy, one or more types of counseling, and alcohol education.  Medicines that help to:  Reduce withdrawal symptoms and help you safely stop drinking.  Reduce cravings for alcohol.  Support groups. These groups include Alcoholics Anonymous and GLG (Self-Management and Recovery Training).  Some people are able to stop or cut back on drinking with help from a counselor. People who have moderate to severe alcohol use disorder may need medical treatment. They may need to stay in a hospital or treatment center.  You may have a treatment team to help you. This team may include a psychologist or psychiatrist, counselors, doctors, social workers, nurses, and a . A  helps plan and manage your treatment.  Follow-up care is a key part  "of your treatment and safety. Be sure to make and go to all appointments, and call your doctor if you are having problems. It's also a good idea to know your test results and keep a list of the medicines you take.  Where can you learn more?  Go to https://www.[employer].net/patiented  Enter H758 in the search box to learn more about \"Learning About Alcohol Use Disorder.\"  Current as of: November 15, 2023               Content Version: 14.0    7268-9675 [employer].   Care instructions adapted under license by your healthcare professional. If you have questions about a medical condition or this instruction, always ask your healthcare professional. [employer] disclaims any warranty or liability for your use of this information.      Substance Use Disorder: Care Instructions  Overview     You can improve your life and health by stopping your use of alcohol or drugs. When you don't drink or use drugs, you may feel and sleep better. You may get along better with your family, friends, and coworkers. There are medicines and programs that can help with substance use disorder.  How can you care for yourself at home?  Here are some ways to help you stay sober and prevent relapse.  If you have been given medicine to help keep you sober or reduce your cravings, be sure to take it exactly as prescribed.  Talk to your doctor about programs that can help you stop using drugs or drinking alcohol.  Do not keep alcohol or drugs in your home.  Plan ahead. Think about what you'll say if other people ask you to drink or use drugs. Try not to spend time with people who drink or use drugs.  Use the time and money spent on drinking or drugs to do something that's important to you.  Preventing a relapse  Have a plan to deal with relapse. Learn to recognize changes in your thinking that lead you to drink or use drugs. Get help before you start to drink or use drugs again.  Try to stay away from situations, friends, " or places that may lead you to drink or use drugs.  If you feel the need to drink alcohol or use drugs again, seek help right away. Call a trusted friend or family member. Some people get support from organizations such as Narcotics Anonymous or [employer] or from treatment facilities.  If you relapse, get help as soon as you can. Some people make a plan with another person that outlines what they want that person to do for them if they relapse. The plan usually includes how to handle the relapse and who to notify in case of relapse.  Don't give up. Remember that a relapse doesn't mean that you have failed. Use the experience to learn the triggers that lead you to drink or use drugs. Then quit again. Recovery is a lifelong process. Many people have several relapses before they are able to quit for good.  Follow-up care is a key part of your treatment and safety. Be sure to make and go to all appointments, and call your doctor if you are having problems. It's also a good idea to know your test results and keep a list of the medicines you take.  When should you call for help?   Call 441  anytime you think you may need emergency care. For example, call if you or someone else:    Has overdosed or has withdrawal signs. Be sure to tell the emergency workers that you are or someone else is using or trying to quit using drugs. Overdose or withdrawal signs may include:  Losing consciousness.  Seizure.  Seeing or hearing things that aren't there (hallucinations).     Is thinking or talking about suicide or harming others.   Where to get help 24 hours a day, 7 days a week   If you or someone you know talks about suicide, self-harm, a mental health crisis, a substance use crisis, or any other kind of emotional distress, get help right away. You can:    Call the Suicide and Crisis Lifeline at 009.     Call 0-172-151-TALK (1-576.998.8134).     Text HOME to 830631 to access the Crisis Text Line.   Consider saving these numbers  "in your phone.  Go to [employer] for more information or to chat online.  Call your doctor now or seek immediate medical care if:    You are having withdrawal symptoms. These may include nausea or vomiting, sweating, shakiness, and anxiety.   Watch closely for changes in your health, and be sure to contact your doctor if:    You have a relapse.     You need more help or support to stop.   Where can you learn more?  Go to https://www.[employer].net/patiented  Enter H573 in the search box to learn more about \"Substance Use Disorder: Care Instructions.\"  Current as of: November 15, 2023               Content Version: 14.0    0202-1386 [employer].   Care instructions adapted under license by your healthcare professional. If you have questions about a medical condition or this instruction, always ask your healthcare professional. [employer] disclaims any warranty or liability for your use of this information.      "

## 2024-03-29 DIAGNOSIS — Z12.11 COLON CANCER SCREENING: ICD-10-CM

## 2024-04-04 DIAGNOSIS — J45.40 MODERATE PERSISTENT ASTHMA WITHOUT COMPLICATION: Primary | ICD-10-CM

## 2024-04-04 DIAGNOSIS — I10 HYPERTENSION GOAL BP (BLOOD PRESSURE) < 140/90: ICD-10-CM

## 2024-04-05 RX ORDER — MONTELUKAST SODIUM 10 MG/1
10 TABLET ORAL AT BEDTIME
Qty: 90 TABLET | Refills: 3 | Status: SHIPPED | OUTPATIENT
Start: 2024-04-05 | End: 2024-08-23

## 2024-04-05 RX ORDER — FLUTICASONE PROPIONATE AND SALMETEROL 250; 50 UG/1; UG/1
1 POWDER RESPIRATORY (INHALATION) EVERY 12 HOURS
Qty: 1 EACH | Refills: 11 | Status: SHIPPED | OUTPATIENT
Start: 2024-04-05

## 2024-04-05 RX ORDER — LISINOPRIL 20 MG/1
30 TABLET ORAL DAILY
Qty: 90 TABLET | Refills: 1 | Status: SHIPPED | OUTPATIENT
Start: 2024-04-05 | End: 2024-05-29

## 2024-04-08 DIAGNOSIS — I10 HYPERTENSION GOAL BP (BLOOD PRESSURE) < 140/90: ICD-10-CM

## 2024-04-08 RX ORDER — FLUTICASONE PROPIONATE AND SALMETEROL 250; 50 UG/1; UG/1
1 POWDER RESPIRATORY (INHALATION) EVERY 12 HOURS
Qty: 1 EACH | Refills: 11 | OUTPATIENT
Start: 2024-04-08

## 2024-04-08 RX ORDER — LISINOPRIL 20 MG/1
30 TABLET ORAL DAILY
Qty: 90 TABLET | Refills: 1 | OUTPATIENT
Start: 2024-04-08

## 2024-04-08 RX ORDER — MONTELUKAST SODIUM 10 MG/1
10 TABLET ORAL AT BEDTIME
Qty: 90 TABLET | Refills: 3 | OUTPATIENT
Start: 2024-04-08

## 2024-04-12 ENCOUNTER — ORDERS ONLY (AUTO-RELEASED) (OUTPATIENT)
Dept: FAMILY MEDICINE | Facility: CLINIC | Age: 64
End: 2024-04-12
Payer: COMMERCIAL

## 2024-04-12 DIAGNOSIS — Z12.11 COLON CANCER SCREENING: ICD-10-CM

## 2024-04-30 ENCOUNTER — MYC MEDICAL ADVICE (OUTPATIENT)
Dept: FAMILY MEDICINE | Facility: CLINIC | Age: 64
End: 2024-04-30
Payer: COMMERCIAL

## 2024-05-10 LAB — NONINV COLON CA DNA+OCC BLD SCRN STL QL: NEGATIVE

## 2024-05-15 PROBLEM — M54.6 ACUTE LEFT-SIDED THORACIC BACK PAIN: Status: RESOLVED | Noted: 2024-03-21 | Resolved: 2024-05-15

## 2024-05-15 NOTE — PROGRESS NOTES
DISCHARGE  Reason for Discharge: Patient has failed to schedule further appointments.    Discharge Plan: Patient to continue home program.    Referring Provider:  Luisa Fonseca     03/21/24 0500   Appointment Info   Signing clinician's name / credentials Karol Ghotra, PT, DPT   Total/Authorized Visits 4   Visits Used 1   Medical Diagnosis Acute left-sided thoracic back pain   PT Tx Diagnosis Acute left-sided thoracic back pain, quadratus lumorum strain   Other pertinent information improving pain over the last few days. Wants to do evaluation and wait and see how things progress before scheudling further appointments.   Progress Note/Certification   Onset of illness/injury or Date of Surgery 03/18/24  (date of injury)   Therapy Frequency 1x a month for two months   Predicted Duration 2 months   Progress Note Due Date 05/21/24   Progress Note Completed Date 03/21/24   GOALS   PT Goals 2   PT Goal 1   Goal Identifier HEP   Goal Description Patient will be independent with HEP by discharge to improve status outside of therapy   Rationale to maximize safety and independence with performance of ADLs and functional tasks   Target Date 05/21/24   PT Goal 2   Goal Identifier pain   Goal Description patient will improve max pain level from 6/10 to 3/10 or less   Rationale to maximize safety and independence with performance of ADLs and functional tasks   Target Date 04/21/24   Subjective Report   Subjective Report see eval   Objective Measures   Objective Measures Objective Measure 1   Objective Measure 1   Objective Measure see eval   Treatment Interventions (PT)   Interventions Therapeutic Activity;Therapeutic Procedure/Exercise   Therapeutic Procedure/Exercise   Therapeutic Procedures: strength, endurance, ROM, flexibility minutes (24138) 9   Ther Proc 1 HEP   Ther Proc 1 - Details to improve pain and increase activation of QL and other back extensors   Skilled Intervention intervention selection   Patient  Response/Progress no pain, intense stretch over painful muscle.   Therapeutic Activity   Therapeutic Activities: dynamic activities to improve functional performance minutes (69802) 13   Therapeutic Activities Ther Act 2   Ther Act 1 Education   Ther Act 1 - Details on PT role, POC, exam findings, prognosis for muscle strain   Skilled Intervention instruction provided   Patient Response/Progress demo's understanding   Ther Act 2 lifting mechanics   Ther Act 2 - Details discusssed lifting mechanics to decrease rotation while lifting heavier objects to avoid over straining QL muscle.   Eval/Assessments   PT Eval, Low Complexity Minutes (58905) 22   Education   Learner/Method Patient;Listening;Demonstration;Pictures/Video;No Barriers to Learning   Plan   Home program see ptrx   Plan for next session review HEP, progress core/extensor, glute strengthening   Total Session Time   Timed Code Treatment Minutes 22   Total Treatment Time (sum of timed and untimed services) 44

## 2024-05-23 ENCOUNTER — TELEPHONE (OUTPATIENT)
Dept: FAMILY MEDICINE | Facility: CLINIC | Age: 64
End: 2024-05-23
Payer: COMMERCIAL

## 2024-05-23 NOTE — TELEPHONE ENCOUNTER
Kenney Mcgowan called to update us that he checked his blood pressure at home the other day and it was 116/79 he wanted to update PCP about this.    Kenney would like to try Viagra. He said he was messaging with an [facility] nurse and they mentioned doing an e visit. He would like to just set up a telephone appt to discuss this instead of doing an e visit. Routed to PCP and [facility] nursing team. Informed Garcia to call his clinic back if he does not hear back within a few hours today. Garcia was comfortable with and understanding of this plan. No further questions at this time.

## 2024-05-23 NOTE — TELEPHONE ENCOUNTER
Left message for patient to call [facility] back  When patient calls back please transfer to an RN  Yehuda LINARES RN

## 2024-05-29 ENCOUNTER — VIRTUAL VISIT (OUTPATIENT)
Dept: FAMILY MEDICINE | Facility: CLINIC | Age: 64
End: 2024-05-29
Payer: COMMERCIAL

## 2024-05-29 DIAGNOSIS — J45.40 MODERATE PERSISTENT ASTHMA WITHOUT COMPLICATION: ICD-10-CM

## 2024-05-29 DIAGNOSIS — E78.5 HYPERLIPIDEMIA LDL GOAL <130: ICD-10-CM

## 2024-05-29 DIAGNOSIS — N52.9 ERECTILE DYSFUNCTION, UNSPECIFIED ERECTILE DYSFUNCTION TYPE: Primary | ICD-10-CM

## 2024-05-29 DIAGNOSIS — I10 HYPERTENSION GOAL BP (BLOOD PRESSURE) < 140/90: ICD-10-CM

## 2024-05-29 PROCEDURE — 99213 OFFICE O/P EST LOW 20 MIN: CPT | Mod: 93 | Performed by: FAMILY MEDICINE

## 2024-05-29 RX ORDER — LISINOPRIL 20 MG/1
30 TABLET ORAL DAILY
Qty: 135 TABLET | Refills: 3 | Status: SHIPPED | OUTPATIENT
Start: 2024-05-29 | End: 2024-05-29

## 2024-05-29 RX ORDER — SILDENAFIL 50 MG/1
50 TABLET, FILM COATED ORAL DAILY PRN
Qty: 30 TABLET | Refills: 0 | Status: SHIPPED | OUTPATIENT
Start: 2024-05-29

## 2024-05-29 RX ORDER — LISINOPRIL 20 MG/1
30 TABLET ORAL DAILY
Qty: 90 TABLET | Refills: 3 | Status: SHIPPED | OUTPATIENT
Start: 2024-05-29 | End: 2024-05-29

## 2024-05-29 RX ORDER — LISINOPRIL 30 MG/1
30 TABLET ORAL DAILY
Qty: 90 TABLET | Refills: 3 | Status: SHIPPED | OUTPATIENT
Start: 2024-05-29

## 2024-05-29 NOTE — PATIENT INSTRUCTIONS
Erectile dysfunction is very common      Viagra is certainly effective and generally safe, but expensive and usually not covered by insurance. Side effects are common, including headaches, visual changes.     If you are at risk for heart disease it is important to know never to take nitrates (medications given during a heart attack).  Deaths have occurred in men taking Viagra concurrently with nitrates and he should avoid that combination at any time.     If this medication is not effective we should do further workup including serum testosterone, serum prolactin and diabetic screening.      Viagra is certainly effective and generally safe.Deaths have occurred in men taking Viagra concurrently with nitrates and you should avoid that combination at any time. You take the medication  minutes before intercourse.  I sent a prescription to your pharmacy to try

## 2024-05-29 NOTE — PROGRESS NOTES
Garcia is a 64 year old who is being evaluated via a billable telephone visit.    What phone number would you like to be contacted at? [phone]   How would you like to obtain your AVS? Jared  Originating Location (pt. Location): Home    Distant Location (provider location):  On-site    Assessment & Plan     1. Erectile dysfunction, unspecified erectile dysfunction type  - sildenafil (VIAGRA) 50 MG tablet; Take 1 tablet (50 mg) by mouth daily as needed (erectile dysfunction)  Dispense: 30 tablet; Refill: 0      The patient desires Viagra to treat his erectile dysfunction. History has not disclosed any obvious treatable cause of this complaint. . He can start with 50 mg dose, and increase to 100 mg if necessary. The method of use 1 hour prior to anticipated intercourse is explained. He should not use any more than one tablet in a 24 hour period. The side effects of possible headache, flushing, dyspepsia and transient changes in vision have been explained.     The patient is not taking nitrates, and denies he has access to nitrates in any form at any time. I have counseled him that taking Viagra with nitrates of any form can cause death. Additionally, Viagra serum concentrations can be increased by the following: cimetidine, erythromycin, itraconazole or ketoconazole. This patient does not take these drugs, but I have counseled him to avoid Viagra if he does take any of these.    We have also discussed the fact that there have been some deaths in patients after taking Viagra, felt due to the exertion of intercourse rather than the drug itself. The patient is aware of this, and accepts whatever unknown degree of risk there is in this aspect.    2. Hypertension goal BP (blood pressure) < 140/90  Plan:well controlled   - lisinopril (ZESTRIL) 30 MG tablet; Take 1 tablet (30 mg) by mouth daily  Dispense: 90 tablet; Refill: 3    3. Moderate persistent asthma without complication  Plan:ACT reviewed- well controlled on  adviar and does not need refills     4. Hyperlipidemia LDL goal <130  Plan: diet, excercise and continue atorvastatin 20 mg.  Does not need refill  Recent Labs   Lab Test 03/21/24  0803 10/26/21  1804   CHOL 214* 243*   HDL 78 90   * 140*   TRIG 76 67                The longitudinal plan of care for the diagnosis(es)/condition(s) as documented were addressed during this visit. Due to the added complexity in care, I will continue to support Garcia in the subsequent management and with ongoing continuity of care.Prescription drug management  I spent a total of 25 minutes on the day of the visit.   Time spent by me doing chart review, history and exam, documentation and further activities per the note    Subjective   Garcia is a 64 year old, presenting for the following health issues:  No chief complaint on file.    HPI   Poor libido, longer to reach erection and or sustain erection  Gradual problem, compounded by On going home stressors  Wife also going through menopause and hot flashes  He want to review viagra and wants to try it for better sexual health    His Blood pressure has been stable on 30 mg once daily of lisinopril    Asthma well controlled and does not have to use his rescue inhaler at all     Review of Systems  Constitutional, HEENT, cardiovascular, pulmonary, GI, , musculoskeletal, neuro, skin, endocrine and psych systems are negative, except as otherwise noted.      Objective    Vitals - Patient Reported  Systolic (Patient Reported): 116  Diastolic (Patient Reported): 79      Vitals:  No vitals were obtained today due to virtual visit.    Physical Exam   General: Alert and no distress //Respiratory: No audible wheeze, cough, or shortness of breath // Psychiatric:  Appropriate affect, tone, and pace of words            Phone call duration: 11 minutes  Signed Electronically by: Luisa Fonseca MD

## 2024-06-24 DIAGNOSIS — J45.41 MODERATE PERSISTENT ASTHMA WITH ACUTE EXACERBATION: ICD-10-CM

## 2024-06-24 RX ORDER — ALBUTEROL SULFATE 90 UG/1
1-2 AEROSOL, METERED RESPIRATORY (INHALATION) EVERY 4 HOURS PRN
Qty: 8.5 G | Refills: 2 | Status: SHIPPED | OUTPATIENT
Start: 2024-06-24

## 2024-08-23 DIAGNOSIS — I10 HYPERTENSION GOAL BP (BLOOD PRESSURE) < 140/90: ICD-10-CM

## 2024-08-23 RX ORDER — MONTELUKAST SODIUM 10 MG/1
10 TABLET ORAL AT BEDTIME
Qty: 90 TABLET | Refills: 3 | Status: SHIPPED | OUTPATIENT
Start: 2024-08-23

## 2024-10-08 DIAGNOSIS — J45.41 MODERATE PERSISTENT ASTHMA WITH ACUTE EXACERBATION: ICD-10-CM

## 2024-10-08 DIAGNOSIS — I10 HYPERTENSION GOAL BP (BLOOD PRESSURE) < 140/90: ICD-10-CM

## 2024-10-08 NOTE — TELEPHONE ENCOUNTER
Medication Question or Refill        What medication are you calling about (include dose and sig)?:  fluticasone-salmeterol (ADVAIR) 250-50 MCG/ACT inhaler     Preferred Pharmacy:     [facility]/pharmacy #[id number] - [facility] [address] [place] [id number]  Phone: [phone] Fax: [phone]      Controlled Substance Agreement on file:   CSA -- Patient Level:    CSA: None found at the patient level.       Who prescribed the medication?:  Luisa Haider    Do you need a refill? Yes    When did you use the medication last?  A week ago    Patient offered an appointment? No    Do you have any questions or concerns?  No      Could we send this information to you in Health system or would you prefer to receive a phone call?:   Patient would prefer a phone call   Okay to leave a detailed message?: Yes at Cell number on file:    Telephone Information:   Mobile [phone]

## 2024-10-09 RX ORDER — ALBUTEROL SULFATE 90 UG/1
1-2 INHALANT RESPIRATORY (INHALATION) EVERY 4 HOURS PRN
Qty: 8.5 G | Refills: 2 | Status: SHIPPED | OUTPATIENT
Start: 2024-10-09

## 2024-10-10 DIAGNOSIS — I10 HYPERTENSION GOAL BP (BLOOD PRESSURE) < 140/90: ICD-10-CM

## 2024-10-10 NOTE — TELEPHONE ENCOUNTER
Patient calling to check on refill of inhaler. Informed him that the albuterol was sent but he actually needs the ADVAIR prescription refilled.     Evette Torres RN

## 2024-10-11 RX ORDER — FLUTICASONE PROPIONATE AND SALMETEROL 250; 50 UG/1; UG/1
1 POWDER RESPIRATORY (INHALATION) EVERY 12 HOURS
Qty: 1 EACH | Refills: 11 | Status: SHIPPED | OUTPATIENT
Start: 2024-10-11

## 2024-10-22 ENCOUNTER — NURSE TRIAGE (OUTPATIENT)
Dept: FAMILY MEDICINE | Facility: CLINIC | Age: 64
End: 2024-10-22
Payer: COMMERCIAL

## 2024-10-22 NOTE — TELEPHONE ENCOUNTER
Patient calling to report he intermittently experiences difficulty swallowing  Particularly when eating red meat  States these occurrences cause difficulty breathing, anxiety, and increased saliva   Patient states this occurred a couple times in the past week  Reports incident just occurred but has now resolved  Denies all symptoms at this time such as difficulty breathing and swallowing  Patient drank carbonated drink and vomited and states this resolved the issue today  Advised to be seen today - offered same day appointment  Patient declined - states he is working  Patient requested to schedule tomorrow - scheduled     Patient verbalized understanding and will call back or [facility] 911 if new or worsening symptoms    Angela Logan RN    Reason for Disposition   Swallowing difficulty and cause unknown  (Exception: Difficulty swallowing is a chronic symptom.)    Additional Information   Negative: SEVERE difficulty swallowing (e.g., drooling or spitting) and started suddenly after taking a medicine or allergic foods   Negative: Wheezing, stridor, hoarseness, or difficulty breathing   Negative: Swollen tongue and sudden onset   Negative: Sounds like a life-threatening emergency to the triager   Negative: Sore throat (throat pain with swallowing)   Negative: SEVERE difficulty swallowing (e.g., drooling or spitting, can't swallow water)   Negative: Symptoms of food or bone stuck in throat or esophagus (e.g., pain in throat or chest, FB sensation, blood-tinged saliva)   Negative: SEVERE symptoms of pill stuck in throat or esophagus (e.g., severe pain, bleeding, or difficulty swallowing liquids)   Negative: Drinking very little and dehydration suspected (e.g., no urine > 12 hours, very dry mouth, very lightheaded)   Negative: Refuses to drink anything for > 12 hours   Negative: Patient sounds very sick or weak to the triager   Negative: Fever > 100.4 F (38.0 C)   Negative: Coughing spells occur during or within 2 hours  after eating/feedings   Negative: Weak immune system (e.g., HIV positive, cancer chemo, splenectomy, organ transplant, chronic steroids)   Negative: Symptoms of pill stuck in throat or esophagus (e.g., pain in throat or chest, FB sensation) and no relief after using Care Advice    Protocols used: Swallowing Difficulty-A-OH

## 2024-10-23 ENCOUNTER — OFFICE VISIT (OUTPATIENT)
Dept: FAMILY MEDICINE | Facility: CLINIC | Age: 64
End: 2024-10-23
Payer: COMMERCIAL

## 2024-10-23 VITALS
DIASTOLIC BLOOD PRESSURE: 85 MMHG | TEMPERATURE: 97.8 F | SYSTOLIC BLOOD PRESSURE: 158 MMHG | BODY MASS INDEX: 25.58 KG/M2 | HEIGHT: 70 IN | HEART RATE: 96 BPM | WEIGHT: 178.7 LBS | OXYGEN SATURATION: 97 % | RESPIRATION RATE: 16 BRPM

## 2024-10-23 DIAGNOSIS — I10 WHITE COAT SYNDROME WITH DIAGNOSIS OF HYPERTENSION: ICD-10-CM

## 2024-10-23 DIAGNOSIS — R13.19 ESOPHAGEAL DYSPHAGIA: Primary | ICD-10-CM

## 2024-10-23 DIAGNOSIS — I10 HYPERTENSION GOAL BP (BLOOD PRESSURE) < 140/90: ICD-10-CM

## 2024-10-23 PROCEDURE — 91320 SARSCV2 VAC 30MCG TRS-SUC IM: CPT | Performed by: FAMILY MEDICINE

## 2024-10-23 PROCEDURE — 99214 OFFICE O/P EST MOD 30 MIN: CPT | Performed by: FAMILY MEDICINE

## 2024-10-23 PROCEDURE — 90480 ADMN SARSCOV2 VAC 1/ONLY CMP: CPT | Performed by: FAMILY MEDICINE

## 2024-10-23 ASSESSMENT — ASTHMA QUESTIONNAIRES
QUESTION_5 LAST FOUR WEEKS HOW WOULD YOU RATE YOUR ASTHMA CONTROL: WELL CONTROLLED
QUESTION_4 LAST FOUR WEEKS HOW OFTEN HAVE YOU USED YOUR RESCUE INHALER OR NEBULIZER MEDICATION (SUCH AS ALBUTEROL): ONCE A WEEK OR LESS
QUESTION_2 LAST FOUR WEEKS HOW OFTEN HAVE YOU HAD SHORTNESS OF BREATH: ONCE OR TWICE A WEEK
QUESTION_3 LAST FOUR WEEKS HOW OFTEN DID YOUR ASTHMA SYMPTOMS (WHEEZING, COUGHING, SHORTNESS OF BREATH, CHEST TIGHTNESS OR PAIN) WAKE YOU UP AT NIGHT OR EARLIER THAN USUAL IN THE MORNING: NOT AT ALL
ACT_TOTALSCORE: 22
QUESTION_1 LAST FOUR WEEKS HOW MUCH OF THE TIME DID YOUR ASTHMA KEEP YOU FROM GETTING AS MUCH DONE AT WORK, SCHOOL OR AT HOME: NONE OF THE TIME
ACT_TOTALSCORE: 22

## 2024-10-23 ASSESSMENT — PAIN SCALES - GENERAL: PAINLEVEL_OUTOF10: NO PAIN (0)

## 2024-10-23 NOTE — PROGRESS NOTES
"  Assessment & Plan     Esophageal dysphagia  Plan: DDx:esophageal motility disorder, achalasia,  HO, Stricture or ,Zenker diverticulum or Schatzki Ring or cancer .  - XR Esophagram; Future  - Adult GI  Referral - Consult Only; Future    White coat syndrome with diagnoses Hypertension & goal BP (blood pressure) < 140/90  Plan: white coat syndrome  At home Blood pressure is WNL / patient. He will send me Outsell message, based on home Blood pressure report   No medications changes.        The longitudinal plan of care for the diagnosis(es)/condition(s) as documented were addressed during this visit. Due to the added complexity in care, I will continue to support Garcia in the subsequent management and with ongoing continuity of care.Ordering of each unique test  I spent a total of 34 minutes on the day of the visit.   Time spent by me doing chart review, history and exam, documentation and further activities per the note      The longitudinal plan of care for the diagnosis(es)/condition(s) as documented were addressed during this visit. Due to the added complexity in care, I will continue to support Garcia in the subsequent management and with ongoing continuity of care.Ordering of each unique test  I spent a total of 34 minutes on the day of the visit.   Time spent by me doing chart review, history and exam, documentation and further activities per the note        BMI  Estimated body mass index is 25.46 kg/m  as calculated from the following:    Height as of this encounter: 1.784 m (5' 10.25\").    Weight as of this encounter: 81.1 kg (178 lb 11.2 oz).   Weight management plan: Discussed healthy diet and exercise guidelines          Subjective   Garcia is a 64 year old, presenting for the following health issues:  Dysphagia    History of Present Illness       Reason for visit:  Choking episodes He is missing 1 dose(s) of medications per week.         Concern - Dysphagia   Onset: 1-2 weeks ago   Description: " "Intermittently experiencing difficulty swallowing when eating red meat   Intensity: Mild  Progression of Symptoms:  intermittent  Accompanying Signs & Symptoms: Diffuculty breathing, Anxiety and increased Saliva, Vomiting   Previous history of similar problem: Yes   Precipitating factors:        Worsened by: Eating red meat    Alleviating factors:        Improved by: Drinking Carbonated drink   Therapies tried and outcome: None      Food is sticking in the back of the throat on and off, always on sold food, never on fluids.  He has noted it more on eating red meat. Had a sever episode last year at  a wedding. Since then makes sure that he is adequately chewing each bit  daily and has noted  for past .  Good appetite, good energy.Denies gastroesophageal reflux disease. Over all in usual state of good health .          Review of Systems  Constitutional, neuro, ENT, endocrine, pulmonary, cardiac, gastrointestinal, genitourinary, musculoskeletal, integument and psychiatric systems are negative, except as otherwise noted.      Objective    BP (!) 158/85 (BP Location: Left arm, Patient Position: Sitting, Cuff Size: Adult Regular)   Pulse 96   Temp 97.8  F (36.6  C) (Skin)   Resp 16   Ht 1.784 m (5' 10.25\")   Wt 81.1 kg (178 lb 11.2 oz)   SpO2 97%   BMI 25.46 kg/m    Body mass index is 25.46 kg/m .  Physical Exam   GENERAL: alert and no distress  NECK: no adenopathy, no asymmetry, masses, or scars  RESP: lungs clear to auscultation - no rales, rhonchi or wheezes  CV: regular rate and rhythm, normal S1 S2, no S3 or S4, no murmur, click or rub, no peripheral edema  ABDOMEN: soft, nontender, no hepatosplenomegaly, no masses and bowel sounds normal            Signed Electronically by: Luisa Fonseca MD    "

## 2024-10-24 ENCOUNTER — TELEPHONE (OUTPATIENT)
Dept: GASTROENTEROLOGY | Facility: CLINIC | Age: 64
End: 2024-10-24

## 2024-10-24 NOTE — TELEPHONE ENCOUNTER
[facility] Call Center    Phone Message    May a detailed message be left on voicemail: Yes    Reason for Call: Other: Patient is currently scheduled on 1-7, as visit type New GI Urgent. This is outside the expected timeline for this referral. Patient has been added to the waitlist.      Action Taken: Message routed to:  Other: GI REFERRAL TRIAGE POOL     Travel Screening: Not Applicable

## 2024-10-29 ENCOUNTER — TELEPHONE (OUTPATIENT)
Dept: FAMILY MEDICINE | Facility: CLINIC | Age: 64
End: 2024-10-29
Payer: COMMERCIAL

## 2024-10-29 DIAGNOSIS — R63.4 WEIGHT LOSS: Primary | ICD-10-CM

## 2024-10-29 DIAGNOSIS — R13.19 ESOPHAGEAL DYSPHAGIA: ICD-10-CM

## 2024-10-29 NOTE — TELEPHONE ENCOUNTER
A.S.,  Patient calling  First available GI consult - 1/7/24  Patient scheduled    Patient unsure what other treatment may be available in the mean time such as Pepcid OTC    Thanks,  Angela GUTIERRES RN        Please give them phone number, I have send the request for endoscopy  We need diagnostic test before treatment can be offered    1. Weight loss (Primary)  - UPPER GI ENDOSCOPY; Future    2. Esophageal dysphagia  - UPPER GI ENDOSCOPY; Future

## 2024-10-30 NOTE — TELEPHONE ENCOUNTER
Patient updated and verbalized understanding - will call to schedule    Endoscopy [phone]     Thanks,  Angela GUTIERRES RN

## 2024-11-22 ENCOUNTER — ANCILLARY PROCEDURE (OUTPATIENT)
Dept: GENERAL RADIOLOGY | Facility: CLINIC | Age: 64
End: 2024-11-22
Attending: FAMILY MEDICINE
Payer: COMMERCIAL

## 2024-11-22 ENCOUNTER — TELEPHONE (OUTPATIENT)
Dept: FAMILY MEDICINE | Facility: CLINIC | Age: 64
End: 2024-11-22

## 2024-11-22 DIAGNOSIS — R13.19 ESOPHAGEAL DYSPHAGIA: ICD-10-CM

## 2024-11-22 PROCEDURE — 74221 X-RAY XM ESOPHAGUS 2CNTRST: CPT | Mod: GC | Performed by: RADIOLOGY

## 2024-11-22 NOTE — TELEPHONE ENCOUNTER
Pt calling wanting it documented that he started to take Prilosec OTC  and Pepcid 20mg after procedure today. Thanks    Alejandra Dyer RN  [facility]

## 2024-12-09 NOTE — TELEPHONE ENCOUNTER
Patient is now scheduled within the appropriate time frame of one month for urgent triaged referrals. No rescheduling is needed anymore.    Scheduled with Gastroenterology (Rudy Wynn PA-C)  01/07/2025 at 7:30 AM     Closing encounter.      Zuleyma Akhtar, [facility]

## 2025-01-20 DIAGNOSIS — N52.9 ERECTILE DYSFUNCTION, UNSPECIFIED ERECTILE DYSFUNCTION TYPE: ICD-10-CM

## 2025-01-20 RX ORDER — SILDENAFIL 50 MG/1
TABLET, FILM COATED ORAL
Qty: 30 TABLET | Refills: 0 | Status: SHIPPED | OUTPATIENT
Start: 2025-01-20

## 2025-05-04 ENCOUNTER — HEALTH MAINTENANCE LETTER (OUTPATIENT)
Age: 65
End: 2025-05-04

## 2025-07-22 DIAGNOSIS — N52.9 ERECTILE DYSFUNCTION, UNSPECIFIED ERECTILE DYSFUNCTION TYPE: ICD-10-CM

## 2025-07-22 RX ORDER — SILDENAFIL 50 MG/1
50 TABLET, FILM COATED ORAL DAILY PRN
Qty: 30 TABLET | Refills: 0 | Status: SHIPPED | OUTPATIENT
Start: 2025-07-22

## 2025-08-03 DIAGNOSIS — I10 HYPERTENSION GOAL BP (BLOOD PRESSURE) < 140/90: ICD-10-CM

## 2025-08-04 RX ORDER — LISINOPRIL 30 MG/1
30 TABLET ORAL DAILY
Qty: 30 TABLET | Refills: 0 | Status: SHIPPED | OUTPATIENT
Start: 2025-08-04

## 2025-08-17 ENCOUNTER — NURSE TRIAGE (OUTPATIENT)
Dept: FAMILY MEDICINE | Facility: CLINIC | Age: 65
End: 2025-08-17
Payer: COMMERCIAL

## 2025-08-18 ENCOUNTER — VIRTUAL VISIT (OUTPATIENT)
Dept: FAMILY MEDICINE | Facility: CLINIC | Age: 65
End: 2025-08-18
Payer: COMMERCIAL

## 2025-08-18 DIAGNOSIS — S76.302A LEFT HAMSTRING INJURY, INITIAL ENCOUNTER: Primary | ICD-10-CM

## 2025-08-18 PROBLEM — M54.16 LEFT LUMBAR RADICULOPATHY: Status: RESOLVED | Noted: 2023-08-31 | Resolved: 2025-08-18

## 2025-08-18 PROBLEM — U07.1 INFECTION DUE TO 2019 NOVEL CORONAVIRUS: Status: RESOLVED | Noted: 2023-04-06 | Resolved: 2025-08-18

## 2025-08-18 PROBLEM — R39.9 LOWER URINARY TRACT SYMPTOMS (LUTS): Status: RESOLVED | Noted: 2021-04-13 | Resolved: 2025-08-18

## 2025-08-18 PROCEDURE — 98005 SYNCH AUDIO-VIDEO EST LOW 20: CPT | Performed by: PHYSICIAN ASSISTANT

## 2025-08-18 ASSESSMENT — ASTHMA QUESTIONNAIRES
QUESTION_5 LAST FOUR WEEKS HOW WOULD YOU RATE YOUR ASTHMA CONTROL: WELL CONTROLLED
QUESTION_3 LAST FOUR WEEKS HOW OFTEN DID YOUR ASTHMA SYMPTOMS (WHEEZING, COUGHING, SHORTNESS OF BREATH, CHEST TIGHTNESS OR PAIN) WAKE YOU UP AT NIGHT OR EARLIER THAN USUAL IN THE MORNING: ONCE OR TWICE
QUESTION_1 LAST FOUR WEEKS HOW MUCH OF THE TIME DID YOUR ASTHMA KEEP YOU FROM GETTING AS MUCH DONE AT WORK, SCHOOL OR AT HOME: A LITTLE OF THE TIME
QUESTION_2 LAST FOUR WEEKS HOW OFTEN HAVE YOU HAD SHORTNESS OF BREATH: ONCE OR TWICE A WEEK
ACT_TOTALSCORE: 20
QUESTION_4 LAST FOUR WEEKS HOW OFTEN HAVE YOU USED YOUR RESCUE INHALER OR NEBULIZER MEDICATION (SUCH AS ALBUTEROL): ONCE A WEEK OR LESS

## 2025-08-20 ENCOUNTER — MYC MEDICAL ADVICE (OUTPATIENT)
Dept: FAMILY MEDICINE | Facility: CLINIC | Age: 65
End: 2025-08-20

## 2025-08-20 DIAGNOSIS — S76.302A LEFT HAMSTRING INJURY, INITIAL ENCOUNTER: Primary | ICD-10-CM

## 2025-08-21 ENCOUNTER — PATIENT OUTREACH (OUTPATIENT)
Dept: CARE COORDINATION | Facility: CLINIC | Age: 65
End: 2025-08-21
Payer: COMMERCIAL

## 2025-08-21 ENCOUNTER — OFFICE VISIT (OUTPATIENT)
Dept: ORTHOPEDICS | Facility: CLINIC | Age: 65
End: 2025-08-21
Attending: PHYSICIAN ASSISTANT
Payer: COMMERCIAL

## 2025-08-21 ENCOUNTER — ANCILLARY PROCEDURE (OUTPATIENT)
Dept: GENERAL RADIOLOGY | Facility: CLINIC | Age: 65
End: 2025-08-21
Attending: FAMILY MEDICINE
Payer: COMMERCIAL

## 2025-08-21 DIAGNOSIS — S76.302A LEFT HAMSTRING INJURY, INITIAL ENCOUNTER: ICD-10-CM

## 2025-08-21 DIAGNOSIS — N52.9 ERECTILE DYSFUNCTION, UNSPECIFIED ERECTILE DYSFUNCTION TYPE: ICD-10-CM

## 2025-08-21 RX ORDER — SILDENAFIL 50 MG/1
TABLET, FILM COATED ORAL
Qty: 30 TABLET | Refills: 0 | Status: SHIPPED | OUTPATIENT
Start: 2025-08-21

## 2025-08-22 ENCOUNTER — HOSPITAL ENCOUNTER (OUTPATIENT)
Dept: MRI IMAGING | Facility: HOSPITAL | Age: 65
Discharge: HOME | End: 2025-08-22
Attending: FAMILY MEDICINE | Admitting: FAMILY MEDICINE
Payer: COMMERCIAL

## 2025-08-22 DIAGNOSIS — S76.302A LEFT HAMSTRING INJURY, INITIAL ENCOUNTER: ICD-10-CM

## 2025-08-22 PROCEDURE — 73718 MRI LOWER EXTREMITY W/O DYE: CPT | Mod: LT

## 2025-08-25 ENCOUNTER — OFFICE VISIT (OUTPATIENT)
Dept: ORTHOPEDICS | Facility: CLINIC | Age: 65
End: 2025-08-25
Payer: COMMERCIAL

## 2025-08-25 ENCOUNTER — TELEPHONE (OUTPATIENT)
Dept: ORTHOPEDICS | Facility: CLINIC | Age: 65
End: 2025-08-25

## 2025-08-25 DIAGNOSIS — S76.302A LEFT HAMSTRING INJURY, INITIAL ENCOUNTER: Primary | ICD-10-CM

## 2025-08-25 DIAGNOSIS — S76.312A RUPTURE OF LEFT PROXIMAL HAMSTRING TENDON, INITIAL ENCOUNTER: ICD-10-CM

## 2025-08-25 PROCEDURE — 99204 OFFICE O/P NEW MOD 45 MIN: CPT | Performed by: STUDENT IN AN ORGANIZED HEALTH CARE EDUCATION/TRAINING PROGRAM

## 2025-08-25 ASSESSMENT — KOOS JR: KOOS JR SCORING: 100

## 2025-09-02 ASSESSMENT — ACTIVITIES OF DAILY LIVING (ADL)
LIMPING: THE SYMPTOM AFFECTS MY ACTIVITY MODERATELY
SIT WITH YOUR KNEE BENT: ACTIVITY IS FAIRLY DIFFICULT
HOW_WOULD_YOU_RATE_THE_OVERALL_FUNCTION_OF_YOUR_KNEE_DURING_YOUR_USUAL_DAILY_ACTIVITIES?: ABNORMAL
LIMPING: THE SYMPTOM AFFECTS MY ACTIVITY MODERATELY
AT_ITS_WORST?: 5
REACHING_FOR_SOMETHING_ON_A_HIGH_SHELF: 6
SWELLING: THE SYMPTOM AFFECTS MY ACTIVITY SLIGHTLY
WEAKNESS: THE SYMPTOM AFFECTS MY ACTIVITY SLIGHTLY
WEAKNESS: THE SYMPTOM AFFECTS MY ACTIVITY SLIGHTLY
AS_A_RESULT_OF_YOUR_KNEE_INJURY,_HOW_WOULD_YOU_RATE_YOUR_CURRENT_LEVEL_OF_DAILY_ACTIVITY?: ABNORMAL
WALK: ACTIVITY IS SOMEWHAT DIFFICULT
SIT WITH YOUR KNEE BENT: ACTIVITY IS FAIRLY DIFFICULT
GO UP STAIRS: ACTIVITY IS MINIMALLY DIFFICULT
PLEASE_INDICATE_YOR_PRIMARY_REASON_FOR_REFERRAL_TO_THERAPY:: SHOULDER
AS_A_RESULT_OF_YOUR_KNEE_INJURY,_HOW_WOULD_YOU_RATE_YOUR_CURRENT_LEVEL_OF_DAILY_ACTIVITY?: ABNORMAL
PUTTING_ON_YOUR_PANTS: 4
HOW_WOULD_YOU_RATE_THE_OVERALL_FUNCTION_OF_YOUR_KNEE_DURING_YOUR_USUAL_DAILY_ACTIVITIES?: ABNORMAL
KNEE_ACTIVITY_OF_DAILY_LIVING_SCORE: 57.14
KNEEL ON THE FRONT OF YOUR KNEE: ACTIVITY IS SOMEWHAT DIFFICULT
STAND: ACTIVITY IS MINIMALLY DIFFICULT
SQUAT: ACTIVITY IS VERY DIFFICULT
RISE FROM A CHAIR: ACTIVITY IS FAIRLY DIFFICULT
SWELLING: THE SYMPTOM AFFECTS MY ACTIVITY SLIGHTLY
REMOVING_SOMETHING_FROM_YOUR_BACK_POCKET: 0
PLEASE_INDICATE_YOR_PRIMARY_REASON_FOR_REFERRAL_TO_THERAPY:: KNEE
RISE FROM A CHAIR: ACTIVITY IS FAIRLY DIFFICULT
PUTTING_ON_AN_UNDERSHIRT_OR_A_PULLOVER_SWEATER: 0
WALK: ACTIVITY IS SOMEWHAT DIFFICULT
PLACING_AN_OBJECT_ON_A_HIGH_SHELF: 2
GIVING WAY, BUCKLING OR SHIFTING OF KNEE: THE SYMPTOM AFFECTS MY ACTIVITY SLIGHTLY
GO DOWN STAIRS: ACTIVITY IS MINIMALLY DIFFICULT
WASHING_YOUR_BACK: 0
SQUAT: ACTIVITY IS VERY DIFFICULT
WASHING_YOUR_HAIR?: 0
GO DOWN STAIRS: ACTIVITY IS MINIMALLY DIFFICULT
WHEN_LYING_ON_THE_INVOLVED_SIDE: 4
KNEE_ACTIVITY_OF_DAILY_LIVING_SUM: 40
PAIN: THE SYMPTOM AFFECTS MY ACTIVITY SLIGHTLY
PUSHING_WITH_THE_INVOLVED_ARM: 4
KNEEL ON THE FRONT OF YOUR KNEE: ACTIVITY IS SOMEWHAT DIFFICULT
PAIN: THE SYMPTOM AFFECTS MY ACTIVITY SLIGHTLY
RAW_SCORE: 40
STIFFNESS: THE SYMPTOM AFFECTS MY ACTIVITY SLIGHTLY
GO UP STAIRS: ACTIVITY IS MINIMALLY DIFFICULT
STAND: ACTIVITY IS MINIMALLY DIFFICULT
STIFFNESS: THE SYMPTOM AFFECTS MY ACTIVITY SLIGHTLY
CARRYING_A_HEAVY_OBJECT_OF_10_POUNDS: 3
GIVING WAY, BUCKLING OR SHIFTING OF KNEE: THE SYMPTOM AFFECTS MY ACTIVITY SLIGHTLY

## 2025-09-04 ENCOUNTER — THERAPY VISIT (OUTPATIENT)
Dept: PHYSICAL THERAPY | Facility: CLINIC | Age: 65
End: 2025-09-04
Attending: STUDENT IN AN ORGANIZED HEALTH CARE EDUCATION/TRAINING PROGRAM
Payer: COMMERCIAL

## 2025-09-04 DIAGNOSIS — S76.312A RUPTURE OF LEFT PROXIMAL HAMSTRING TENDON, INITIAL ENCOUNTER: ICD-10-CM

## 2025-09-04 DIAGNOSIS — S76.302A LEFT HAMSTRING INJURY, INITIAL ENCOUNTER: ICD-10-CM
